# Patient Record
Sex: MALE | Race: WHITE | NOT HISPANIC OR LATINO | Employment: FULL TIME | URBAN - METROPOLITAN AREA
[De-identification: names, ages, dates, MRNs, and addresses within clinical notes are randomized per-mention and may not be internally consistent; named-entity substitution may affect disease eponyms.]

---

## 2017-02-23 DIAGNOSIS — N40.1 BENIGN NON-NODULAR PROSTATIC HYPERPLASIA WITH LOWER URINARY TRACT SYMPTOMS: ICD-10-CM

## 2017-02-23 RX ORDER — DOXAZOSIN 2 MG/1
2 TABLET ORAL AT BEDTIME
Qty: 90 TABLET | Refills: 0 | Status: SHIPPED | OUTPATIENT
Start: 2017-02-23 | End: 2017-11-24

## 2017-02-23 NOTE — TELEPHONE ENCOUNTER
Doxazosin Mesylate 2 mg tab      Last Written Prescription Date: 12-  Last Fill Quantity: 01-, #30 refills: 1    Last Office Visit with [facility], [facility] or [facility] prescribing provider:  11- Cassandra Villafana.   Future Office Visit:        BP Readings from Last 3 Encounters:   12/29/16 (!) 128/92   11/25/16 131/84   10/25/16 116/81

## 2017-02-23 NOTE — TELEPHONE ENCOUNTER
Routing refill request to provider for review/approval because:  Labs out of range:  BP  Joel Donnelly RN, BSN

## 2017-03-05 ENCOUNTER — OFFICE VISIT (OUTPATIENT)
Dept: URGENT CARE | Facility: URGENT CARE | Age: 52
End: 2017-03-05
Payer: COMMERCIAL

## 2017-03-05 VITALS
BODY MASS INDEX: 29.35 KG/M2 | DIASTOLIC BLOOD PRESSURE: 60 MMHG | TEMPERATURE: 97.8 F | HEART RATE: 71 BPM | SYSTOLIC BLOOD PRESSURE: 122 MMHG | WEIGHT: 205 LBS | OXYGEN SATURATION: 98 % | HEIGHT: 70 IN

## 2017-03-05 DIAGNOSIS — J01.90 ACUTE SINUSITIS WITH SYMPTOMS > 10 DAYS: Primary | ICD-10-CM

## 2017-03-05 PROCEDURE — 99213 OFFICE O/P EST LOW 20 MIN: CPT | Performed by: PHYSICIAN ASSISTANT

## 2017-03-05 RX ORDER — DOXYCYCLINE 100 MG/1
100 CAPSULE ORAL 2 TIMES DAILY
Qty: 28 CAPSULE | Refills: 0 | Status: SHIPPED | OUTPATIENT
Start: 2017-03-05 | End: 2017-03-19

## 2017-03-05 RX ORDER — FLUTICASONE PROPIONATE 50 MCG
2 SPRAY, SUSPENSION (ML) NASAL DAILY
Qty: 1 BOTTLE | Refills: 0 | Status: SHIPPED | OUTPATIENT
Start: 2017-03-05 | End: 2017-11-24

## 2017-03-05 NOTE — MR AVS SNAPSHOT
After Visit Summary   3/5/2017    Willard Manzano    MRN: [id number]           Patient Information     Date Of Birth          1965        Visit Information        Provider Department      3/5/2017 10:45 AM New Talbert PA-C [facility]        Today's Diagnoses     Acute sinusitis with symptoms > 10 days    -  1      Care Instructions      Sinusitis (Antibiotic Treatment)    The sinuses are air-filled spaces within the bones of the face. They connect to the inside of the nose. Sinusitis is an inflammation of the tissue lining the sinus cavity. Sinus inflammation can occur during a cold. It can also be due to allergies to pollens and other particles in the air. Sinusitis can cause symptoms of sinus congestion and fullness. A sinus infection causes fever, headache and facial pain. There is often green or yellow drainage from the nose or into the back of the throat (post-nasal drip). You have been given antibiotics to treat this condition.  Home care:    Take the full course of antibiotics as instructed. Do not stop taking them, even if you feel better.    Drink plenty of water, hot tea, and other liquids. This may help thin mucus. It also may promote sinus drainage.    Heat may help soothe painful areas of the face. Use a towel soaked in hot water. Or,  the shower and direct the hot spray onto your face. Using a vaporizer along with a menthol rub at night may also help.     An expectorant containing guaifenesin may help thin the mucus and promote drainage from the sinuses.    Over-the-counter decongestants may be used unless a similar medicine was prescribed. Nasal sprays work the fastest. Use one that contains phenylephrine or oxymetazoline. First blow the nose gently. Then use the spray. Do not use these medicines more often than directed on the label or symptoms may get worse. You may also use tablets containing pseudoephedrine. Avoid products that combine  ingredients, because side effects may be increased. Read labels. You can also ask the pharmacist for help. (NOTE: Persons with high blood pressure should not use decongestants. They can raise blood pressure.)    Over-the-counter antihistamines may help if allergies contributed to your sinusitis.      Do not use nasal rinses or irrigation during an acute sinus infection, unless told to by your health care provider. Rinsing may spread the infection to other sinuses.    Use acetaminophen or ibuprofen to control pain, unless another pain medicine was prescribed. (If you have chronic liver or kidney disease or ever had a stomach ulcer, talk with your doctor before using these medicines. Aspirin should never be used in anyone under 18 years of age who is ill with a fever. It may cause severe liver damage.)    Don't smoke. This can worsen symptoms.  Follow-up care  Follow up with your healthcare provider or our staff if you are not improving within the next week.  When to seek medical advice  Call your healthcare provider if any of these occur:    Facial pain or headache becoming more severe    Stiff neck    Unusual drowsiness or confusion    Swelling of the forehead or eyelids    Vision problems, including blurred or double vision    Fever of 100.4 F (38 C) or higher, or as directed by your healthcare provider    Seizure    Breathing problems  Symptoms not resolving within 10 days      9923-4091 [employer]. [address] [place] [address], [place] [address]. All rights reserved. This information is not intended as a substitute for professional medical care. Always follow your healthcare professional's instructions.              Follow-ups after your visit        Who to contact     If you have questions or need follow up information about today's clinic visit or your schedule please contact [facility] directly at [phone].  Normal or non-critical lab and imaging results will be communicated to you  "by [employer], letter or phone within 4 business days after the clinic has received the results. If you do not hear from us within 7 days, please contact the clinic through [employer] or phone. If you have a critical or abnormal lab result, we will notify you by phone as soon as possible.  Submit refill requests through [employer] or call your pharmacy and they will forward the refill request to us. Please allow 3 business days for your refill to be completed.          Additional Information About Your Visit        Fontself Information     [employer] gives you secure access to your electronic health record. If you see a primary care provider, you can also send messages to your care team and make appointments. If you have questions, please call your primary care clinic.  If you do not have a primary care provider, please call [phone] and they will assist you.        Care EveryWhere ID     This is your Care EveryWhere ID. This could be used by other organizations to access your [facility] medical records  [id number]        Your Vitals Were     Pulse Temperature Height Pulse Oximetry BMI (Body Mass Index)       71 97.8  F (36.6  C) (Oral) 5' 10\" (1.778 m) 98% 29.41 kg/m2        Blood Pressure from Last 3 Encounters:   03/05/17 122/60   12/29/16 (!) 128/92   11/25/16 131/84    Weight from Last 3 Encounters:   03/05/17 205 lb (93 kg)   11/25/16 203 lb 3.2 oz (92.2 kg)   10/25/16 205 lb 12.8 oz (93.4 kg)              Today, you had the following     No orders found for display         Today's Medication Changes          These changes are accurate as of: 3/5/17 12:39 PM.  If you have any questions, ask your nurse or doctor.               Start taking these medicines.        Dose/Directions    doxycycline Monohydrate 100 MG Caps   Used for:  Acute sinusitis with symptoms > 10 days   Started by:  New Talbert PA-C        Dose:  100 mg   Take 1 capsule (100 mg) by mouth 2 times daily for 14 days   Quantity:  28 " capsule   Refills:  0       fluticasone 50 MCG/ACT spray   Commonly known as:  FLONASE   Used for:  Acute sinusitis with symptoms > 10 days   Started by:  New Talbert PA-C        Dose:  2 spray   Spray 2 sprays into both nostrils daily   Quantity:  1 Bottle   Refills:  0         Stop taking these medicines if you haven't already. Please contact your care team if you have questions.     amoxicillin 875 MG tablet   Commonly known as:  AMOXIL   Stopped by:  New Talbert PA-C           dexamethasone 4 MG/ML injection   Commonly known as:  DECADRON   Stopped by:  New Talbert PA-C                Where to get your medicines      These medications were sent to [facility]/pharmacy #[id number] - [place], [place] - [address] [address].  [address] [address] [address]     Phone:  [phone]     doxycycline Monohydrate 100 MG Caps    fluticasone 50 MCG/ACT spray                Primary Care Provider Office Phone # Fax #    Cassandra Hiwot Villafana PA-C [phone]       [address] [address]        Thank you!     Thank you for choosing [facility]  for your care. Our goal is always to provide you with excellent care. Hearing back from our patients is one way we can continue to improve our services. Please take a few minutes to complete the written survey that you may receive in the mail after your visit with us. Thank you!             Your Updated Medication List - Protect others around you: Learn how to safely use, store and throw away your medicines at www.disposemymeds.org.          This list is accurate as of: 3/5/17 12:39 PM.  Always use your most recent med list.                   Brand Name Dispense Instructions for use    ALLEGRA 180 MG tablet   Generic drug:  fexofenadine     30 tablet    Take 1 tablet by mouth daily.       aspirin 81 MG chewable tablet      Take 81 mg by mouth daily       doxazosin 2  MG tablet    CARDURA    90 tablet    Take 1 tablet (2 mg) by mouth At Bedtime       doxycycline Monohydrate 100 MG Caps     28 capsule    Take 1 capsule (100 mg) by mouth 2 times daily for 14 days       fenofibrate 145 MG tablet    TRICOR    90 tablet    Take 1 tablet (145 mg) by mouth daily       FISH OIL PO          fluticasone 50 MCG/ACT spray    FLONASE    1 Bottle    Spray 2 sprays into both nostrils daily       MELATONIN PO          metFORMIN 500 MG 24 hr tablet    GLUCOPHAGE-XR    90 tablet    Take 1 tablet (500 mg) by mouth daily (with dinner)       omeprazole 40 MG capsule    priLOSEC    90 capsule    Take 1 capsule (40 mg) by mouth daily Take 30-60 minutes before a meal.

## 2017-03-05 NOTE — PROGRESS NOTES
"SUBJECTIVE:    Willard Manzano presents to  today for evaluation of nasal congestion, purulent rhinorrhea and facial/sinus pain x ~ 2 weeks duration.  Prone to sinusitis and has background hx of allergic rhinitis.  Plans to see ENT soon.     Patient denies any significant cough, F/C/N/V/D, rash, abdominal pain or any other acute illness sxs.        Past Medical History   Diagnosis Date     Chiari malformation type I (H)      Elevated fasting glucose 1/19/2012     Headaches, migraine      Headaches, migraine      Other and unspecified hyperlipidemia      Rotator cuff tear 1/22/2012     Rotator cuff tear 1/22/2012     Rotator cuff tendinitis 1/2011     Inj. Dr. Cheney.      Spinal headache 2007     spontaneous spinal fluid leak - 2 blood patches     Urethral polyp 6-24-10     Dr. Card     Urethral polyp 6/24/2010     Dr. Card        Current Outpatient Prescriptions:      doxazosin (CARDURA) 2 MG tablet, Take 1 tablet (2 mg) by mouth At Bedtime, Disp: 90 tablet, Rfl: 0     MELATONIN PO, , Disp: , Rfl:      fenofibrate (TRICOR) 145 MG tablet, Take 1 tablet (145 mg) by mouth daily, Disp: 90 tablet, Rfl: 3     omeprazole (PRILOSEC) 40 MG capsule, Take 1 capsule (40 mg) by mouth daily Take 30-60 minutes before a meal., Disp: 90 capsule, Rfl: 3     metFORMIN (GLUCOPHAGE-XR) 500 MG 24 hr tablet, Take 1 tablet (500 mg) by mouth daily (with dinner), Disp: 90 tablet, Rfl: 1     aspirin 81 MG chewable tablet, Take 81 mg by mouth daily, Disp: , Rfl:      Omega-3 Fatty Acids (FISH OIL PO), , Disp: , Rfl:      fexofenadine (ALLEGRA) 180 MG tablet, Take 1 tablet by mouth daily., Disp: 30 tablet, Rfl: 1    Allergies   Allergen Reactions     Cats      Congestion and drainage     No Known Allergies            OBJECTIVE:  /60 (BP Location: Right arm, Patient Position: Chair, Cuff Size: Adult Regular)  Pulse 71  Temp 97.8  F (36.6  C) (Oral)  Ht 5' 10\" (1.778 m)  Wt 205 lb (93 kg)  SpO2 98%  BMI 29.41 kg/m2    General " "appearance: alert and no apparent distress  Skin color is pink and without rash.  HEENT:   Conjunctiva not injected.  Sclera clear.  Left TM is normal: no effusions, no erythema, and normal landmarks.  Right TM is normal: no effusions, no erythema, and normal landmarks.  Nasal mucosa is congested and erythematous. Positive maxillary sinus tenderness.   Oropharyngeal exam is normal other than post-nasal drip : no lesions, erythema, adenopathy or exudate.  Neck is supple with no adenopathy  CARDIAC:NORMAL - regular rate and rhythm without murmur.  RESP: Normal - CTA without rales, rhonchi, or wheezing.      ASSESSMENT/PLAN:    (J01.90) Acute sinusitis with symptoms > 10 days  (primary encounter diagnosis)  Plan: doxycycline Monohydrate 100 MG CAPS,         fluticasone (FLONASE) 50 MCG/ACT spray  abx and Flonase as per above. Follow-up with PCP if sxs change, worsen or fail to fully resolve with above tx.  In addition to the above, \"red flag\" signs and sxs are reviewed with pt both verbally and by way of printed educational material for home review.  Pt verbalizes understanding of and agrees to the above plan.         "

## 2017-03-25 DIAGNOSIS — R73.01 ELEVATED FASTING GLUCOSE: ICD-10-CM

## 2017-03-27 NOTE — TELEPHONE ENCOUNTER
Pending Prescriptions:                       Disp   Refills    metFORMIN (GLUCOPHAGE-XR) 500 MG 24 hr ta*90 tab*1            Sig: TAKE 1 TABLET (500 MG) BY MOUTH DAILY (WITH           DINNER)               Last Written Prescription Date: 9/29/2016  Last Fill Quantity: 90, # refills: 1  Last Office Visit with [facility], [facility] or [facility] prescribing provider:  11/25/2016Broderick        BP Readings from Last 3 Encounters:   03/05/17 122/60   12/29/16 (!) 128/92   11/25/16 131/84     No results found for: MICROL  No results found for: MICROALBUMIN  Creatinine   Date Value Ref Range Status   08/20/2014 1.13 0.66 - 1.25 mg/dL Final   ]  GFR Estimate   Date Value Ref Range Status   08/20/2014 68 >60 mL/min/1.7m2 Final     Comment:     Non  GFR Calc   08/09/2013 65 >60 mL/min/1.7m2 Final   01/10/2013 62 >60 mL/min/1.7m2 Final     GFR Estimate If Black   Date Value Ref Range Status   08/20/2014 83 >60 mL/min/1.7m2 Final     Comment:      GFR Calc   08/09/2013 79 >60 mL/min/1.7m2 Final   01/10/2013 75 >60 mL/min/1.7m2 Final     Lab Results   Component Value Date    CHOL 191 03/25/2016     Lab Results   Component Value Date    HDL 41 03/25/2016     Lab Results   Component Value Date     03/25/2016     Lab Results   Component Value Date    TRIG 100 03/25/2016     Lab Results   Component Value Date    CHOLHDLRATIO 4.7 08/20/2014     Lab Results   Component Value Date    AST 27 08/20/2014     Lab Results   Component Value Date    ALT 42 10/25/2016     Lab Results   Component Value Date    A1C 6.0 10/25/2016    A1C 6.0 03/25/2016    A1C 6.4 12/30/2015    A1C 6.1 08/20/2014    A1C 5.9 08/01/2013     Potassium   Date Value Ref Range Status   08/20/2014 4.1 3.4 - 5.3 mmol/L Final

## 2017-03-28 RX ORDER — METFORMIN HCL 500 MG
TABLET, EXTENDED RELEASE 24 HR ORAL
Qty: 90 TABLET | Refills: 1 | Status: SHIPPED | OUTPATIENT
Start: 2017-03-28 | End: 2017-09-29

## 2017-04-04 ENCOUNTER — OFFICE VISIT (OUTPATIENT)
Dept: FAMILY MEDICINE | Facility: CLINIC | Age: 52
End: 2017-04-04
Payer: COMMERCIAL

## 2017-04-04 VITALS
DIASTOLIC BLOOD PRESSURE: 88 MMHG | TEMPERATURE: 98 F | WEIGHT: 206 LBS | BODY MASS INDEX: 29.49 KG/M2 | HEART RATE: 85 BPM | RESPIRATION RATE: 18 BRPM | HEIGHT: 70 IN | OXYGEN SATURATION: 95 % | SYSTOLIC BLOOD PRESSURE: 128 MMHG

## 2017-04-04 DIAGNOSIS — J01.01 ACUTE RECURRENT MAXILLARY SINUSITIS: Primary | ICD-10-CM

## 2017-04-04 DIAGNOSIS — J30.2 SEASONAL ALLERGIC RHINITIS, UNSPECIFIED ALLERGIC RHINITIS TRIGGER: ICD-10-CM

## 2017-04-04 PROCEDURE — 99213 OFFICE O/P EST LOW 20 MIN: CPT | Performed by: PHYSICIAN ASSISTANT

## 2017-04-04 RX ORDER — MONTELUKAST SODIUM 10 MG/1
10 TABLET ORAL AT BEDTIME
Qty: 90 TABLET | Refills: 1 | Status: SHIPPED | OUTPATIENT
Start: 2017-04-04 | End: 2017-11-24

## 2017-04-04 NOTE — PROGRESS NOTES
SUBJECTIVE:                                                    Willard Manzano is a 52 year old male who presents to clinic today for the following health issues:      Acute Illness   Acute illness concern: sinus   Onset: 6 days     Fever: no     Chills/Sweats: YES    Headache (location?): YES    Sinus Pressure:YES    Conjunctivitis:  YES: bilateral    Ear Pain: no    Rhinorrhea: YES    Congestion: YES    Sore Throat: no      Cough: YES-non-productive    Wheeze: YES    Decreased Appetite: YES    Nausea: no     Vomiting: no     Diarrhea:  no     Dysuria/Freq.: no     Fatigue/Achiness: YES    Sick/Strep Exposure: no      Therapies Tried and outcome: Nyquil     Was seen earlier this month for similar symptoms, which responded with doxy. Also was seen in December for similar symptoms yet again and responded to amoxicillin.    Of note, patient does have history of allergies. Takes allegra daily. Was given flonase earlier this month. Not currently taking this.       Problem list and histories reviewed & adjusted, as indicated.  Additional history: as documented    Patient Active Problem List   Diagnosis     Chiari malformation type I (H)     Hyperlipidemia LDL goal <160     Family history of colon cancer     Rotator cuff tendinitis     Elevated fasting glucose     Rotator cuff tear     Family history of coronary artery disease     Esophageal reflux     Benign non-nodular prostatic hyperplasia with lower urinary tract symptoms     Bone spur on posterior portion of calcaneus     Bilateral ankle pain     Past Surgical History:   Procedure Laterality Date     COLONOSCOPY  12/29/10    non-adenomatous polyp. Advised 5 yr f/u.      HC COLONOSCOPY THRU STOMA, DIAGNOSTIC  6/26/08    normal. 3 yr f/u due to brother CA age 43.     HC REMOVAL OF TONSILS,<13 Y/O       HC VASECTOMY UNILAT/BILAT W POSTOP SEMEN       SURGICAL HISTORY OF -   2/2006    umbilical hernia repair with mesh       Social History   Substance Use Topics     Smoking  status: Never Smoker     Smokeless tobacco: Never Used     Alcohol use 0.0 oz/week     0 Standard drinks or equivalent per week      Comment:  once a week     Family History   Problem Relation Age of Onset     DIABETES Mother      dx in her 40's. overwt. Now IDDM.     Hypertension Mother      Lipids Mother      CANCER Father       lung CA with brain mets age 65     Cancer - colorectal Brother      Gideon.  Dx age 45      Cardiovascular Brother      Loi. Brother MI at age 49. Had another stent at age 52     Breast Cancer Sister      Latricia. Dx bilateral breast CA at age 56.      Family History Negative Sister      Stacey.     Family History Negative Sister      Tom.      Family History Negative Sister      Mayra.      Allergies Son      Family History Negative Son      Family History Negative Daughter          Current Outpatient Prescriptions   Medication Sig Dispense Refill     amoxicillin-clavulanate (AUGMENTIN) 875-125 MG per tablet Take 1 tablet by mouth 2 times daily 20 tablet 0     montelukast (SINGULAIR) 10 MG tablet Take 1 tablet (10 mg) by mouth At Bedtime 90 tablet 1     metFORMIN (GLUCOPHAGE-XR) 500 MG 24 hr tablet TAKE 1 TABLET (500 MG) BY MOUTH DAILY (WITH DINNER) 90 tablet 1     fluticasone (FLONASE) 50 MCG/ACT spray Spray 2 sprays into both nostrils daily 1 Bottle 0     MELATONIN PO        fenofibrate (TRICOR) 145 MG tablet Take 1 tablet (145 mg) by mouth daily 90 tablet 3     omeprazole (PRILOSEC) 40 MG capsule Take 1 capsule (40 mg) by mouth daily Take 30-60 minutes before a meal. 90 capsule 3     aspirin 81 MG chewable tablet Take 81 mg by mouth daily       Omega-3 Fatty Acids (FISH OIL PO)        doxazosin (CARDURA) 2 MG tablet Take 1 tablet (2 mg) by mouth At Bedtime (Patient not taking: Reported on 2017) 90 tablet 0     fexofenadine (ALLEGRA) 180 MG tablet Take 180 mg by mouth daily Reported on 2017 30 tablet 1     Allergies   Allergen Reactions     Cats      Congestion and drainage      "No Known Allergies      BP Readings from Last 3 Encounters:   04/04/17 128/88   03/05/17 122/60   12/29/16 (!) 128/92    Wt Readings from Last 3 Encounters:   04/04/17 206 lb (93.4 kg)   03/05/17 205 lb (93 kg)   11/25/16 203 lb 3.2 oz (92.2 kg)                    Reviewed and updated as needed this visit by clinical staff  Tobacco  Allergies  Meds  Problems  Med Hx  Surg Hx  Fam Hx  Soc Hx        Reviewed and updated as needed this visit by Provider  Allergies  Meds  Problems         ROS:  Constitutional, HEENT, cardiovascular, pulmonary, gi and gu systems are negative, except as otherwise noted.    OBJECTIVE:                                                    /88 (BP Location: Right arm, Patient Position: Chair, Cuff Size: Adult Large)  Pulse 85  Temp 98  F (36.7  C) (Oral)  Resp 18  Ht 5' 9.5\" (1.765 m)  Wt 206 lb (93.4 kg)  SpO2 95%  BMI 29.98 kg/m2  Body mass index is 29.98 kg/(m^2).  GENERAL: healthy, alert and no distress  EYES: allergic shiner's bilaterally. Otherwise, Eyes grossly normal to inspection, PERRL and conjunctivae and sclerae normal  HENT: normal cephalic/atraumatic, both ears: clear effusion, nasal mucosa edematous , oropharynx clear, oral mucous membranes moist and sinuses: maxillary, frontal tenderness on bilaterally  NECK: no adenopathy, no asymmetry, masses, or scars and thyroid normal to palpation  RESP: lungs clear to auscultation - no rales, rhonchi or wheezes  CV: regular rates and rhythm, normal S1 S2, no S3 or S4 and no murmur, click or rub  PSYCH: mentation appears normal, affect normal/bright    Diagnostic Test Results:  none      ASSESSMENT/PLAN:                                                      (J01.01) Acute recurrent maxillary sinusitis  (primary encounter diagnosis)  Comment: evident on history and exam. Given severity, abx use is felt needed. However, I feel this is secondary to allergies. Recommending continued allegra and flonase and recommending " follow up with allergist. Given severity of symptoms and recurrence, will also attempt addition of singulair. If symptoms fail to improve in 5 days, patient should RTC. Sooner if worsening.   Plan: amoxicillin-clavulanate (AUGMENTIN) 875-125 MG         per tablet        -Medication use and side effects discussed with the patient. Patient is in complete understanding and agreement with plan.       (J30.2) Seasonal allergic rhinitis, unspecified allergic rhinitis trigger  Comment: as above   Plan: montelukast (SINGULAIR) 10 MG tablet,         ALLERGY/ASTHMA ADULT REFERRAL        -Medication use and side effects discussed with the patient. Patient is in complete understanding and agreement with plan.         Follow up: as above. Per allergist.     Robin Maria PA-C  [facility]

## 2017-04-04 NOTE — PATIENT INSTRUCTIONS
Nasal Allergies: Related Problems  Allergies can cause nasal passages to swell. This narrows the air passags. Allergies also cause increased mucus production in the nose. These changes result in nasal allergy symptoms. Common symptoms include itching, sneezing, stuffy nose, and runny nose. Nasal allergies can also cause problems in other parts of the respiratory system. Some of the more common problems are discussed below. If you think you have any of these problems, talk to your health care provider about treatment options.    Sinus infections  Fluid may be trapped in the sinuses. Bacteria may grow in trapped fluid. This causes sinus infection (sinusitis).  Conjunctivitis  Allergens irritate your eyes, including the lining of the conjunctiva. This causes eyes to become red, itchy, puffy, and watery.  Ear problems  The eustachian tube connects the middle ear to nasal passages.  Allergies can block this tube, and make the ears feel plugged. Fluid may also build up, leading to an ear infection (otitis media).  Nasal polyps  Allergies cause nasal passages to swell. Constant swelling can lead to formation of a sac called a polyp. Polyps can grow large enough to block nasal passages.  Asthma  Asthma is inflammation and swelling of the air passages in the lungs. The symptoms are wheezing, shortness of breath, coughing, and chest tightness. Allergies, including nasal allergies, are common in people with asthma.    [id number] [employer]. [address] [place] [address], [place] [address]. All rights reserved. This information is not intended as a substitute for professional medical care. Always follow your healthcare professional's instructions.

## 2017-04-04 NOTE — MR AVS SNAPSHOT
After Visit Summary   4/4/2017    Willard Manzano    MRN: [id number]           Patient Information     Date Of Birth          1965        Visit Information        Provider Department      4/4/2017 3:00 PM Robin Maria PA-C [facility]        Today's Diagnoses     Acute recurrent maxillary sinusitis    -  1    Seasonal allergic rhinitis, unspecified allergic rhinitis trigger          Care Instructions      Nasal Allergies: Related Problems  Allergies can cause nasal passages to swell. This narrows the air passags. Allergies also cause increased mucus production in the nose. These changes result in nasal allergy symptoms. Common symptoms include itching, sneezing, stuffy nose, and runny nose. Nasal allergies can also cause problems in other parts of the respiratory system. Some of the more common problems are discussed below. If you think you have any of these problems, talk to your health care provider about treatment options.    Sinus infections  Fluid may be trapped in the sinuses. Bacteria may grow in trapped fluid. This causes sinus infection (sinusitis).  Conjunctivitis  Allergens irritate your eyes, including the lining of the conjunctiva. This causes eyes to become red, itchy, puffy, and watery.  Ear problems  The eustachian tube connects the middle ear to nasal passages.  Allergies can block this tube, and make the ears feel plugged. Fluid may also build up, leading to an ear infection (otitis media).  Nasal polyps  Allergies cause nasal passages to swell. Constant swelling can lead to formation of a sac called a polyp. Polyps can grow large enough to block nasal passages.  Asthma  Asthma is inflammation and swelling of the air passages in the lungs. The symptoms are wheezing, shortness of breath, coughing, and chest tightness. Allergies, including nasal allergies, are common in people with asthma.    7733-2196 [employer]. [address] [place] [address],  [place] [address]. All rights reserved. This information is not intended as a substitute for professional medical care. Always follow your healthcare professional's instructions.              Follow-ups after your visit        Additional Services     ALLERGY/ASTHMA ADULT REFERRAL       Your provider has referred you to: [facility]: [facility] [address] [phone]   [email or web address]    Please be aware that coverage of these services is subject to the terms and limitations of your health insurance plan.  Call member services at your health plan with any benefit or coverage questions.      Please bring the following with you to your appointment:    (1) Any X-Rays, CTs or MRIs which have been performed.  Contact the facility where they were done to arrange for  prior to your scheduled appointment.    (2) List of current medications  (3) This referral request   (4) Any documents/labs given to you for this referral                  Who to contact     If you have questions or need follow up information about today's clinic visit or your schedule please contact [facility] directly at [phone].  Normal or non-critical lab and imaging results will be communicated to you by HomeStayhart, letter or phone within 4 business days after the clinic has received the results. If you do not hear from us within 7 days, please contact the clinic through HomeStayhart or phone. If you have a critical or abnormal lab result, we will notify you by phone as soon as possible.  Submit refill requests through BO.LT or call your pharmacy and they will forward the refill request to us. Please allow 3 business days for your refill to be completed.          Additional Information About Your Visit        HomeStayharN-Sided Information     BO.LT gives you secure access to your electronic health record. If you see a primary care provider, you can also send messages to your care team and make appointments. If you have  "questions, please call your primary care clinic.  If you do not have a primary care provider, please call [phone] and they will assist you.        Care EveryWhere ID     This is your Care EveryWhere ID. This could be used by other organizations to access your [facility] medical records  [id number]        Your Vitals Were     Pulse Temperature Respirations Height Pulse Oximetry BMI (Body Mass Index)    85 98  F (36.7  C) (Oral) 18 5' 9.5\" (1.765 m) 95% 29.98 kg/m2       Blood Pressure from Last 3 Encounters:   04/04/17 128/88   03/05/17 122/60   12/29/16 (!) 128/92    Weight from Last 3 Encounters:   04/04/17 206 lb (93.4 kg)   03/05/17 205 lb (93 kg)   11/25/16 203 lb 3.2 oz (92.2 kg)              We Performed the Following     ALLERGY/ASTHMA ADULT REFERRAL          Today's Medication Changes          These changes are accurate as of: 4/4/17  3:39 PM.  If you have any questions, ask your nurse or doctor.               Start taking these medicines.        Dose/Directions    amoxicillin-clavulanate 875-125 MG per tablet   Commonly known as:  AUGMENTIN   Used for:  Acute recurrent maxillary sinusitis   Started by:  Robin Maria PA-C        Dose:  1 tablet   Take 1 tablet by mouth 2 times daily   Quantity:  20 tablet   Refills:  0       montelukast 10 MG tablet   Commonly known as:  SINGULAIR   Used for:  Seasonal allergic rhinitis, unspecified allergic rhinitis trigger   Started by:  Robin Maria PA-C        Dose:  10 mg   Take 1 tablet (10 mg) by mouth At Bedtime   Quantity:  90 tablet   Refills:  1            Where to get your medicines      These medications were sent to [facility]/pharmacy #[id number] - [place], [place] - [address] [address]  [address] [place] [address]., [facility] [address]     Phone:  [phone]     amoxicillin-clavulanate 875-125 MG per tablet    montelukast 10 MG tablet                Primary Care Provider Office Phone # Fax #    Cassandra Hiwot Villafana PA-C [phone]       [address] " [facility] [address]  [facility] [address]        Thank you!     Thank you for choosing [facility]  for your care. Our goal is always to provide you with excellent care. Hearing back from our patients is one way we can continue to improve our services. Please take a few minutes to complete the written survey that you may receive in the mail after your visit with us. Thank you!             Your Updated Medication List - Protect others around you: Learn how to safely use, store and throw away your medicines at www.disposemymeds.org.          This list is accurate as of: 4/4/17  3:39 PM.  Always use your most recent med list.                   Brand Name Dispense Instructions for use    ALLEGRA 180 MG tablet   Generic drug:  fexofenadine     30 tablet    Take 180 mg by mouth daily Reported on 4/4/2017       amoxicillin-clavulanate 875-125 MG per tablet    AUGMENTIN    20 tablet    Take 1 tablet by mouth 2 times daily       aspirin 81 MG chewable tablet      Take 81 mg by mouth daily       doxazosin 2 MG tablet    CARDURA    90 tablet    Take 1 tablet (2 mg) by mouth At Bedtime       fenofibrate 145 MG tablet    TRICOR    90 tablet    Take 1 tablet (145 mg) by mouth daily       FISH OIL PO          fluticasone 50 MCG/ACT spray    FLONASE    1 Bottle    Spray 2 sprays into both nostrils daily       MELATONIN PO          metFORMIN 500 MG 24 hr tablet    GLUCOPHAGE-XR    90 tablet    TAKE 1 TABLET (500 MG) BY MOUTH DAILY (WITH DINNER)       montelukast 10 MG tablet    SINGULAIR    90 tablet    Take 1 tablet (10 mg) by mouth At Bedtime       omeprazole 40 MG capsule    priLOSEC    90 capsule    Take 1 capsule (40 mg) by mouth daily Take 30-60 minutes before a meal.

## 2017-04-04 NOTE — NURSING NOTE
"Chief Complaint   Patient presents with     Sinus Problem       Initial /88 (BP Location: Right arm, Patient Position: Chair, Cuff Size: Adult Large)  Pulse 85  Temp 98  F (36.7  C) (Oral)  Resp 18  Ht 5' 9.5\" (1.765 m)  Wt 206 lb (93.4 kg)  SpO2 95%  BMI 29.98 kg/m2 Estimated body mass index is 29.98 kg/(m^2) as calculated from the following:    Height as of this encounter: 5' 9.5\" (1.765 m).    Weight as of this encounter: 206 lb (93.4 kg).  Medication Reconciliation: complete   "

## 2017-04-20 ENCOUNTER — TRANSFERRED RECORDS (OUTPATIENT)
Dept: HEALTH INFORMATION MANAGEMENT | Facility: CLINIC | Age: 52
End: 2017-04-20

## 2017-04-26 ENCOUNTER — HOSPITAL ENCOUNTER (OUTPATIENT)
Dept: LAB | Facility: CLINIC | Age: 52
Discharge: HOME | End: 2017-04-26
Attending: ALLERGY & IMMUNOLOGY | Admitting: ALLERGY & IMMUNOLOGY
Payer: COMMERCIAL

## 2017-04-26 DIAGNOSIS — R09.81 NASAL CONGESTION: ICD-10-CM

## 2017-04-26 DIAGNOSIS — J31.0 RHINITIS: Primary | ICD-10-CM

## 2017-04-26 PROCEDURE — 86003 ALLG SPEC IGE CRUDE XTRC EA: CPT | Performed by: ALLERGY & IMMUNOLOGY

## 2017-04-26 PROCEDURE — 36415 COLL VENOUS BLD VENIPUNCTURE: CPT | Performed by: ALLERGY & IMMUNOLOGY

## 2017-04-30 LAB
A ALTERNATA IGE QN: NORMAL KU(A)/L
A FUMIGATUS IGE QN: NORMAL KU(A)/L
AMER ROACH IGE QN: NORMAL KU(A)/L
CAT DANDER IGG QN: NORMAL KU(A)/L
COMMON RAGWEED IGE QN: NORMAL KU(A)/L
D FARINAE IGE QN: NORMAL KU(A)/L
D PTERONYSS IGE QN: NORMAL KU(A)/L
DOG DANDER+EPITH IGE QN: NORMAL KU(A)/L
ENGL PLANTAIN IGE QN: NORMAL KU(A)/L
MAPLE IGE QN: NORMAL KU(A)/L
ROACH IGE QN: NORMAL KU(A)/L
SALTWORT IGE QN: NORMAL KU(A)/L
SILVER BIRCH IGE QN: NORMAL KU(A)/L
TIMOTHY IGE QN: NORMAL KU(A)/L
WHITE OAK IGE QN: NORMAL KU(A)/L

## 2017-09-29 DIAGNOSIS — R73.01 ELEVATED FASTING GLUCOSE: ICD-10-CM

## 2017-09-29 NOTE — TELEPHONE ENCOUNTER
METFORMIN  MG TABLET        Last Written Prescription Date: 03-  Last Fill Quantity: 06-, #90 refills: 1  Last Office Visit with G, P or [facility] prescribing provider:  04- Robin Maria        BP Readings from Last 3 Encounters:   04/04/17 128/88   03/05/17 122/60   12/29/16 (!) 128/92     No results found for: MICROL  No results found for: UMALCR  Creatinine   Date Value Ref Range Status   08/20/2014 1.13 0.66 - 1.25 mg/dL Final   ]  GFR Estimate   Date Value Ref Range Status   08/20/2014 68 >60 mL/min/1.7m2 Final     Comment:     Non  GFR Calc   08/09/2013 65 >60 mL/min/1.7m2 Final   01/10/2013 62 >60 mL/min/1.7m2 Final     GFR Estimate If Black   Date Value Ref Range Status   08/20/2014 83 >60 mL/min/1.7m2 Final     Comment:      GFR Calc   08/09/2013 79 >60 mL/min/1.7m2 Final   01/10/2013 75 >60 mL/min/1.7m2 Final     Lab Results   Component Value Date    CHOL 191 03/25/2016     Lab Results   Component Value Date    HDL 41 03/25/2016     Lab Results   Component Value Date     03/25/2016     Lab Results   Component Value Date    TRIG 100 03/25/2016     Lab Results   Component Value Date    CHOLHDLRATIO 4.7 08/20/2014     Lab Results   Component Value Date    AST 27 08/20/2014     Lab Results   Component Value Date    ALT 42 10/25/2016     Lab Results   Component Value Date    A1C 6.0 10/25/2016    A1C 6.0 03/25/2016    A1C 6.4 12/30/2015    A1C 6.1 08/20/2014    A1C 5.9 08/01/2013     Potassium   Date Value Ref Range Status   08/20/2014 4.1 3.4 - 5.3 mmol/L Final

## 2017-09-29 NOTE — TELEPHONE ENCOUNTER
Routing refill request to provider for review/approval because:  Labs not current:    Diagnosis:  Elevated fasting glucose [R73.01]     Glucose   Date Value Ref Range Status   08/20/2014 99 70 - 99 mg/dL Final     Comment:     Effective 7/30/2014, the reference range for this assay has changed to reflect   new instrumentation/methodology.     ]

## 2017-10-03 RX ORDER — METFORMIN HCL 500 MG
TABLET, EXTENDED RELEASE 24 HR ORAL
Qty: 90 TABLET | Refills: 1 | Status: SHIPPED | OUTPATIENT
Start: 2017-10-03 | End: 2018-03-29

## 2017-11-14 DIAGNOSIS — R73.01 ELEVATED FASTING GLUCOSE: Primary | ICD-10-CM

## 2017-11-14 DIAGNOSIS — E78.5 HYPERLIPIDEMIA LDL GOAL <160: ICD-10-CM

## 2017-11-24 ENCOUNTER — OFFICE VISIT (OUTPATIENT)
Dept: FAMILY MEDICINE | Facility: CLINIC | Age: 52
End: 2017-11-24
Payer: COMMERCIAL

## 2017-11-24 DIAGNOSIS — R73.01 ELEVATED FASTING GLUCOSE: ICD-10-CM

## 2017-11-24 DIAGNOSIS — Z23 NEED FOR PROPHYLACTIC VACCINATION AND INOCULATION AGAINST INFLUENZA: ICD-10-CM

## 2017-11-24 DIAGNOSIS — E78.5 HYPERLIPIDEMIA LDL GOAL <130: Primary | ICD-10-CM

## 2017-11-24 DIAGNOSIS — M25.511 ACUTE PAIN OF RIGHT SHOULDER: ICD-10-CM

## 2017-11-24 DIAGNOSIS — E78.5 HYPERLIPIDEMIA LDL GOAL <160: ICD-10-CM

## 2017-11-24 LAB
ALBUMIN SERPL-MCNC: 3.9 G/DL (ref 3.4–5)
ALP SERPL-CCNC: 32 U/L (ref 40–150)
ALT SERPL W P-5'-P-CCNC: 48 U/L (ref 0–70)
ANION GAP SERPL CALCULATED.3IONS-SCNC: 8 MMOL/L (ref 3–14)
AST SERPL W P-5'-P-CCNC: 27 U/L (ref 0–45)
BILIRUB SERPL-MCNC: 0.5 MG/DL (ref 0.2–1.3)
BUN SERPL-MCNC: 18 MG/DL (ref 7–30)
CALCIUM SERPL-MCNC: 9.1 MG/DL (ref 8.5–10.1)
CHLORIDE SERPL-SCNC: 106 MMOL/L (ref 94–109)
CHOLEST SERPL-MCNC: 213 MG/DL
CO2 SERPL-SCNC: 26 MMOL/L (ref 20–32)
CREAT SERPL-MCNC: 1.12 MG/DL (ref 0.66–1.25)
GFR SERPL CREATININE-BSD FRML MDRD: 68 ML/MIN/1.7M2
GLUCOSE SERPL-MCNC: 111 MG/DL (ref 70–99)
HBA1C MFR BLD: 6 % (ref 4.3–6)
HDLC SERPL-MCNC: 52 MG/DL
LDLC SERPL CALC-MCNC: 133 MG/DL
NONHDLC SERPL-MCNC: 161 MG/DL
POTASSIUM SERPL-SCNC: 4.3 MMOL/L (ref 3.4–5.3)
PROT SERPL-MCNC: 7.2 G/DL (ref 6.8–8.8)
SODIUM SERPL-SCNC: 140 MMOL/L (ref 133–144)
TRIGL SERPL-MCNC: 138 MG/DL

## 2017-11-24 PROCEDURE — 90686 IIV4 VACC NO PRSV 0.5 ML IM: CPT | Performed by: PHYSICIAN ASSISTANT

## 2017-11-24 PROCEDURE — 80053 COMPREHEN METABOLIC PANEL: CPT | Performed by: PHYSICIAN ASSISTANT

## 2017-11-24 PROCEDURE — 83036 HEMOGLOBIN GLYCOSYLATED A1C: CPT | Performed by: PHYSICIAN ASSISTANT

## 2017-11-24 PROCEDURE — 99214 OFFICE O/P EST MOD 30 MIN: CPT | Mod: 25 | Performed by: PHYSICIAN ASSISTANT

## 2017-11-24 PROCEDURE — 90471 IMMUNIZATION ADMIN: CPT | Performed by: PHYSICIAN ASSISTANT

## 2017-11-24 PROCEDURE — 36415 COLL VENOUS BLD VENIPUNCTURE: CPT | Performed by: PHYSICIAN ASSISTANT

## 2017-11-24 PROCEDURE — 80061 LIPID PANEL: CPT | Performed by: PHYSICIAN ASSISTANT

## 2017-11-24 RX ORDER — FENOFIBRATE 145 MG/1
TABLET, COATED ORAL
Qty: 90 TABLET | Refills: 1 | Status: SHIPPED | OUTPATIENT
Start: 2017-11-24 | End: 2018-08-26

## 2017-11-24 NOTE — PROGRESS NOTES
SUBJECTIVE:   Willard Manzano is a 52 year old male who presents to clinic today for the following health issues:      Elevated fasting glucose/metformin Follow-up      Patient is checking blood sugars: not at all    Diabetic concerns: None     Symptoms of hypoglycemia (low blood sugar): none     Paresthesias (numbness or burning in feet) or sores: Yes heels-but has plantar fasciitis     Date of last diabetic eye exam: this year    Hyperlipidemia Follow-Up      Rate your low fat/cholesterol diet?: not monitoring fat    Taking statin?  No    Other lipid medications/supplements?:  Fenofibrate, without side effects      Joint Pain    Onset: several months    Description:   Location: right shoulder  Character: Sharp    Intensity: moderate    Progression of Symptoms: intermittent    Accompanying Signs & Symptoms:  Other symptoms: none    History:   Previous similar pain: no       Precipitating factors:   Trauma or overuse: no     Alleviating factors:  Improved by: nothing    Therapies Tried and outcome: none           Amount of exercise or physical activity: 2-3 days/week for an average of greater than 60 minutes    Problems taking medications regularly: No    Medication side effects: none  Diet: regular (no restrictions)          Problem list and histories reviewed & adjusted, as indicated.  Additional history: as documented    Patient Active Problem List   Diagnosis     Chiari malformation type I (H)     Hyperlipidemia LDL goal <160     Family history of colon cancer     Rotator cuff tendinitis     Elevated fasting glucose     Rotator cuff tear     Family history of coronary artery disease     Esophageal reflux     Benign non-nodular prostatic hyperplasia with lower urinary tract symptoms     Bone spur on posterior portion of calcaneus     Bilateral ankle pain     Past Surgical History:   Procedure Laterality Date     COLONOSCOPY  12/29/10    non-adenomatous polyp. Advised 5 yr f/u.      HC COLONOSCOPY THRU STOMA,  DIAGNOSTIC  08    normal. 3 yr f/u due to brother CA age 43.     HC REMOVAL OF TONSILS,<11 Y/O       HC VASECTOMY UNILAT/BILAT W POSTOP SEMEN       SURGICAL HISTORY OF -   2006    umbilical hernia repair with mesh       Social History   Substance Use Topics     Smoking status: Never Smoker     Smokeless tobacco: Never Used     Alcohol use 0.0 oz/week     0 Standard drinks or equivalent per week      Comment:  once a week     Family History   Problem Relation Age of Onset     DIABETES Mother      dx in her 40's. overwt. Now IDDM.     Hypertension Mother      Lipids Mother      CANCER Father       lung CA with brain mets age 65     Cancer - colorectal Brother      Gideon.  Dx age 45      Cardiovascular Brother      Loi. Brother MI at age 49. Had another stent at age 52     Breast Cancer Sister      Latricia. Dx bilateral breast CA at age 56.      Family History Negative Sister      Stacey.     Family History Negative Sister      Tom.      Family History Negative Sister      Mayra.      Allergies Son      Family History Negative Son      Family History Negative Daughter              [facility] and [facility]  [facility]       Reviewed and updated as needed this visit by Provider         ROS:  Constitutional, HEENT, cardiovascular, pulmonary, gi and gu systems are negative, except as otherwise noted.      OBJECTIVE:   There were no vitals taken for this visit.  GENERAL APPEARANCE: healthy, alert and no distress  RESP: lungs clear to auscultation - no rales, rhonchi or wheezes  CV: regular rates and rhythm, normal S1 S2, no S3 or S4 and no murmur, click or rub  ORTHO:   SHOULDER Exam-Right   Inspection: no swelling, no bruising, no discoloration, no obvious deformity, no asymmetry, no glenohumeral joint anterior bulge, no distal clavicle elevation, no muscle atrophy, no scapular winging   Tenderness of: SC joint- no, clavicle(prox-mid)- no, clavicle-(mid-distal)- no, AC joint-  no, acromion- no, anterior capsule- no, prox bicep tendon- YES, greater tuberosity- no, prox humerus- no, supraspinatous- no, infraspinatous- no, superior trapezious- no, rhomboids- no   Range of Motion: Active- forward flexion- normal, abduction- normal, external rotation- normal, internal rotation- painful  Range of Motion: Passive- forward flexion- normal, abduction- normal, external rotation- normal, internal rotation- normal   Strength: forward flexion- 5/5, abduction- 5/5, internal rotation- 5/5, external rotation- 5/5 and bicep- full   Special tests: cross arm adduction- POSITIVE            SKIN: no suspicious lesions or rashes        ASSESSMENT/PLAN:             1. Hyperlipidemia LDL goal <130  Stable. Await labs.   - fenofibrate 145 MG tablet; TAKE 1 TABLET (145 MG) BY MOUTH DAILY  Dispense: 90 tablet; Refill: 1    2. Acute pain of right shoulder  Trial of physical therapy.  Call if no improvement and can refer.   - [facility] REFERR[facility]    3. Elevated fasting glucose  Stable A1c at 6.0, continue metformin    4. Need for prophylactic vaccination and inoculation against influenza    - Vaccine Administration, Initial [09311]  - FLU VAC, SPLIT VIRUS IM > 3 YO (QUADRIVALENT) [84040]        Cassandra Villafana PA-C  [facility]    Injectable Influenza Immunization Documentation    1.  Is the person to be vaccinated sick today?   No    2. Does the person to be vaccinated have an allergy to a component   of the vaccine?   No  Egg Allergy Algorithm Link    3. Has the person to be vaccinated ever had a serious reaction   to influenza vaccine in the past?   No    4. Has the person to be vaccinated ever had Guillain-Barré syndrome?   No    Form completed by Shell Lyn MA      Per orders of Dr. Villafana, injection of flu given by Shell Lyn MA. Patient instructed to remain in clinic for 15 minutes afterwards, and to report any adverse reaction to me  immediately.

## 2017-11-24 NOTE — MR AVS SNAPSHOT
After Visit Summary   11/24/2017    Willard Manzano    MRN: [id number]           Patient Information     Date Of Birth          1965        Visit Information        Provider Department      11/24/2017 10:15 AM Cassandra Villafana PA-C [facility]        Today's Diagnoses     Need for prophylactic vaccination and inoculation against influenza           Follow-ups after your visit        Your next 10 appointments already scheduled     Nov 24, 2017 10:15 AM CST   SHORT with Cassandra Villafana PA-C   [facility] ([facility]    [address] [place] [id number] [phone]              Who to contact     If you have questions or need follow up information about today's clinic visit or your schedule please contact [facility] directly at [phone].  Normal or non-critical lab and imaging results will be communicated to you by MyChart, letter or phone within 4 business days after the clinic has received the results. If you do not hear from us within 7 days, please contact the clinic through MyChart or phone. If you have a critical or abnormal lab result, we will notify you by phone as soon as possible.  Submit refill requests through Q.ME or call your pharmacy and they will forward the refill request to us. Please allow 3 business days for your refill to be completed.          Additional Information About Your Visit        MyChart Information     Q.ME gives you secure access to your electronic health record. If you see a primary care provider, you can also send messages to your care team and make appointments. If you have questions, please call your primary care clinic.  If you do not have a primary care provider, please call [phone] and they will assist you.        Care EveryWhere ID     This is your Care EveryWhere ID. This could be used by other organizations to access your [facility]  records  [id number]         Blood Pressure from Last 3 Encounters:   04/04/17 128/88   03/05/17 122/60   12/29/16 (!) 128/92    Weight from Last 3 Encounters:   04/04/17 206 lb (93.4 kg)   03/05/17 205 lb (93 kg)   11/25/16 203 lb 3.2 oz (92.2 kg)              Today, you had the following     No orders found for display         Today's Medication Changes          These changes are accurate as of: 11/24/17 10:12 AM.  If you have any questions, ask your nurse or doctor.               Stop taking these medicines if you haven't already. Please contact your care team if you have questions.     ALLEGRA 180 MG tablet   Generic drug:  fexofenadine   Stopped by:  Cassandra Villafana PA-C           amoxicillin-clavulanate 875-125 MG per tablet   Commonly known as:  AUGMENTIN   Stopped by:  Cassandra Villafana PA-C           doxazosin 2 MG tablet   Commonly known as:  CARDURA   Stopped by:  Cassandra Villafana PA-C           fluticasone 50 MCG/ACT spray   Commonly known as:  FLONASE   Stopped by:  Cassandra Villafana PA-C           MELATONIN PO   Stopped by:  Cassandra Villafana PA-C           montelukast 10 MG tablet   Commonly known as:  SINGULAIR   Stopped by:  Cassandra Villafana PA-C                    Primary Care Provider Office Phone # Fax #    Cassandra Villafana PA-C [phone] [address] [facility] [address]        Equal Access to Services     [facility] AH: Hadii aad ku hadasho Soomaali, waaxda luqadaha, qaybta kaalmada adeegyada, waxay idiin haydee sanchez . So [facility] [phone].    ATENCIÓN: Si habla luis, tiene a gao disposición servicios gratuitos de asistencia lingüística. Llame al [phone].    We comply with applicable federal civil rights laws and [place] laws. We do not discriminate on the basis of race, color, national origin, age, disability, sex, sexual orientation, or gender identity.            Thank you!     Thank you for choosing [facility]  for your care. Our goal is always to provide you with excellent care. Hearing back from our patients is one way we can continue to improve our services. Please take a few minutes to complete the written survey that you may receive in the mail after your visit with us. Thank you!             Your Updated Medication List - Protect others around you: Learn how to safely use, store and throw away your medicines at www.disposemymeds.org.          This list is accurate as of: 11/24/17 10:12 AM.  Always use your most recent med list.                   Brand Name Dispense Instructions for use Diagnosis    aspirin 81 MG chewable tablet      Take 81 mg by mouth daily    Chest pain, SOB (shortness of breath), Decreased exercise tolerance, Hyperlipidemia LDL goal <130, Family history of coronary artery disease, Elevated fasting glucose       fenofibrate 145 MG tablet     30 tablet    TAKE 1 TABLET (145 MG) BY MOUTH DAILY    Hyperlipidemia LDL goal <130       FISH OIL PO       Chest pain, SOB (shortness of breath), Decreased exercise tolerance, Hyperlipidemia LDL goal <130, Family history of coronary artery disease, Elevated fasting glucose       metFORMIN 500 MG 24 hr tablet    GLUCOPHAGE-XR    90 tablet    TAKE 1 TABLET (500 MG) BY MOUTH DAILY (WITH DINNER)    Elevated fasting glucose       omeprazole 40 MG capsule    priLOSEC    90 capsule    TAKE 1 CAPSULE (40 MG) BY MOUTH DAILY TAKE 30-60 MINUTES BEFORE A MEAL.    Gastroesophageal reflux disease, esophagitis presence not specified

## 2017-12-07 ENCOUNTER — THERAPY VISIT (OUTPATIENT)
Dept: PHYSICAL THERAPY | Facility: CLINIC | Age: 52
End: 2017-12-07
Payer: COMMERCIAL

## 2017-12-07 DIAGNOSIS — M25.511 CHRONIC RIGHT SHOULDER PAIN: Primary | ICD-10-CM

## 2017-12-07 DIAGNOSIS — G89.29 CHRONIC RIGHT SHOULDER PAIN: Primary | ICD-10-CM

## 2017-12-07 PROCEDURE — 97161 PT EVAL LOW COMPLEX 20 MIN: CPT | Mod: GP | Performed by: PHYSICAL THERAPIST

## 2017-12-07 PROCEDURE — 97110 THERAPEUTIC EXERCISES: CPT | Mod: GP | Performed by: PHYSICAL THERAPIST

## 2017-12-07 NOTE — MR AVS SNAPSHOT
After Visit Summary   12/7/2017    Willard Manzano    MRN: [id number]           Patient Information     Date Of Birth          1965        Visit Information        Provider Department      12/7/2017 4:30 PM Kwadwo Gomes PT [facility] Physical Therapy        Today's Diagnoses     Chronic right shoulder pain    -  1       Follow-ups after your visit        Your next 10 appointments already scheduled     Jan 11, 2018  4:30 PM CST   YOKO Extremity with Kwadwo Gomes PT   [facility] Physical Therapy ([facility])    [address]  [facility] [id number] [phone]              Who to contact     If you have questions or need follow up information about today's clinic visit or your schedule please contact [facility] PH[facility] THERAP[facility] directly at [phone].  Normal or non-critical lab and imaging results will be communicated to you by Magiqhart, letter or phone within 4 business days after the clinic has received the results. If you do not hear from us within 7 days, please contact the clinic through Magiqhart or phone. If you have a critical or abnormal lab result, we will notify you by phone as soon as possible.  Submit refill requests through About[employer] or call your pharmacy and they will forward the refill request to us. Please allow 3 business days for your refill to be completed.          Additional Information About Your Visit        MyChart Information     About[employer] gives you secure access to your electronic health record. If you see a primary care provider, you can also send messages to your care team and make appointments. If you have questions, please call your primary care clinic.  If you do not have a primary care provider, please call [phone] and they will assist you.        Care EveryWhere ID     This is your Care EveryWhere ID. This could be used by other  organizations to access your [place] medical records  [id number]         Blood Pressure from Last 3 Encounters:   04/04/17 128/88   03/05/17 122/60   12/29/16 (!) 128/92    Weight from Last 3 Encounters:   04/04/17 93.4 kg (206 lb)   03/05/17 93 kg (205 lb)   11/25/16 92.2 kg (203 lb 3.2 oz)              We Performed the Following     HC PT EVAL, LOW COMPLEXITY     YOKO INITIAL EVAL REPORT     THERAPEUTIC EXERCISES        Primary Care Provider Office Phone # Fax #    Cassandra Villafana PA-C [phone]       [address] [facility] [address]        Equal Access to Services     VICTOR HUGO DIAZ : Hadii aad ku hadasho S[employer], waaxda luqadaha, qaybta kaalmada adeegyada, larissa sanchez . So [facility] [phone].    ATENCIÓN: Si habla español, tiene a gao disposición servicios gratuitos de asistencia lingüística. Llame al [phone].    We comply with applicable federal civil rights laws and [place] laws. We do not discriminate on the basis of race, color, national origin, age, disability, sex, sexual orientation, or gender identity.            Thank you!     Thank you for choosing [facility] PHYSICAL THERAP[facility]  for your care. Our goal is always to provide you with excellent care. Hearing back from our patients is one way we can continue to improve our services. Please take a few minutes to complete the written survey that you may receive in the mail after your visit with us. Thank you!             Your Updated Medication List - Protect others around you: Learn how to safely use, store and throw away your medicines at www.disposemymeds.org.          This list is accurate as of: 12/7/17  4:50 PM.  Always use your most recent med list.                   Brand Name Dispense Instructions for use Diagnosis    aspirin 81 MG chewable tablet      Take 81 mg by mouth daily    Chest pain, SOB (shortness of breath), Decreased exercise tolerance,  Hyperlipidemia LDL goal <130, Family history of coronary artery disease, Elevated fasting glucose       fenofibrate 145 MG tablet     90 tablet    TAKE 1 TABLET (145 MG) BY MOUTH DAILY    Hyperlipidemia LDL goal <130       FISH OIL PO       Chest pain, SOB (shortness of breath), Decreased exercise tolerance, Hyperlipidemia LDL goal <130, Family history of coronary artery disease, Elevated fasting glucose       metFORMIN 500 MG 24 hr tablet    GLUCOPHAGE-XR    90 tablet    TAKE 1 TABLET (500 MG) BY MOUTH DAILY (WITH DINNER)    Elevated fasting glucose       omeprazole 40 MG capsule    priLOSEC    90 capsule    TAKE 1 CAPSULE (40 MG) BY MOUTH DAILY TAKE 30-60 MINUTES BEFORE A MEAL.    Gastroesophageal reflux disease, esophagitis presence not specified

## 2017-12-07 NOTE — PROGRESS NOTES
Subjective:    Patient is a 52 year old male presenting with rehab left shoulder hpi. The history is provided by the patient. No  was used.   Willard Manzano is a 52 year old male with a right shoulder condition.  Occurance: attempting to start small airplane.  Condition occurred: during recreation/sport.  This is a chronic condition  PT reports having right shoulder pain that has been present for 2 month since attempting to start model plane.  MD appt on 11/29/17..    Patient reports pain:  Upper arm.  Radiates to:  Upper arm and shoulder.  Pain is described as aching and is intermittent and reported as 6/10.  Associated symptoms:  Loss of strength. Pain is worse during the day.  Symptoms are exacerbated by using arm overhead, using arm behind back and using arm at shoulder level and relieved by rest.  Since onset symptoms are unchanged.        General health as reported by patient is good.  Pertinent medical history includes:  None.  Medical allergies: no.  Other surgeries include:  None reported.  Current medications:  None as reported by the patient.  Current occupation is [employer].   .  Patient is working in normal job without restrictions.  Primary job tasks include:  Prolonged sitting and prolonged standing.    Barriers include:  None as reported by the patient.    Red flags:  None as reported by the patient.                        Objective:    Standing Alignment:      Shoulder/UE:  Rounded shoulders                  Flexibility/Screens:   Positive screens:  Shoulder                             Shoulder Evaluation:  ROM:  AROM:  normal                            PROM:  normal                                Strength:    Flexion: Left:4/5    Pain: -    Right: 4/5      Pain:  -    Abduction:  Left: 4/5   Pain:-    Right:/5     Pain:-/+    Internal Rotation:  Left:4/5      Pain:-    Right: 4/5      Pain:-  External Rotation:   Left:4/5      Pain:-   Right:3+/5      Pain:+             Stability Testing:  normal      Special Tests:      Left shoulder negative for the following special tests:  Labral; Impingement; Bursal and Acromioclavicular    Right shoulder negative for the following special tests:Labral; Impingement; Bursal and AcrimioclavicularRotator cuff tear right shoulder: inconclusive.  Palpation:  normal      Mobility Tests:  normal                                                 General     ROS    Assessment/Plan:      Patient is a 52 year old male with right side shoulder complaints.    Patient has the following significant findings with corresponding treatment plan.                Diagnosis 1:  R shoulder pain  Pain -  self management, education, directional preference exercise and home program  Decreased strength - therapeutic exercise and therapeutic activities  Impaired muscle performance - neuro re-education  Decreased function - therapeutic activities    Therapy Evaluation Codes:   1) History comprised of:   Personal factors that impact the plan of care:      None.    Comorbidity factors that impact the plan of care are:      Cancer.     Medications impacting care: None.  2) Examination of Body Systems comprised of:   Body structures and functions that impact the plan of care:      Shoulder.   Activity limitations that impact the plan of care are:      Bathing and Dressing.  3) Clinical presentation characteristics are:   Stable/Uncomplicated.  4) Decision-Making    Low complexity using standardized patient assessment instrument and/or measureable assessment of functional outcome.  Cumulative Therapy Evaluation is: Low complexity.    Previous and current functional limitations:  (See Goal Flow Sheet for this information)    Short term and Long term goals: (See Goal Flow Sheet for this information)     Communication ability:  Patient appears to be able to clearly communicate and understand verbal and written communication and follow directions correctly.  Treatment  Explanation - The following has been discussed with the patient:   RX ordered/plan of care  Anticipated outcomes  Possible risks and side effects  This patient would benefit from PT intervention to resume normal activities.   Rehab potential is good.    Frequency:  1 X week, once daily  Duration:  for 6 weeks  Discharge Plan:  Achieve all LTG.  Independent in home treatment program.  Reach maximal therapeutic benefit.    Please refer to the daily flowsheet for treatment today, total treatment time and time spent performing 1:1 timed codes.

## 2018-01-02 ENCOUNTER — OFFICE VISIT (OUTPATIENT)
Dept: FAMILY MEDICINE | Facility: CLINIC | Age: 53
End: 2018-01-02
Payer: COMMERCIAL

## 2018-01-02 VITALS
WEIGHT: 209.8 LBS | SYSTOLIC BLOOD PRESSURE: 129 MMHG | HEIGHT: 70 IN | TEMPERATURE: 98.2 F | BODY MASS INDEX: 30.03 KG/M2 | OXYGEN SATURATION: 98 % | DIASTOLIC BLOOD PRESSURE: 86 MMHG | HEART RATE: 71 BPM

## 2018-01-02 DIAGNOSIS — G89.29 CHRONIC RIGHT SHOULDER PAIN: Primary | ICD-10-CM

## 2018-01-02 DIAGNOSIS — M25.511 CHRONIC RIGHT SHOULDER PAIN: Primary | ICD-10-CM

## 2018-01-02 DIAGNOSIS — R10.9 FLANK PAIN: ICD-10-CM

## 2018-01-02 LAB
ALBUMIN UR-MCNC: NEGATIVE MG/DL
APPEARANCE UR: CLEAR
BILIRUB UR QL STRIP: NEGATIVE
COLOR UR AUTO: YELLOW
GLUCOSE UR STRIP-MCNC: NEGATIVE MG/DL
HGB UR QL STRIP: NEGATIVE
KETONES UR STRIP-MCNC: NEGATIVE MG/DL
LEUKOCYTE ESTERASE UR QL STRIP: NEGATIVE
NITRATE UR QL: NEGATIVE
PH UR STRIP: 7 PH (ref 5–7)
SOURCE: NORMAL
SP GR UR STRIP: 1.02 (ref 1–1.03)
UROBILINOGEN UR STRIP-ACNC: 0.2 EU/DL (ref 0.2–1)

## 2018-01-02 PROCEDURE — 81003 URINALYSIS AUTO W/O SCOPE: CPT | Performed by: PHYSICIAN ASSISTANT

## 2018-01-02 PROCEDURE — 99214 OFFICE O/P EST MOD 30 MIN: CPT | Performed by: PHYSICIAN ASSISTANT

## 2018-01-02 RX ORDER — TAMSULOSIN HYDROCHLORIDE 0.4 MG/1
0.4 CAPSULE ORAL DAILY
Qty: 14 CAPSULE | Refills: 0 | Status: SHIPPED | OUTPATIENT
Start: 2018-01-02 | End: 2018-06-14

## 2018-01-02 RX ORDER — CIPROFLOXACIN 500 MG/1
500 TABLET, FILM COATED ORAL 2 TIMES DAILY
Qty: 28 TABLET | Refills: 0 | Status: SHIPPED | OUTPATIENT
Start: 2018-01-02 | End: 2018-06-14

## 2018-01-02 NOTE — PROGRESS NOTES
SUBJECTIVE:   Willard Manzano is a 52 year old male who presents to clinic today for the following health issues:      Joint Pain    Onset: over 6 months ago    Description:   Location: right shoulder  Character: Sharp, Dull ache, Stabbing, Gnawing, Burning and Cramping    Intensity: moderate    Progression of Symptoms: worse    Accompanying Signs & Symptoms:  Other symptoms: weakness of right arm    History:   Previous similar pain: YES- years ago      Precipitating factors:   Trauma or overuse: YES    Alleviating factors:  Improved by: nothing    Therapies Tried and outcome: PT with no relief   Years ago had seen Dr. Erick Cheney for similar pain.            Genitourinary - Male  Onset: about a week ago    Description:   Dysuria (painful urination): YES  Hematuria (blood in urine): no   Frequency: no   Are you urinating at night : no   Hesitancy (delay in urine): YES  Retention (unable to empty): YES  Decrease in urinary flow: YES  Incontinence: no     Progression of Symptoms:  same    Accompanying Signs & Symptoms:  Fever: no   Back/Flank pain: YES- back  Urethral discharge: no   Testicle lumps/masses/pain: no   Nausea and/or vomiting: no   Abdominal pain: no     History:   History of frequent UTI's: YES  History of kidney stones: YES  History of hernias: YES  Personal or Family history of Prostate problems: no  Sexually active: YES    Precipitating factors:       Alleviating factors:  Nothing       Problem list and histories reviewed & adjusted, as indicated.  Additional history: as documented    Patient Active Problem List   Diagnosis     Chiari malformation type I (H)     Hyperlipidemia LDL goal <160     Family history of colon cancer     Rotator cuff tendinitis     Elevated fasting glucose     Rotator cuff tear     Family history of coronary artery disease     Esophageal reflux     Benign non-nodular prostatic hyperplasia with lower urinary tract symptoms     Past Surgical History:   Procedure Laterality Date      "COLONOSCOPY  12/29/10    non-adenomatous polyp. Advised 5 yr f/u.      HC COLONOSCOPY THRU STOMA, DIAGNOSTIC  08    normal. 3 yr f/u due to brother CA age 43.     HC REMOVAL OF TONSILS,<11 Y/O       HC VASECTOMY UNILAT/BILAT W POSTOP SEMEN       SURGICAL HISTORY OF -   2006    umbilical hernia repair with mesh       Social History   Substance Use Topics     Smoking status: Never Smoker     Smokeless tobacco: Never Used     Alcohol use 0.0 oz/week     0 Standard drinks or equivalent per week      Comment:  once a week     Family History   Problem Relation Age of Onset     DIABETES Mother      dx in her 40's. overwt. Now IDDM.     Hypertension Mother      Lipids Mother      CANCER Father       lung CA with brain mets age 65     Cancer - colorectal Brother      Gideon.  Dx age 45      Cardiovascular Brother      Loi. Brother MI at age 49. Had another stent at age 52     Breast Cancer Sister      Latricia. Dx bilateral breast CA at age 56.      Family History Negative Sister      Stacey.     Family History Negative Sister      Tom.      Family History Negative Sister      Mayra.      Allergies Son      Family History Negative Son      Family History Negative Daughter              Reviewed and updated as needed this visit by clinical staffTobacco  Allergies  Meds  Med Hx  Surg Hx  Fam Hx  Soc Hx      Reviewed and updated as needed this visit by Provider         ROS:  Constitutional, HEENT, cardiovascular, pulmonary, gi and gu systems are negative, except as otherwise noted.      OBJECTIVE:   /86 (BP Location: Right arm, Patient Position: Chair, Cuff Size: Adult Large)  Pulse 71  Temp 98.2  F (36.8  C) (Oral)  Ht 5' 9.5\" (1.765 m)  Wt 209 lb 12.8 oz (95.2 kg)  SpO2 98%  BMI 30.54 kg/m2  Body mass index is 30.54 kg/(m^2).  GENERAL APPEARANCE: healthy, alert and no distress  RESP: lungs clear to auscultation - no rales, rhonchi or wheezes  CV: regular rates and rhythm, normal S1 S2, no S3 or S4 and no " murmur, click or rub  ABDOMEN: soft, nontender, without hepatosplenomegaly or masses and bowel sounds normal  MS: LROM of RUE secondary to pain    Results for orders placed or performed in visit on 01/02/18   *UA reflex to Microscopic and Culture ([facility] (except [place] and [place])   Result Value Ref Range    Color Urine Yellow     Appearance Urine Clear     Glucose Urine Negative NEG^Negative mg/dL    Bilirubin Urine Negative NEG^Negative    Ketones Urine Negative NEG^Negative mg/dL    Specific Gravity Urine 1.020 1.003 - 1.035    Blood Urine Negative NEG^Negative    pH Urine 7.0 5.0 - 7.0 pH    Protein Albumin Urine Negative NEG^Negative mg/dL    Urobilinogen Urine 0.2 0.2 - 1.0 EU/dL    Nitrite Urine Negative NEG^Negative    Leukocyte Esterase Urine Negative NEG^Negative    Source Midstream Urine          ASSESSMENT/PLAN:             1. Chronic right shoulder pain  Referral placed, pt was previously established with Dr. Erick [facility] at [facility] REFERR[facility]    2. Flank pain  Offered imaging, pt preferred treatment given history of stones.  Cannot fully r/o prostatitis.  If any change in symptoms call or return to clinic The patient indicates understanding of these issues and agrees with the plan.    - *UA reflex to Microscopic and Culture ([facility] (except [place] and [place])  - tamsulosin (FLOMAX) 0.4 MG capsule; Take 1 capsule (0.4 mg) by mouth daily  Dispense: 14 capsule; Refill: 0  - ciprofloxacin (CIPRO) 500 MG tablet; Take 1 tablet (500 mg) by mouth 2 times daily  Dispense: 28 tablet; Refill: 0        Cassandra Villafana PA-C  [facility]

## 2018-01-02 NOTE — MR AVS SNAPSHOT
After Visit Summary   1/2/2018    Willard Manzano    MRN: [id number]           Patient Information     Date Of Birth          1965        Visit Information        Provider Department      1/2/2018 4:00 PM Cassandra Villafana PA-C [facility]         Follow-ups after your visit        Your next 10 appointments already scheduled     Jan 02, 2018  4:00 PM CST   Marilyhart Short with Cassandra Villafana PA-C   [facility] ([facility])    [address] [place] [address] [phone]            Jan 11, 2018  4:30 PM CST   YOK[facility] Extremity with Kwadwo Gomes, PT   [place] [facility] ([facility] [place])    [address] [place] [address] [phone]              Who to contact     If you have questions or need follow up information about today's clinic visit or your schedule please contact [facility] directly at [phone].  Normal or non-critical lab and imaging results will be communicated to you by MyChart, letter or phone within 4 business days after the clinic has received the results. If you do not hear from us within 7 days, please contact the clinic through [employer] Sales[employer] or phone. If you have a critical or abnormal lab result, we will notify you by phone as soon as possible.  Submit refill requests through [employer] or call your pharmacy and they will forward the refill request to us. Please allow 3 business days for your refill to be completed.          Additional Information About Your Visit        MyChart Information     [employer] gives you secure access to your electronic health record. If you see a primary care provider, you can also send messages to your care team and make appointments. If you have questions, please call your primary care clinic.  If you do not have a primary care provider, please call [phone] and they will assist you.       "  Care EveryWhere ID     This is your Care EveryWhere ID. This could be used by other organizations to access your [place] medical records  [id number]        Your Vitals Were     Pulse Temperature Height Pulse Oximetry BMI (Body Mass Index)       71 98.2  F (36.8  C) (Oral) 5' 9.5\" (1.765 m) 98% 30.54 kg/m2        Blood Pressure from Last 3 Encounters:   01/02/18 129/86   04/04/17 128/88   03/05/17 122/60    Weight from Last 3 Encounters:   01/02/18 209 lb 12.8 oz (95.2 kg)   04/04/17 206 lb (93.4 kg)   03/05/17 205 lb (93 kg)              Today, you had the following     No orders found for display       Primary Care Provider Office Phone # Fax #    Cassandra Villafana PA-C [phone] [address] [facility] [address]        Equal Access to Services     VICTOR HUGO DIAZ : Hadii aad ku hadasho Soomaali, waaxda luqadaha, qaybta kaalmada adeegyada, waxay torresin hayjean pierren rod sanchez . So [facility] [phone].    ATENCIÓN: Si ravinderla espzoey, tiene a gao disposición servicios gratuitos de asistencia lingüística. Llame al [phone].    We comply with applicable federal civil rights laws and [place] laws. We do not discriminate on the basis of race, color, national origin, age, disability, sex, sexual orientation, or gender identity.            Thank you!     Thank you for choosing [facility]  for your care. Our goal is always to provide you with excellent care. Hearing back from our patients is one way we can continue to improve our services. Please take a few minutes to complete the written survey that you may receive in the mail after your visit with us. Thank you!             Your Updated Medication List - Protect others around you: Learn how to safely use, store and throw away your medicines at www.disposemymeds.org.          This list is accurate as of: 1/2/18  3:55 PM.  Always use your most recent med list.                   Brand Name Dispense Instructions for use " Diagnosis    aspirin 81 MG chewable tablet      Take 81 mg by mouth daily    Chest pain, SOB (shortness of breath), Decreased exercise tolerance, Hyperlipidemia LDL goal <130, Family history of coronary artery disease, Elevated fasting glucose       fenofibrate 145 MG tablet     90 tablet    TAKE 1 TABLET (145 MG) BY MOUTH DAILY    Hyperlipidemia LDL goal <130       FISH OIL PO       Chest pain, SOB (shortness of breath), Decreased exercise tolerance, Hyperlipidemia LDL goal <130, Family history of coronary artery disease, Elevated fasting glucose       metFORMIN 500 MG 24 hr tablet    GLUCOPHAGE-XR    90 tablet    TAKE 1 TABLET (500 MG) BY MOUTH DAILY (WITH DINNER)    Elevated fasting glucose       omeprazole 40 MG capsule    priLOSEC    90 capsule    TAKE 1 CAPSULE (40 MG) BY MOUTH DAILY TAKE 30-60 MINUTES BEFORE A MEAL.    Gastroesophageal reflux disease, esophagitis presence not specified

## 2018-01-18 ENCOUNTER — THERAPY VISIT (OUTPATIENT)
Dept: PHYSICAL THERAPY | Facility: CLINIC | Age: 53
End: 2018-01-18
Payer: COMMERCIAL

## 2018-01-18 DIAGNOSIS — M25.511 CHRONIC RIGHT SHOULDER PAIN: Primary | ICD-10-CM

## 2018-01-18 DIAGNOSIS — G89.29 CHRONIC RIGHT SHOULDER PAIN: Primary | ICD-10-CM

## 2018-01-18 PROCEDURE — 97110 THERAPEUTIC EXERCISES: CPT | Mod: GP | Performed by: PHYSICAL THERAPIST

## 2018-03-29 DIAGNOSIS — R73.01 ELEVATED FASTING GLUCOSE: ICD-10-CM

## 2018-03-29 NOTE — TELEPHONE ENCOUNTER
"Requested Prescriptions   Pending Prescriptions Disp Refills     metFORMIN (GLUCOPHAGE-XR) 500 MG 24 hr tablet [Pharmacy Med Name: METFORMIN  MG TABLET] 90 tablet 1    Last Written Prescription Date:  10/3/17  Last Fill Quantity: 90,  # refills: 1   Last Office Visit: 1/2/2018   Future Office Visit:      Sig: TAKE 1 TABLET (500 MG) BY MOUTH DAILY (WITH DINNER)    Biguanide Agents Failed    3/29/2018  2:09 AM       Failed - Patient has had a Microalbumin in the past 12 mos.    No lab results found.         Passed - Blood pressure less than 140/90 in past 6 months    BP Readings from Last 3 Encounters:   01/02/18 129/86   04/04/17 128/88   03/05/17 122/60                Passed - Patient has documented LDL within the past 12 mos.    Recent Labs   Lab Test  11/24/17   0810   LDL  133*            Passed - Patient is age 10 or older       Passed - Patient has documented A1c within the specified period of time.    Recent Labs   Lab Test  11/24/17   0810   A1C  6.0            Passed - Patient's CR is NOT>1.4 OR Patient's EGFR is NOT<45 within past 12 mos.    Recent Labs   Lab Test  11/24/17   0810   GFRESTIMATED  68   GFRESTBLACK  82       Recent Labs   Lab Test  11/24/17   0810   CR  1.12            Passed - Patient does NOT have a diagnosis of CHF.       Passed - Recent (6 mo) or future (30 days) visit within the authorizing provider's specialty    Patient had office visit in the last 6 months or has a visit in the next 30 days with authorizing provider or within the authorizing provider's specialty.  See \"Patient Info\" tab in inbasket, or \"Choose Columns\" in Meds & Orders section of the refill encounter.              "

## 2018-03-30 RX ORDER — METFORMIN HCL 500 MG
TABLET, EXTENDED RELEASE 24 HR ORAL
Qty: 90 TABLET | Refills: 1 | Status: SHIPPED | OUTPATIENT
Start: 2018-03-30 | End: 2018-12-26

## 2018-03-30 NOTE — TELEPHONE ENCOUNTER
Chart reviewed. Dx elevated fasting glucose.   Lab Results   Component Value Date    A1C 6.0 11/24/2017   Your electrolytes,A1c , kidney function and liver enzymes looked good.   Prescription approved per [facility] Refill Protocol.  Mathieu Johnson RN

## 2018-04-03 ENCOUNTER — THERAPY VISIT (OUTPATIENT)
Dept: PHYSICAL THERAPY | Facility: CLINIC | Age: 53
End: 2018-04-03
Payer: COMMERCIAL

## 2018-04-03 DIAGNOSIS — G89.29 CHRONIC RIGHT SHOULDER PAIN: ICD-10-CM

## 2018-04-03 DIAGNOSIS — M25.511 CHRONIC RIGHT SHOULDER PAIN: ICD-10-CM

## 2018-04-03 PROCEDURE — 97110 THERAPEUTIC EXERCISES: CPT | Mod: GP | Performed by: PHYSICAL THERAPIST

## 2018-04-03 NOTE — PROGRESS NOTES
"Discharge Note    Progress reporting period is from initial eval to Apr 3, 2018.     Please see information below for last relevant information on current status.  Patient seen for Rxs Used: 3 visits.  SUBJECTIVE  Subjective changes noted by patient:  Subjective: Pt reports continued \"impingement\" in right shoulder.  Functioning at 65% of where he wants to be.  .  Current pain level is Current Pain level: 3/10.     Previous pain level was  Initial Pain level: 6/10.   Changes in function:  Yes (See Goal flowsheet attached for changes in current functional level)  Adverse reaction to treatment or activity: None    OBJECTIVE  Changes noted in objective findings: Objective: AROM WNL.  + impingement screening on right.  R strength 3+/5 generally     ASSESSMENT/PLAN  Diagnosis: R shoulder pain    DIAGP:  The encounter diagnosis was Chronic right shoulder pain.  Updated problem list and treatment plan:   Decreased function - HEP  Decreased strength - HEP  STG/LTGs have been met or progress has been made towards goals:  Yes, please see goal flowsheet for most current information  Assessment of Progress: current status is unknown.  Last current status:     Self Management Plans:  HEP  I have re-evaluated this patient and find that the nature, scope, duration and intensity of the therapy is appropriate for the medical condition of the patient.  Willard continues to require the following intervention to meet STG and LTG's:  HEP.    Recommendations:  Discharge with current home program.  Patient to follow up with MD as needed.    Please refer to the daily flowsheet for treatment today, total treatment time and time spent performing 1:1 timed codes.  "

## 2018-04-03 NOTE — MR AVS SNAPSHOT
After Visit Summary   4/3/2018    Willard Manzano    MRN: [id number]           Patient Information     Date Of Birth          1965        Visit Information        Provider Department      4/3/2018 3:00 PM Kwadwo Gomes PT [facility]        Today's Diagnoses     Chronic right shoulder pain           Follow-ups after your visit        Who to contact     If you have questions or need follow up information about today's clinic visit or your schedule please contact [facility] PH[facility]ICAL THERAPY directly at [phone].  Normal or non-critical lab and imaging results will be communicated to you by [employer], letter or phone within 4 business days after the clinic has received the results. If you do not hear from us within 7 days, please contact the clinic through HyperQuestt or phone. If you have a critical or abnormal lab result, we will notify you by phone as soon as possible.  Submit refill requests through Exergyn or call your pharmacy and they will forward the refill request to us. Please allow 3 business days for your refill to be completed.          Additional Information About Your Visit        MyChart Information     Exerg[employer] gives you secure access to your electronic health record. If you see a primary care provider, you can also send messages to your care team and make appointments. If you have questions, please call your primary care clinic.  If you do not have a primary care provider, please call [phone] and they will assist you.        Care EveryWhere ID     This is your Care EveryWhere ID. This could be used by other organizations to access your [place] medical records  [id number]         Blood Pressure from Last 3 Encounters:   01/02/18 129/86   04/04/17 128/88   03/05/17 122/60    Weight from Last 3 Encounters:   01/02/18 95.2 kg (209 lb 12.8 oz)   04/04/17 93.4 kg (206 lb)   03/05/17 93 kg (205 lb)               We Performed the Following     THERAPEUTIC EXERCISES        Primary Care Provider Office Phone # Fax #    Cassandra Villafana PA-C [phone] [address] [facility] [address]        Equal Access to Services     VICTOR HUGO DIAZ : Siri gold bal felao Sorex, waaxda luqadaha, qaybta kaalmada adeegyada, larissa ferguson laYusefdee burton. So [facility] [phone].    ATENCIÓN: Si habla español, tiene a gao disposición servicios gratuitos de asistencia lingüística. Llame al [phone].    We comply with applicable federal civil rights laws and [place] laws. We do not discriminate on the basis of race, color, national origin, age, disability, sex, sexual orientation, or gender identity.            Thank you!     Thank you for choosing [facility] PH[facility] THER[facility]  for your care. Our goal is always to provide you with excellent care. Hearing back from our patients is one way we can continue to improve our services. Please take a few minutes to complete the written survey that you may receive in the mail after your visit with us. Thank you!             Your Updated Medication List - Protect others around you: Learn how to safely use, store and throw away your medicines at www.disposemymeds.org.          This list is accurate as of 4/3/18  3:34 PM.  Always use your most recent med list.                   Brand Name Dispense Instructions for use Diagnosis    aspirin 81 MG chewable tablet      Take 81 mg by mouth daily    Chest pain, SOB (shortness of breath), Decreased exercise tolerance, Hyperlipidemia LDL goal <130, Family history of coronary artery disease, Elevated fasting glucose       ciprofloxacin 500 MG tablet    CIPRO    28 tablet    Take 1 tablet (500 mg) by mouth 2 times daily    Flank pain       fenofibrate 145 MG tablet     90 tablet    TAKE 1 TABLET (145 MG) BY MOUTH DAILY    Hyperlipidemia LDL goal <130       FISH OIL PO       Chest  pain, SOB (shortness of breath), Decreased exercise tolerance, Hyperlipidemia LDL goal <130, Family history of coronary artery disease, Elevated fasting glucose       metFORMIN 500 MG 24 hr tablet    GLUCOPHAGE-XR    90 tablet    TAKE 1 TABLET (500 MG) BY MOUTH DAILY (WITH DINNER)    Elevated fasting glucose       omeprazole 40 MG capsule    priLOSEC    90 capsule    TAKE 1 CAPSULE (40 MG) BY MOUTH DAILY TAKE 30-60 MINUTES BEFORE A MEAL.    Gastroesophageal reflux disease, esophagitis presence not specified       tamsulosin 0.4 MG capsule    FLOMAX    14 capsule    Take 1 capsule (0.4 mg) by mouth daily    Flank pain

## 2018-06-11 ENCOUNTER — TRANSFERRED RECORDS (OUTPATIENT)
Dept: HEALTH INFORMATION MANAGEMENT | Facility: CLINIC | Age: 53
End: 2018-06-11

## 2018-06-12 ENCOUNTER — THERAPY VISIT (OUTPATIENT)
Dept: PHYSICAL THERAPY | Facility: CLINIC | Age: 53
End: 2018-06-12
Payer: COMMERCIAL

## 2018-06-12 DIAGNOSIS — G89.29 CHRONIC RIGHT SHOULDER PAIN: Primary | ICD-10-CM

## 2018-06-12 DIAGNOSIS — M25.511 CHRONIC RIGHT SHOULDER PAIN: Primary | ICD-10-CM

## 2018-06-12 PROCEDURE — 97110 THERAPEUTIC EXERCISES: CPT | Mod: GP | Performed by: PHYSICAL THERAPIST

## 2018-06-12 NOTE — MR AVS SNAPSHOT
After Visit Summary   6/12/2018    Willard Manzano    MRN: [id number]           Patient Information     Date Of Birth          1965        Visit Information        Provider Department      6/12/2018 3:00 PM Kwadwo Gomes, PT [place] [facility]        Today's Diagnoses     Chronic right shoulder pain    -  1       Follow-ups after your visit        Your next 10 appointments already scheduled     Jun 14, 2018  3:30 PM CDT   Pre-Op physical with Shantel Oconnor MD   [facility]    [address] [address]   [phone]            Jun 26, 2018  3:00 PM CDT   (Arrive by 2:45 PM)   YOKO Extremity with Kwadwo Gomes PT   [place] [facility] ([facility])    [address]  [facility] [id number] [phone]            Jun 28, 2018  3:40 PM CDT   YOKO Extremity with Kwadwo Gomes PT   [place] [facility] ([facility])    [address]  [facility] [address] [phone]            Jul 02, 2018  3:00 PM CDT   OYKO Extremity with Kwadwo Gomes PT   [place] [facility] ([facility])    [address] [facility] [address]  [facility] [id number] [phone]            Jul 05, 2018  3:00 PM CDT   YOKO Extremity with Kwadwo Gomes PT   [place] [facility] ([facility])    [address]  [facility] [address] [phone]            Jul 09, 2018  3:00 PM CDT   YOKO Extremity with Kwadwo Gomes PT   [place] [facility] ([facility])    [address] [facility] [address]  [facility] [id number] [phone]              Who to contact     If you have questions or need follow up information  about today's clinic visit or your schedule please contact [facility] directly at [phone].  Normal or non-critical lab and imaging results will be communicated to you by ReTenanthart, letter or phone within 4 business days after the clinic has received the results. If you do not hear from us within 7 days, please contact the clinic through ReTenanthart or phone. If you have a critical or abnormal lab result, we will notify you by phone as soon as possible.  Submit refill requests through [employer]Muse or call your pharmacy and they will forward the refill request to us. Please allow 3 business days for your refill to be completed.          Additional Information About Your Visit        TravelMuse Information     TravelMuse gives you secure access to your electronic health record. If you see a primary care provider, you can also send messages to your care team and make appointments. If you have questions, please call your primary care clinic.  If you do not have a primary care provider, please call [phone] and they will assist you.        Care EveryWhere ID     This is your Care EveryWhere ID. This could be used by other organizations to access your [place] medical records  [id number]         Blood Pressure from Last 3 Encounters:   01/02/18 129/86   04/04/17 128/88   03/05/17 122/60    Weight from Last 3 Encounters:   01/02/18 95.2 kg (209 lb 12.8 oz)   04/04/17 93.4 kg (206 lb)   03/05/17 93 kg (205 lb)              We Performed the Following     THERAPEUTIC EXERCISES        Primary Care Provider Office Phone # Fax #    Cassandra Villafana PA-C [phone]       [address] [address]        Equal Access to Services     VICTOR HUGO DIAZ : handy Quesada, larissa nelson. So [facility] [phone].    ATENCIÓN: Si habla español, tiene a gao disposición servicios gratuitos  de asistencia lingüística. Mireya ellsworth [phone].    We comply with applicable federal civil rights laws and [place] laws. We do not discriminate on the basis of race, color, national origin, age, disability, sex, sexual orientation, or gender identity.            Thank you!     Thank you for choosing [facility]  for your care. Our goal is always to provide you with excellent care. Hearing back from our patients is one way we can continue to improve our services. Please take a few minutes to complete the written survey that you may receive in the mail after your visit with us. Thank you!             Your Updated Medication List - Protect others around you: Learn how to safely use, store and throw away your medicines at www.disposemymeds.org.          This list is accurate as of 6/12/18  3:25 PM.  Always use your most recent med list.                   Brand Name Dispense Instructions for use Diagnosis    aspirin 81 MG chewable tablet      Take 81 mg by mouth daily    Chest pain, SOB (shortness of breath), Decreased exercise tolerance, Hyperlipidemia LDL goal <130, Family history of coronary artery disease, Elevated fasting glucose       ciprofloxacin 500 MG tablet    CIPRO    28 tablet    Take 1 tablet (500 mg) by mouth 2 times daily    Flank pain       fenofibrate 145 MG tablet     90 tablet    TAKE 1 TABLET (145 MG) BY MOUTH DAILY    Hyperlipidemia LDL goal <130       FISH OIL PO       Chest pain, SOB (shortness of breath), Decreased exercise tolerance, Hyperlipidemia LDL goal <130, Family history of coronary artery disease, Elevated fasting glucose       metFORMIN 500 MG 24 hr tablet    GLUCOPHAGE-XR    90 tablet    TAKE 1 TABLET (500 MG) BY MOUTH DAILY (WITH DINNER)    Elevated fasting glucose       omeprazole 40 MG capsule    priLOSEC    90 capsule    TAKE 1 CAPSULE (40 MG) BY MOUTH DAILY TAKE 30-60 MINUTES BEFORE A MEAL.    Gastroesophageal reflux disease,  esophagitis presence not specified       tamsulosin 0.4 MG capsule    FLOMAX    14 capsule    Take 1 capsule (0.4 mg) by mouth daily    Flank pain

## 2018-06-12 NOTE — PROGRESS NOTES
"Subjective:  HPI                    Objective:  System    Physical Exam    General     ROS    Assessment/Plan:    DISCHARGE REPORT    Progress reporting period is from eval to 6/12/18.       SUBJECTIVE  Subjective changes noted by patient:  .  Subjective: PT returns to therapy after having appt with surgeon.  Planning on having surgery to assess RC and \"spur\" removal    Current pain level is  Current Pain level: 4/10.     Previous pain level was   Initial Pain level: 6/10.   Changes in function:  None  Adverse reaction to treatment or activity: None    OBJECTIVE  Changes noted in objective findings:  None  Objective: AROM WNL. R ER strength 4+/5     ASSESSMENT/PLAN  Updated problem list and treatment plan: Diagnosis 1:  R shoulder pain  Pain -  self management, education, directional preference exercise and home program  Decreased ROM/flexibility - manual therapy and therapeutic exercise  Decreased strength - therapeutic exercise and therapeutic activities  Impaired muscle performance - neuro re-education  Decreased function - therapeutic activities  STG/LTGs have been met or progress has been made towards goals:  Yes (See Goal flow sheet completed today.)  Assessment of Progress: The patient's condition has potential to improve.  The patient's progress has plateaued.  Self Management Plans:  Patient has been instructed in a home treatment program.  Patient is independent in a home treatment program.  I have re-evaluated this patient and find that the nature, scope, duration and intensity of the therapy is appropriate for the medical condition of the patient.       Recommendations:  This patient is ready to be discharged from therapy and continue their home treatment program.    Please refer to the daily flowsheet for treatment today, total treatment time and time spent performing 1:1 timed codes.            "

## 2018-06-14 ENCOUNTER — OFFICE VISIT (OUTPATIENT)
Dept: FAMILY MEDICINE | Facility: CLINIC | Age: 53
End: 2018-06-14
Payer: COMMERCIAL

## 2018-06-14 VITALS
WEIGHT: 207 LBS | HEART RATE: 62 BPM | BODY MASS INDEX: 30.13 KG/M2 | SYSTOLIC BLOOD PRESSURE: 110 MMHG | DIASTOLIC BLOOD PRESSURE: 80 MMHG | RESPIRATION RATE: 12 BRPM | TEMPERATURE: 97.9 F

## 2018-06-14 DIAGNOSIS — Z01.818 PREOP GENERAL PHYSICAL EXAM: Primary | ICD-10-CM

## 2018-06-14 LAB
BASOPHILS # BLD AUTO: 0 10E9/L (ref 0–0.2)
BASOPHILS NFR BLD AUTO: 0.4 %
DIFFERENTIAL METHOD BLD: NORMAL
EOSINOPHIL # BLD AUTO: 0.1 10E9/L (ref 0–0.7)
EOSINOPHIL NFR BLD AUTO: 1.7 %
ERYTHROCYTE [DISTWIDTH] IN BLOOD BY AUTOMATED COUNT: 13.4 % (ref 10–15)
HBA1C MFR BLD: 6.1 % (ref 0–5.6)
HCT VFR BLD AUTO: 46.7 % (ref 40–53)
HGB BLD-MCNC: 16 G/DL (ref 13.3–17.7)
LYMPHOCYTES # BLD AUTO: 1.7 10E9/L (ref 0.8–5.3)
LYMPHOCYTES NFR BLD AUTO: 31.1 %
MCH RBC QN AUTO: 30.1 PG (ref 26.5–33)
MCHC RBC AUTO-ENTMCNC: 34.3 G/DL (ref 31.5–36.5)
MCV RBC AUTO: 88 FL (ref 78–100)
MONOCYTES # BLD AUTO: 0.6 10E9/L (ref 0–1.3)
MONOCYTES NFR BLD AUTO: 11.4 %
NEUTROPHILS # BLD AUTO: 3 10E9/L (ref 1.6–8.3)
NEUTROPHILS NFR BLD AUTO: 55.4 %
PLATELET # BLD AUTO: 320 10E9/L (ref 150–450)
RBC # BLD AUTO: 5.32 10E12/L (ref 4.4–5.9)
WBC # BLD AUTO: 5.3 10E9/L (ref 4–11)

## 2018-06-14 PROCEDURE — 80053 COMPREHEN METABOLIC PANEL: CPT | Performed by: FAMILY MEDICINE

## 2018-06-14 PROCEDURE — 85610 PROTHROMBIN TIME: CPT | Performed by: FAMILY MEDICINE

## 2018-06-14 PROCEDURE — 36415 COLL VENOUS BLD VENIPUNCTURE: CPT | Performed by: FAMILY MEDICINE

## 2018-06-14 PROCEDURE — 83036 HEMOGLOBIN GLYCOSYLATED A1C: CPT | Performed by: FAMILY MEDICINE

## 2018-06-14 PROCEDURE — 99214 OFFICE O/P EST MOD 30 MIN: CPT | Performed by: FAMILY MEDICINE

## 2018-06-14 PROCEDURE — 85025 COMPLETE CBC W/AUTO DIFF WBC: CPT | Performed by: FAMILY MEDICINE

## 2018-06-14 NOTE — PROGRESS NOTES
[facility]  [address] [address]  [phone]  Dept: [phone]    PRE-OP EVALUATION:  Today's date: 2018    Willard Manzano (: 1965) presents for pre-operative evaluation assessment as requested by Dr. Cedeño.  He requires evaluation and anesthesia risk assessment prior to undergoing surgery/procedure for treatment of right shoulder .    Fax number for surgical facility: [facility]  # [phone]  Primary Physician: Cassandra Villafana  Type of Anesthesia Anticipated: to be determined    Patient has a Health Care Directive or Living Will:  NO    Preop Questions 2018   Who is doing your surgery? Dr Everett Cedeño   What are you having done? Right shoulder surgery    Date of Surgery/Procedure:    1.  Do you have a history of Heart attack, stroke, stent, coronary bypass surgery, or other heart surgery? No   2.  Do you ever have any pain or discomfort in your chest? No   3.  Do you have a history of  Heart Failure? No   4.   Are you troubled by shortness of breath when:  walking on a level surface, or up a slight hill, or at night? No   5.  Do you currently have a cold, bronchitis or other respiratory infection? No   6.  Do you have a cough, shortness of breath, or wheezing? No   7.  Do you sometimes get pains in the calves of your legs when you walk? No   8. Do you or anyone in your family have previous history of blood clots? No   9.  Do you or does anyone in your family have a serious bleeding problem such as prolonged bleeding following surgeries or cuts? No   10. Have you ever had problems with anemia or been told to take iron pills? No   11. Have you had any abnormal blood loss such as black, tarry or bloody stools? No   12. Have you ever had a blood transfusion? No   13. Have you or any of your relatives ever had problems with anesthesia? No   14. Do you have sleep apnea, excessive snoring or daytime drowsiness? No   15. Do  you have any prosthetic heart valves? No   16. Do you have prosthetic joints? No         HPI:     HPI related to upcoming procedure: Chronic right shoulder pain       See problem list for active medical problems.  Problems all longstanding and stable, except as noted/documented.  See ROS for pertinent symptoms related to these conditions.                                                                                                                                                          .    MEDICAL HISTORY:     Patient Active Problem List    Diagnosis Date Noted     Benign non-nodular prostatic hyperplasia with lower urinary tract symptoms 12/30/2015     Priority: Medium     Esophageal reflux 08/20/2014     Priority: Medium     Family history of coronary artery disease 08/15/2013     Priority: Medium     Rotator cuff tear 01/22/2012     Priority: Medium     Elevated fasting glucose 01/19/2012     Priority: Medium     Rotator cuff tendinitis 01/01/2011     Priority: Medium     Inj. Dr. Cheney.        Family history of colon cancer 11/23/2010     Priority: Medium     Hyperlipidemia LDL goal <160 10/31/2010     Priority: Medium     Chiari malformation type I (H)      Priority: Medium      Past Medical History:   Diagnosis Date     Chiari malformation type I (H)      Elevated fasting glucose 1/19/2012     Headaches, migraine      Headaches, migraine      Other and unspecified hyperlipidemia      Rotator cuff tear 1/22/2012     Rotator cuff tear 1/22/2012     Rotator cuff tendinitis 1/2011    Inj. Dr. Cheney.      Spinal headache 2007    spontaneous spinal fluid leak - 2 blood patches     Urethral polyp 6-24-10    Dr. Card     Urethral polyp 6/24/2010    Dr. Card      Past Surgical History:   Procedure Laterality Date     COLONOSCOPY  12/29/10    non-adenomatous polyp. Advised 5 yr f/u.      HC COLONOSCOPY THRU STOMA, DIAGNOSTIC  6/26/08    normal. 3 yr f/u due to brother CA age 43.     HC REMOVAL OF TONSILS,<12  Y/O       HC VASECTOMY UNILAT/BILAT W POSTOP SEMEN       SURGICAL HISTORY OF -   2/2006    umbilical hernia repair with mesh     Current Outpatient Prescriptions   Medication Sig Dispense Refill     aspirin 81 MG chewable tablet Take 81 mg by mouth daily       fenofibrate 145 MG tablet TAKE 1 TABLET (145 MG) BY MOUTH DAILY 90 tablet 1     metFORMIN (GLUCOPHAGE-XR) 500 MG 24 hr tablet TAKE 1 TABLET (500 MG) BY MOUTH DAILY (WITH DINNER) 90 tablet 1     Omega-3 Fatty Acids (FISH OIL PO)        omeprazole (PRILOSEC) 40 MG capsule TAKE 1 CAPSULE (40 MG) BY MOUTH DAILY TAKE 30-60 MINUTES BEFORE A MEAL. 90 capsule 1     OTC products: Aspirin    Allergies   Allergen Reactions     Cats      Congestion and drainage     No Known Allergies       Latex Allergy: NO    Social History   Substance Use Topics     Smoking status: Never Smoker     Smokeless tobacco: Never Used     Alcohol use 0.0 oz/week     0 Standard drinks or equivalent per week      Comment:  once a week     History   Drug Use No       REVIEW OF SYSTEMS:   Constitutional, neuro, ENT, endocrine, pulmonary, cardiac, gastrointestinal, genitourinary, musculoskeletal, integument and psychiatric systems are negative, except as otherwise noted.    EXAM:   /80 (BP Location: Right arm, Patient Position: Chair, Cuff Size: Adult Regular)  Pulse 62  Temp 97.9  F (36.6  C) (Oral)  Resp 12  Wt 207 lb (93.9 kg)  BMI 30.13 kg/m2    GENERAL APPEARANCE: healthy, alert and no distress     EYES: EOMI,  PERRL     HENT: ear canals and TM's normal and nose and mouth without ulcers or lesions     NECK: no adenopathy, no asymmetry, masses, or scars and thyroid normal to palpation     RESP: lungs clear to auscultation - no rales, rhonchi or wheezes     CV: regular rates and rhythm, normal S1 S2      ABDOMEN:  soft, nontender, no HSM or masses and bowel sounds normal     MS: no edema      SKIN: no suspicious lesions or rashes     NEURO: Normal strength and tone, sensory exam  grossly normal, mentation intact and speech normal     PSYCH: mentation appears normal. and affect normal/bright     LYMPHATICS: No cervical adenopathy    DIAGNOSTICS:   EKG: Not indicated due to non-vascular surgery and low risk of event (age <65 and without cardiac risk factors)    Recent Labs   Lab Test  11/24/17   0810  10/25/16   0751   12/30/15   0815  08/20/14   1333   HGB   --   16.0   --   16.3  16.0   PLT   --   280   --   317  362   NA  140   --    --    --   138   POTASSIUM  4.3   --    --    --   4.1   CR  1.12   --    --    --   1.13   A1C  6.0  6.0   < >  6.4*  6.1*    < > = values in this interval not displayed.        IMPRESSION:   Reason for surgery/procedure: right shoulder scope, subacromial decompression, distal clavicle resection, eval biceps and rotator cuff   Diagnosis/reason for consult: Pre-operative consult     The proposed surgical procedure is considered INTERMEDIATE risk.    REVISED CARDIAC RISK INDEX  The patient has the following serious cardiovascular risks for perioperative complications such as (MI, PE, VFib and 3  AV Block):  No serious cardiac risks  INTERPRETATION: 0 risks: Class I (very low risk - 0.4% complication rate)    The patient has the following additional risks for perioperative complications:  No identified additional risks      ICD-10-CM    1. Preop general physical exam Z01.818 EKG 12-lead complete w/read - Clinics     CBC with platelets differential     Comprehensive metabolic panel     INR     Hemoglobin A1c     CANCELED: INR       RECOMMENDATIONS:       APPROVAL GIVEN to proceed with proposed procedure, without further diagnostic evaluation       Signed Electronically by: Shantel Oconnor MD    Copy of this evaluation report is provided to requesting physician.    [facility] Preop Guidelines    Revised Cardiac Risk Index

## 2018-06-14 NOTE — MR AVS SNAPSHOT
After Visit Summary   6/14/2018    Willard Manzano    MRN: [id number]           Patient Information     Date Of Birth          1965        Visit Information        Provider Department      6/14/2018 3:30 PM Shantel Oconnor MD [facility]        Today's Diagnoses     Preop general physical exam    -  1      Care Instructions      Before Your Surgery      Call your surgeon if there is any change in your health. This includes signs of a cold or flu (such as a sore throat, runny nose, cough, rash or fever).    Do not smoke, drink alcohol or take over the counter medicine (unless your surgeon or primary care doctor tells you to) for the 24 hours before and after surgery.    If you take prescribed drugs: Follow your doctor s orders about which medicines to take and which to stop until after surgery.    Eating and drinking prior to surgery: follow the instructions from your surgeon    Take a shower or bath the night before surgery. Use the soap your surgeon gave you to gently clean your skin. If you do not have soap from your surgeon, use your regular soap. Do not shave or scrub the surgery site.  Wear clean pajamas and have clean sheets on your bed.           Follow-ups after your visit        Your next 10 appointments already scheduled     Jun 26, 2018  3:00 PM CDT   (Arrive by 2:45 PM)   YOKO Extremity with Kwadwo Gomes PT   [facility] Physical Therapy ([facility])    [address] [address]   [phone]            Jun 28, 2018  3:40 PM CDT   YOKO Extremity with Kwadwo Gomes PT   [facility] ([facility])    [address] [address]   [phone]            Jul 02, 2018  3:00 PM CDT   YOKO Extremity with Kwadwo Gomes PT   [facility] Physical Therapy ([facility])    [address]  [facility] [address]   [phone]            Jul 05, 2018  3:00 PM CDT   YOKO Extremity with Kwadwo Gomes PT   [facility] Physical Therapy ([facility])    [address]  [facility] [address]   [phone]            Jul 09, 2018  3:00 PM CDT   YOKO Extremity with Kwadwo Gomes PT   [facility] Physical Therapy ([facility])    [address] [facility] [address]  [facility] [address]   [phone]              Who to contact     If you have questions or need follow up information about today's clinic visit or your schedule please contact [facility] directly at [phone].  Normal or non-critical lab and imaging results will be communicated to you by MyChart, letter or phone within 4 business days after the clinic has received the results. If you do not hear from us within 7 days, please contact the clinic through [employer] or phone. If you have a critical or abnormal lab result, we will notify you by phone as soon as possible.  Submit refill requests through [facility] or call your pharmacy and they will forward the refill request to us. Please allow 3 business days for your refill to be completed.          Additional Information About Your Visit        [employer] Information     [facility] gives you secure access to your electronic health record. If you see a primary care provider, you can also send messages to your care team and make appointments. If you have questions, please call your primary care clinic.  If you do not have a primary care provider, please call [phone] and they will assist you.        Care EveryWhere ID     This is your Care EveryWhere ID. This could be used by other organizations to access your [facility] medical records  [id number]        Your Vitals Were     Pulse Temperature Respirations BMI (Body Mass Index)          62 97.9  F (36.6  C) (Oral) 12  30.13 kg/m2         Blood Pressure from Last 3 Encounters:   06/14/18 110/80   01/02/18 129/86   04/04/17 128/88    Weight from Last 3 Encounters:   06/14/18 207 lb (93.9 kg)   01/02/18 209 lb 12.8 oz (95.2 kg)   04/04/17 206 lb (93.4 kg)              We Performed the Following     CBC with platelets differential     Comprehensive metabolic panel     EKG 12-lead complete w/read - Clinics     Hemoglobin A1c     INR        Primary Care Provider Office Phone # Fax #    Cassandar Villafana PA-C [phone] [address] [facility] [address]        Equal Access to Services     VICTOR HUGO DIAZ : Hadii gold chahalo Sorex, waaxda luzoeyadaha, qaybta kaalmada adearvind, larissa burton. So [facility] [phone].    ATENCIÓN: Si habla español, tiene a gao disposición servicios gratuitos de asistencia lingüística. Llame al [phone].    We comply with applicable federal civil rights laws and [place] laws. We do not discriminate on the basis of race, color, national origin, age, disability, sex, sexual orientation, or gender identity.            Thank you!     Thank you for choosing [facility]  for your care. Our goal is always to provide you with excellent care. Hearing back from our patients is one way we can continue to improve our services. Please take a few minutes to complete the written survey that you may receive in the mail after your visit with us. Thank you!             Your Updated Medication List - Protect others around you: Learn how to safely use, store and throw away your medicines at www.disposemymeds.org.          This list is accurate as of 6/14/18  3:59 PM.  Always use your most recent med list.                   Brand Name Dispense Instructions for use Diagnosis    aspirin 81 MG chewable tablet      Take 81 mg by mouth daily    Chest pain, SOB (shortness of breath), Decreased exercise tolerance, Hyperlipidemia LDL goal <130, Family history of  coronary artery disease, Elevated fasting glucose       fenofibrate 145 MG tablet     90 tablet    TAKE 1 TABLET (145 MG) BY MOUTH DAILY    Hyperlipidemia LDL goal <130       FISH OIL PO       Chest pain, SOB (shortness of breath), Decreased exercise tolerance, Hyperlipidemia LDL goal <130, Family history of coronary artery disease, Elevated fasting glucose       metFORMIN 500 MG 24 hr tablet    GLUCOPHAGE-XR    90 tablet    TAKE 1 TABLET (500 MG) BY MOUTH DAILY (WITH DINNER)    Elevated fasting glucose       omeprazole 40 MG capsule    priLOSEC    90 capsule    TAKE 1 CAPSULE (40 MG) BY MOUTH DAILY TAKE 30-60 MINUTES BEFORE A MEAL.    Gastroesophageal reflux disease, esophagitis presence not specified

## 2018-06-15 LAB
ALBUMIN SERPL-MCNC: 4.3 G/DL (ref 3.4–5)
ALP SERPL-CCNC: 40 U/L (ref 40–150)
ALT SERPL W P-5'-P-CCNC: 48 U/L (ref 0–70)
ANION GAP SERPL CALCULATED.3IONS-SCNC: 8 MMOL/L (ref 3–14)
AST SERPL W P-5'-P-CCNC: 28 U/L (ref 0–45)
BILIRUB SERPL-MCNC: 0.4 MG/DL (ref 0.2–1.3)
BUN SERPL-MCNC: 21 MG/DL (ref 7–30)
CALCIUM SERPL-MCNC: 9.4 MG/DL (ref 8.5–10.1)
CHLORIDE SERPL-SCNC: 106 MMOL/L (ref 94–109)
CO2 SERPL-SCNC: 27 MMOL/L (ref 20–32)
CREAT SERPL-MCNC: 1.2 MG/DL (ref 0.66–1.25)
GFR SERPL CREATININE-BSD FRML MDRD: 63 ML/MIN/1.7M2
GLUCOSE SERPL-MCNC: 98 MG/DL (ref 70–99)
INR PPP: 1.05 (ref 0.86–1.14)
POTASSIUM SERPL-SCNC: 4.2 MMOL/L (ref 3.4–5.3)
PROT SERPL-MCNC: 7.6 G/DL (ref 6.8–8.8)
SODIUM SERPL-SCNC: 141 MMOL/L (ref 133–144)

## 2018-06-15 ASSESSMENT — PATIENT HEALTH QUESTIONNAIRE - PHQ9: 5. POOR APPETITE OR OVEREATING: NOT AT ALL

## 2018-06-15 ASSESSMENT — ANXIETY QUESTIONNAIRES

## 2018-06-16 ASSESSMENT — PATIENT HEALTH QUESTIONNAIRE - PHQ9: SUM OF ALL RESPONSES TO PHQ QUESTIONS 1-9: 0

## 2018-06-16 ASSESSMENT — ANXIETY QUESTIONNAIRES: GAD7 TOTAL SCORE: 0

## 2018-06-26 ENCOUNTER — THERAPY VISIT (OUTPATIENT)
Dept: PHYSICAL THERAPY | Facility: CLINIC | Age: 53
End: 2018-06-26
Payer: COMMERCIAL

## 2018-06-26 DIAGNOSIS — M25.511 ACUTE PAIN OF RIGHT SHOULDER: ICD-10-CM

## 2018-06-26 DIAGNOSIS — Z98.890 STATUS POST SURGERY: ICD-10-CM

## 2018-06-26 PROCEDURE — 97161 PT EVAL LOW COMPLEX 20 MIN: CPT | Mod: GP | Performed by: PHYSICAL THERAPIST

## 2018-06-26 PROCEDURE — 97110 THERAPEUTIC EXERCISES: CPT | Mod: GP | Performed by: PHYSICAL THERAPIST

## 2018-06-26 NOTE — MR AVS SNAPSHOT
After Visit Summary   6/26/2018    Willard Manzano    MRN: [id number]           Patient Information     Date Of Birth          1965        Visit Information        Provider Department      6/26/2018 3:00 PM Kwadwo Gomes, PT [facility] Physical Therapy        Today's Diagnoses     Status post surgery        Acute pain of right shoulder           Follow-ups after your visit        Your next 10 appointments already scheduled     Jun 28, 2018  3:40 PM CDT   YOKO Extremity with Kwadwo Gomes PT   [facility] ([facility]    [address]  [facility] [address]   [phone]            Jul 02, 2018  3:00 PM CDT   YOKO Extremity with Kwadwo Gomes PT   [facility]    [address]  [facility] [address] [phone]            Jul 05, 2018  3:00 PM CDT   YOKO Extremity with Kwadwo Gomes PT   [facility] ([facility]    [address]  [facility] [address] [phone]            Jul 09, 2018  3:00 PM CDT   YOKO Extremity with Kwadwo Gomes PT   [facility] Physical Therapy ([facility])    [address]  [facility] [address] [phone]              Who to contact     If you have questions or need follow up information about today's clinic visit or your schedule please contact [facility] PHYSICAL THERAPY directly at [phone].  Normal or non-critical lab and imaging results will be communicated to you by MyChart, letter or phone within 4 business days after the clinic has received the results. If you do not hear from us within 7 days, please contact the clinic through MyChart or phone. If you have a critical or abnormal lab result,  we will notify you by phone as soon as possible.  Submit refill requests through [employer] or call your pharmacy and they will forward the refill request to us. Please allow 3 business days for your refill to be completed.          Additional Information About Your Visit        [employer] Information     [employer] gives you secure access to your electronic health record. If you see a primary care provider, you can also send messages to your care team and make appointments. If you have questions, please call your primary care clinic.  If you do not have a primary care provider, please call [phone] and they will assist you.        Care EveryWhere ID     This is your Care EveryWhere ID. This could be used by other organizations to access your [facility] medical records  [id number]         Blood Pressure from Last 3 Encounters:   06/14/18 110/80   01/02/18 129/86   04/04/17 128/88    Weight from Last 3 Encounters:   06/14/18 93.9 kg (207 lb)   01/02/18 95.2 kg (209 lb 12.8 oz)   04/04/17 93.4 kg (206 lb)              We Performed the Following     HC PT EVAL, LOW COMPLEXITY     YOKO INITIAL EVAL REPORT     THERAPEUTIC EXERCISES        Primary Care Provider Office Phone # Fax #    Cassandra Villafana PA-C [phone] [address] [facility] [address]        Equal Access to Services     VICTOR HUGO DIAZ : Hadii aad ku hadasho Soomaali, waaxda luqadaha, qaybta kaalmada adeegyada, larissa parrish haydee sanchez . So [facility] [phone].    ATENCIÓN: Si habla español, tiene a goa disposición servicios gratuitos de asistencia lingüística. Llame al [phone].    We comply with applicable federal civil rights laws and [place] laws. We do not discriminate on the basis of race, color, national origin, age, disability, sex, sexual orientation, or gender identity.            Thank you!     Thank you for choosing [facility] PHYSICAL THERAPY  for your care. Our goal is  always to provide you with excellent care. Hearing back from our patients is one way we can continue to improve our services. Please take a few minutes to complete the written survey that you may receive in the mail after your visit with us. Thank you!             Your Updated Medication List - Protect others around you: Learn how to safely use, store and throw away your medicines at www.disposemymeds.org.          This list is accurate as of 6/26/18  5:03 PM.  Always use your most recent med list.                   Brand Name Dispense Instructions for use Diagnosis    aspirin 81 MG chewable tablet      Take 81 mg by mouth daily    Chest pain, SOB (shortness of breath), Decreased exercise tolerance, Hyperlipidemia LDL goal <130, Family history of coronary artery disease, Elevated fasting glucose       fenofibrate 145 MG tablet     90 tablet    TAKE 1 TABLET (145 MG) BY MOUTH DAILY    Hyperlipidemia LDL goal <130       FISH OIL PO       Chest pain, SOB (shortness of breath), Decreased exercise tolerance, Hyperlipidemia LDL goal <130, Family history of coronary artery disease, Elevated fasting glucose       metFORMIN 500 MG 24 hr tablet    GLUCOPHAGE-XR    90 tablet    TAKE 1 TABLET (500 MG) BY MOUTH DAILY (WITH DINNER)    Elevated fasting glucose       omeprazole 40 MG capsule    priLOSEC    90 capsule    TAKE 1 CAPSULE (40 MG) BY MOUTH DAILY TAKE 30-60 MINUTES BEFORE A MEAL.    Gastroesophageal reflux disease, esophagitis presence not specified

## 2018-06-26 NOTE — PROGRESS NOTES
[facility] Initial Evaluation  Subjective:  Patient is a 53 year old male presenting with rehab left shoulder hpi. The history is provided by the patient and the spouse. No  was used.   Willard Manzano is a 53 year old male with a right shoulder condition.      This is a new condition  Pt had R RCR with biceps tenodesis and SAQ on 6/21/18 without complication..    Patient reports pain:  In the joint.  Radiates to:  Shoulder and upper arm.  Pain is described as aching and is intermittent   Associated symptoms:  Loss of motion/stiffness and loss of strength. Pain is worse during the day.  Symptoms are exacerbated by lifting, using arm behind back, using arm overhead and using arm at shoulder level and relieved by analgesics and ice.  Since onset symptoms are unchanged.    Previous treatment includes physical therapy.  There was no improvement following previous treatment.  General health as reported by patient is good.  Pertinent medical history includes:  Diabetes.  Medical allergies: yes (see Epin).    Current medications:  None as reported by the patient.  Current occupation is   .  Patient is currently not working due to present treatment problem.  Primary job tasks include:  Lifting, operating a machine, prolonged sitting and repetitive tasks.    Barriers include:  None as reported by the patient.    Red flags:  None as reported by the patient.                        Objective:  System                   Shoulder Evaluation:  ROM:    PROM:  normal  Flexion:  Right: 45          Internal Rotation:  Right:  15  External Rotation:  Right:  15                          Palpation:      Right shoulder tenderness present at: Incisional                                     General     ROS    Assessment/Plan:    Patient is a 53 year old male with right side shoulder complaints.    Patient has the following significant findings with corresponding treatment plan.                 Diagnosis 1:  S/p R RCR with bicepstenodesis  Pain -  self management, education, directional preference exercise and home program  Decreased ROM/flexibility - manual therapy and therapeutic exercise  Decreased strength - therapeutic exercise and therapeutic activities  Impaired muscle performance - neuro re-education  Decreased function - therapeutic activities    Therapy Evaluation Codes:   1) History comprised of:   Personal factors that impact the plan of care:      None.    Comorbidity factors that impact the plan of care are:      Diabetes and Migraines/headaches.     Medications impacting care: None.  2) Examination of Body Systems comprised of:   Body structures and functions that impact the plan of care:      Shoulder.   Activity limitations that impact the plan of care are:      Lifting and Sleeping.  3) Clinical presentation characteristics are:   Stable/Uncomplicated.  4) Decision-Making    Low complexity using standardized patient assessment instrument and/or measureable assessment of functional outcome.  Cumulative Therapy Evaluation is: Low complexity.    Previous and current functional limitations:  (See Goal Flow Sheet for this information)    Short term and Long term goals: (See Goal Flow Sheet for this information)     Communication ability:  Patient appears to be able to clearly communicate and understand verbal and written communication and follow directions correctly.  Treatment Explanation - The following has been discussed with the patient:   RX ordered/plan of care  Anticipated outcomes  Possible risks and side effects  This patient would benefit from PT intervention to resume normal activities.   Rehab potential is good.    Frequency:  1 X week, once daily  Duration:  for 8 weeks  Discharge Plan:  Achieve all LTG.  Independent in home treatment program.  Reach maximal therapeutic benefit.    Please refer to the daily flowsheet for treatment today, total treatment time and time spent  performing 1:1 timed codes.

## 2018-06-26 NOTE — LETTER
[facility]  [address]  [facility] [address]  [phone]    2018    Re: Willard Manzano   :   1965  MRN:  [id number]   REFERRING PHYSICIAN:   Peng Cedeño    [facility]  Date of Initial Evaluation:  18  Visits:  Rxs Used: 1  Reason for Referral:   Status post surgery; Acute pain of right shoulder    EVALUATION SUMMARY    [facility] Initial Evaluation  Subjective:  Patient is a 53 year old male presenting with rehab left shoulder hpi. The history is provided by the patient and the spouse. No  was used.   Willard Manzano is a 53 year old male with a right shoulder condition.  This is a new condition  Pt had R RCR with biceps tenodesis and SAQ on 18 without complication.  Patient reports pain:  In the joint.  Radiates to:  Shoulder and upper arm.  Pain is described as aching and is intermittent   Associated symptoms:  Loss of motion/stiffness and loss of strength. Pain is worse during the day.  Symptoms are exacerbated by lifting, using arm behind back, using arm overhead and using arm at shoulder level and relieved by analgesics and ice.  Since onset symptoms are unchanged.  Previous treatment includes physical therapy.  There was no improvement following previous treatment.  General health as reported by patient is good.  Pertinent medical history includes:  Diabetes.  Medical allergies: yes (see Epin).    Current medications:  None as reported by the patient.  Current occupation is .  Patient is currently not working due to present treatment problem.  Primary job tasks include:  Lifting, operating a machine, prolonged sitting and repetitive tasks.  Barriers include:  None as reported by the patient.  Red flags:  None as reported by the patient.                Objective:      Shoulder Evaluation:  ROM:  PROM:   normal  Flexion:  Right: 45    Internal Rotation:  Right:  15  External Rotation:  Right:  15  Palpation:  Right shoulder tenderness present at: Incisional    Assessment/Plan:    Patient is a 53 year old male with right side shoulder complaints.    Patient has the following significant findings with corresponding treatment plan.                Diagnosis 1:  S/p R RCR with bicepstenodesis  Pain -  self management, education, directional preference exercise and home program  Decreased ROM/flexibility - manual therapy and therapeutic exercise  Decreased strength - therapeutic exercise and therapeutic activities  Impaired muscle performance - neuro re-education  Decreased function - therapeutic activities    Re: Willard Manzano   :   1965        Therapy Evaluation Codes:   1) History comprised of:   Personal factors that impact the plan of care:      None.    Comorbidity factors that impact the plan of care are:      Diabetes and Migraines/headaches.     Medications impacting care: None.  2) Examination of Body Systems comprised of:   Body structures and functions that impact the plan of care:      Shoulder.   Activity limitations that impact the plan of care are:      Lifting and Sleeping.  3) Clinical presentation characteristics are:   Stable/Uncomplicated.  4) Decision-Making    Low complexity using standardized patient assessment instrument and/or measureable assessment of functional outcome.  Cumulative Therapy Evaluation is: Low complexity.    Previous and current functional limitations:  (See Goal Flow Sheet for this information)    Short term and Long term goals: (See Goal Flow Sheet for this information)     Communication ability:  Patient appears to be able to clearly communicate and understand verbal and written communication and follow directions correctly.  Treatment Explanation - The following has been discussed with the patient:   RX ordered/plan of care  Anticipated outcomes  Possible risks and  side effects  This patient would benefit from PT intervention to resume normal activities.   Rehab potential is good.    Frequency:  1 X week, once daily  Duration:  for 8 weeks  Discharge Plan:  Achieve all LTG.  Independent in home treatment program.  Reach maximal therapeutic benefit.    Thank you for your referral.    INQUIRIES  Therapist:  Kwadwo Gomes, PT  [facility] [place] [facility]  [address] [place] [address]  Phone: [phone]  Fax: [phone]

## 2018-06-28 ENCOUNTER — THERAPY VISIT (OUTPATIENT)
Dept: PHYSICAL THERAPY | Facility: CLINIC | Age: 53
End: 2018-06-28
Payer: COMMERCIAL

## 2018-06-28 DIAGNOSIS — M25.511 ACUTE PAIN OF RIGHT SHOULDER: ICD-10-CM

## 2018-06-28 DIAGNOSIS — Z98.890 STATUS POST SURGERY: ICD-10-CM

## 2018-06-28 PROCEDURE — 97110 THERAPEUTIC EXERCISES: CPT | Mod: GP | Performed by: PHYSICAL THERAPIST

## 2018-06-29 ENCOUNTER — TRANSFERRED RECORDS (OUTPATIENT)
Dept: HEALTH INFORMATION MANAGEMENT | Facility: CLINIC | Age: 53
End: 2018-06-29

## 2018-07-02 ENCOUNTER — THERAPY VISIT (OUTPATIENT)
Dept: PHYSICAL THERAPY | Facility: CLINIC | Age: 53
End: 2018-07-02
Payer: COMMERCIAL

## 2018-07-02 DIAGNOSIS — Z98.890 STATUS POST SURGERY: ICD-10-CM

## 2018-07-02 DIAGNOSIS — M25.511 ACUTE PAIN OF RIGHT SHOULDER: ICD-10-CM

## 2018-07-02 PROCEDURE — 97110 THERAPEUTIC EXERCISES: CPT | Mod: GP | Performed by: PHYSICAL THERAPIST

## 2018-07-09 ENCOUNTER — THERAPY VISIT (OUTPATIENT)
Dept: PHYSICAL THERAPY | Facility: CLINIC | Age: 53
End: 2018-07-09
Payer: COMMERCIAL

## 2018-07-09 ENCOUNTER — TRANSFERRED RECORDS (OUTPATIENT)
Dept: HEALTH INFORMATION MANAGEMENT | Facility: CLINIC | Age: 53
End: 2018-07-09

## 2018-07-09 DIAGNOSIS — M25.511 ACUTE PAIN OF RIGHT SHOULDER: ICD-10-CM

## 2018-07-09 DIAGNOSIS — Z98.890 STATUS POST SURGERY: ICD-10-CM

## 2018-07-09 PROCEDURE — 97110 THERAPEUTIC EXERCISES: CPT | Mod: GP | Performed by: PHYSICAL THERAPIST

## 2018-07-12 ENCOUNTER — OFFICE VISIT (OUTPATIENT)
Dept: FAMILY MEDICINE | Facility: CLINIC | Age: 53
End: 2018-07-12
Payer: COMMERCIAL

## 2018-07-12 VITALS
BODY MASS INDEX: 29.84 KG/M2 | DIASTOLIC BLOOD PRESSURE: 88 MMHG | WEIGHT: 205 LBS | SYSTOLIC BLOOD PRESSURE: 136 MMHG | RESPIRATION RATE: 18 BRPM | HEART RATE: 85 BPM | TEMPERATURE: 98.1 F

## 2018-07-12 DIAGNOSIS — J01.00 ACUTE NON-RECURRENT MAXILLARY SINUSITIS: Primary | ICD-10-CM

## 2018-07-12 DIAGNOSIS — G43.009 MIGRAINE WITHOUT AURA AND WITHOUT STATUS MIGRAINOSUS, NOT INTRACTABLE: ICD-10-CM

## 2018-07-12 PROCEDURE — 99214 OFFICE O/P EST MOD 30 MIN: CPT | Performed by: PHYSICIAN ASSISTANT

## 2018-07-12 RX ORDER — RIVAROXABAN 15 MG/1
1 TABLET, FILM COATED ORAL 2 TIMES DAILY
Refills: 0 | COMMUNITY
Start: 2018-07-09 | End: 2019-11-13

## 2018-07-12 RX ORDER — SUMATRIPTAN 50 MG/1
50 TABLET, FILM COATED ORAL
Qty: 9 TABLET | Refills: 1 | Status: SHIPPED | OUTPATIENT
Start: 2018-07-12 | End: 2019-11-13

## 2018-07-12 NOTE — MR AVS SNAPSHOT
After Visit Summary   7/12/2018    Willard Manzano    MRN: [id number]           Patient Information     Date Of Birth          1965        Visit Information        Provider Department      7/12/2018 2:00 PM Cassandra Villafana PA-C [facility]         Follow-ups after your visit        Your next 10 appointments already scheduled     Jul 23, 2018  8:50 AM CDT   YOKO Extremity with Karey Srivastava, BRIAN   [facility]    [address]  [facility] [address] [phone]              Who to contact     If you have questions or need follow up information about today's clinic visit or your schedule please contact [facility] directly at [phone].  Normal or non-critical lab and imaging results will be communicated to you by MyChart, letter or phone within 4 business days after the clinic has received the results. If you do not hear from us within 7 days, please contact the clinic through MyChart or phone. If you have a critical or abnormal lab result, we will notify you by phone as soon as possible.  Submit refill requests through [employer] or call your pharmacy and they will forward the refill request to us. Please allow 3 business days for your refill to be completed.          Additional Information About Your Visit        MyChart Information     [employer] gives you secure access to your electronic health record. If you see a primary care provider, you can also send messages to your care team and make appointments. If you have questions, please call your primary care clinic.  If you do not have a primary care provider, please call [phone] and they will assist you.        Care EveryWhere ID     This is your Care EveryWhere ID. This could be used by other organizations to access your [facility] medical records  [id number]        Your Vitals Were     Pulse Temperature Respirations BMI  (Body Mass Index)          85 98.1  F (36.7  C) (Oral) 18 29.84 kg/m2         Blood Pressure from Last 3 Encounters:   07/12/18 136/88   06/14/18 110/80   01/02/18 129/86    Weight from Last 3 Encounters:   07/12/18 205 lb (93 kg)   06/14/18 207 lb (93.9 kg)   01/02/18 209 lb 12.8 oz (95.2 kg)              Today, you had the following     No orders found for display       Primary Care Provider Office Phone # Fax #    Cassandra Villafana PA-C [phone] [address] [facility] [address]        Equal Access to Services     VICTOR HUGO DIAZ : Siri Ramirez, waashok govea, qaybta kaalmada ana, larissa sanchez . So [facility] [phone].    ATENCIÓN: Si habla español, tiene a gao disposición servicios gratuitos de asistencia lingüística. Llame al [phone].    We comply with applicable federal civil rights laws and [place] laws. We do not discriminate on the basis of race, color, national origin, age, disability, sex, sexual orientation, or gender identity.            Thank you!     Thank you for choosing [facility]  for your care. Our goal is always to provide you with excellent care. Hearing back from our patients is one way we can continue to improve our services. Please take a few minutes to complete the written survey that you may receive in the mail after your visit with us. Thank you!             Your Updated Medication List - Protect others around you: Learn how to safely use, store and throw away your medicines at www.disposemymeds.org.          This list is accurate as of 7/12/18  2:00 PM.  Always use your most recent med list.                   Brand Name Dispense Instructions for use Diagnosis    fenofibrate 145 MG tablet     90 tablet    TAKE 1 TABLET (145 MG) BY MOUTH DAILY    Hyperlipidemia LDL goal <130       FISH OIL PO       Chest pain, SOB (shortness of breath), Decreased exercise tolerance, Hyperlipidemia LDL goal  <130, Family history of coronary artery disease, Elevated fasting glucose       metFORMIN 500 MG 24 hr tablet    GLUCOPHAGE-XR    90 tablet    TAKE 1 TABLET (500 MG) BY MOUTH DAILY (WITH DINNER)    Elevated fasting glucose       omeprazole 40 MG capsule    priLOSEC    90 capsule    TAKE 1 CAPSULE (40 MG) BY MOUTH DAILY TAKE 30-60 MINUTES BEFORE A MEAL.    Gastroesophageal reflux disease, esophagitis presence not specified       XARELTO 15 MG Tabs tablet   Generic drug:  rivaroxaban ANTICOAGULANT      Take 1 tablet by mouth 2 times daily

## 2018-07-12 NOTE — PROGRESS NOTES
SUBJECTIVE:   Willard Manzano is a 53 year old male who presents to clinic today for the following health issues:      Headache  Onset: X 4 days    Description:   Location: bilateral in the frontal area, left sided   Character: sharp, similar to migraine  Frequency:  Daily X 4 days  Duration:  Non resolving X 4 days    Intensity: moderate, severe    Progression of Symptoms:  intermittent    Accompanying Signs & Symptoms:  Stiff neck: YES- but wearing a sling for recent surgery on shoulder  Neck or upper back pain: YES- as noted above  Fever: no  Sinus pressure: YES  Nausea or vomiting: YES  Dizziness: no   Numbness: YES- hands, L > R  Weakness: no  Visual changes: no    History:   Head trauma: YES- previous concussions, years ago.  2007 had spontaneous spinal leak  Family history of migraines: no  Previous tests for headaches: no  Neurologist evaluations: no  Able to do daily activities: no  Wake with a headaches: YES  Do headaches wake you up: no   Daily pain medication use: no  Work/school stressors/changes: no    Precipitating factors:   Does light make it worse: YES- occasionally  Does sound make it worse: YES    Alleviating factors:  Does sleep help: YES    Therapies Tried and outcome: none, as he can not take Aspirin due to Xeralto rx, has tried Tylenol, but this didn't help much        Problem list and histories reviewed & adjusted, as indicated.  Additional history: as documented    Patient Active Problem List   Diagnosis     Chiari malformation type I (H)     Hyperlipidemia LDL goal <160     Family history of colon cancer     Rotator cuff tendinitis     Elevated fasting glucose     Rotator cuff tear     Family history of coronary artery disease     Esophageal reflux     Benign non-nodular prostatic hyperplasia with lower urinary tract symptoms     Status post surgery     Acute pain of right shoulder     Past Surgical History:   Procedure Laterality Date     COLONOSCOPY  12/29/10    non-adenomatous polyp.  Advised 5 yr f/u.      HC COLONOSCOPY THRU STOMA, DIAGNOSTIC  08    normal. 3 yr f/u due to brother CA age 43.     HC REMOVAL OF TONSILS,<11 Y/O       HC VASECTOMY UNILAT/BILAT W POSTOP SEMEN       SURGICAL HISTORY OF -   2006    umbilical hernia repair with mesh       Social History   Substance Use Topics     Smoking status: Never Smoker     Smokeless tobacco: Never Used     Alcohol use 0.0 oz/week     0 Standard drinks or equivalent per week      Comment:  once a week     Family History   Problem Relation Age of Onset     Diabetes Mother      dx in her 40's. overwt. Now IDDM.     Hypertension Mother      Lipids Mother      Cancer Father       lung CA with brain mets age 65     Cancer - colorectal Brother      Gideon.  Dx age 45      Cardiovascular Brother      Loi. Brother MI at age 49. Had another stent at age 52     Breast Cancer Sister      Latricia. Dx bilateral breast CA at age 56.      Family History Negative Sister      Stacey.     Family History Negative Sister      Tom.      Family History Negative Sister      Mayra.      Allergies Son      Family History Negative Son      Family History Negative Daughter            Reviewed and updated as needed this visit by clinical staff  Tobacco  Allergies  Meds  Med Hx  Surg Hx  Fam Hx  Soc Hx      Reviewed and updated as needed this visit by Provider         ROS:  Constitutional, HEENT, cardiovascular, pulmonary, gi and gu systems are negative, except as otherwise noted.    OBJECTIVE:     /88 (BP Location: Left arm, Patient Position: Chair, Cuff Size: Adult Large)  Pulse 85  Temp 98.1  F (36.7  C) (Oral)  Resp 18  Wt 205 lb (93 kg)  BMI 29.84 kg/m2  Body mass index is 29.84 kg/(m^2).  GENERAL APPEARANCE: healthy, alert and no distress  HENT: ear canals and TM's normal, nasal mucosa edematous without rhinorrhea and maxillary sinus tenderness left  RESP: lungs clear to auscultation - no rales, rhonchi or wheezes  CV: regular rates and rhythm,  normal S1 S2, no S3 or S4 and no murmur, click or rub  NEURO: Normal strength and tone, mentation intact, speech normal and cranial nerves 2-12 intact  PSYCH: mentation appears normal and affect normal/bright        ASSESSMENT/PLAN:             1. Acute non-recurrent maxillary sinusitis  Nasal saline spray  - amoxicillin-clavulanate (AUGMENTIN) 875-125 MG per tablet; Take 1 tablet by mouth 2 times daily  Dispense: 20 tablet; Refill: 0    2. Migraine without aura and without status migrainosus, not intractable  Risks/benefits of medication use discussed with patient. Patient reports understanding and accepts trial of medication.    - SUMAtriptan (IMITREX) 50 MG tablet; Take 1 tablet (50 mg) by mouth at onset of headache for migraine May repeat in 2 hours. Max 4 tablets/24 hours.  Dispense: 9 tablet; Refill: 1        Cassandra Villafana PA-C  [facility]

## 2018-07-27 DIAGNOSIS — K21.9 GASTROESOPHAGEAL REFLUX DISEASE, ESOPHAGITIS PRESENCE NOT SPECIFIED: ICD-10-CM

## 2018-07-27 RX ORDER — OMEPRAZOLE 40 MG/1
CAPSULE, DELAYED RELEASE ORAL
Qty: 90 CAPSULE | Refills: 1 | Status: SHIPPED | OUTPATIENT
Start: 2018-07-27 | End: 2019-02-07

## 2018-07-27 NOTE — TELEPHONE ENCOUNTER
Prescription approved per [facility]ill Protocol.    Lizbeth COBOS RN, BSN, PHN  [place] [facility]

## 2018-07-27 NOTE — TELEPHONE ENCOUNTER
"Requested Prescriptions   Pending Prescriptions Disp Refills     omeprazole (PRILOSEC) 40 MG capsule [Pharmacy Med Name: OMEPRAZOLE DR 40 MG CAPSULE]    Last Written Prescription Date:  11/3/17  Last Fill Quantity: 90 capsules,  # refills: 1   Last office visit: 7/12/2018 with prescribing provider:  Broderick   Future Office Visit:     90 capsule 1     Sig: TAKE 1 CAPSULE (40 MG) BY MOUTH DAILY TAKE 30-60 MINUTES BEFORE A MEAL.    PPI Protocol Passed    7/27/2018  1:27 AM       Passed - Not on Clopidogrel (unless Pantoprazole ordered)       Passed - No diagnosis of osteoporosis on record       Passed - Recent (12 mo) or future (30 days) visit within the authorizing provider's specialty    Patient had office visit in the last 12 months or has a visit in the next 30 days with authorizing provider or within the authorizing provider's specialty.  See \"Patient Info\" tab in inbasket, or \"Choose Columns\" in Meds & Orders section of the refill encounter.           Passed - Patient is age 18 or older          "

## 2018-07-30 ENCOUNTER — THERAPY VISIT (OUTPATIENT)
Dept: PHYSICAL THERAPY | Facility: CLINIC | Age: 53
End: 2018-07-30
Payer: COMMERCIAL

## 2018-07-30 DIAGNOSIS — M25.511 ACUTE PAIN OF RIGHT SHOULDER: ICD-10-CM

## 2018-07-30 DIAGNOSIS — Z98.890 STATUS POST SURGERY: ICD-10-CM

## 2018-07-30 PROCEDURE — 97110 THERAPEUTIC EXERCISES: CPT | Mod: GP | Performed by: PHYSICAL THERAPIST

## 2018-07-30 PROCEDURE — 97112 NEUROMUSCULAR REEDUCATION: CPT | Mod: GP | Performed by: PHYSICAL THERAPIST

## 2018-07-30 NOTE — LETTER
[facility] PHYSICAL THERAPY  [address]  [facility] [address]  [phone]    2018    Re: Willard Manzano   :   1965  MRN:  [id number]   REFERRING PHYSICIAN:   Peng Cedeño    [facility] PHYSICAL [facility]  Date of Initial Evaluation:  18  Visits:  Rxs Used: 5  Reason for Referral:     Acute pain of right shoulder  Status post surgery              PROGRESS  REPORT  Progress reporting period is from 18 to 18.       SUBJECTIVE  Subjective changes noted by patient:  Patient reports he has been out of sling for about 2 weeks. Protocol states sling for 6 weeks, but patient has not been in to PT since 18 and he thought he was to remove it after 4 weeks. Patient is currently 5 1/2 weeks post op. He is no longer using ice, despite pain which has increased since discontinuing the sling.  Minimal pain in forearm where blood clot was, but there is still some swelling. Patient is currently off work.     Current pain level is 5-6/10    Previous pain level was  6/10.   Changes in function:  Yes (See Goal flowsheet attached for changes in current functional level)  Adverse reaction to treatment or activity: None    OBJECTIVE  Changes noted in objective findings:  Yes,  R shoulder PROM flexion 165, extension 70, abduction 155, ER 82, IR 90. AROM f;exopm 145. extension 65, abduction 85, ER 75, IR 85. Fair humeral depression with active flexion, poor with active abduction.      ASSESSMENT/PLAN  Updated problem list and treatment plan: Diagnosis 1:  S/p arthroscopic small R rotator cuff repair with DCR and biceps tenodesis 18  Pain -  hot/cold therapy and manual therapy  Decreased ROM/flexibility - manual therapy and therapeutic exercise  Decreased strength - therapeutic exercise and therapeutic activities  Impaired muscle performance - neuro re-education  Decreased function - therapeutic  activities  STG/LTGs have been met or progress has been made towards goals:  Yes (See Goal flow sheet completed today.)  Assessment of Progress: The patient's condition is improving.  Self Management Plans:  Patient has been instructed in a home treatment program.        Willard Manzano           I have re-evaluated this patient and find that the nature, scope, duration and intensity of the therapy is appropriate for the medical condition of the patient.  Willard continues to require the following intervention to meet STG and LTG's:  PT    Recommendations:  This patient would benefit from continued therapy.     Frequency:  1 X week, once daily  Duration:  for 8 weeks    Please refer to the daily flowsheet for treatment today, total treatment time and time spent performing 1:1 timed codes.    Thank you for your referral.    INQUIRIES  Therapist: Karey Srivastava, PT  [facility]  [address] [place] [address]  Phone: [phone]  Fax: [phone]

## 2018-07-30 NOTE — PROGRESS NOTES
Subjective:  HPI                    Objective:  System    Physical Exam    General     ROS    Assessment/Plan:    PROGRESS  REPORT    Progress reporting period is from 6/26/18 to 7/30/18.       SUBJECTIVE  Subjective changes noted by patient:  Patient reports he has been out of sling for about 2 weeks. Protocol states sling for 6 weeks, but patient has not been in to PT since 7/9/18 and he thought he was to remove it after 4 weeks. Patient is currently 5 1/2 weeks post op. He is no longer using ice, despite pain which has increased since discontinuing the sling.  Minimal pain in forearm where blood clot was, but there is still some swelling. Patient is currently off work.     Current pain level is 5-6/10    Previous pain level was  6/10.   Changes in function:  Yes (See Goal flowsheet attached for changes in current functional level)  Adverse reaction to treatment or activity: None    OBJECTIVE  Changes noted in objective findings:  Yes,  R shoulder PROM flexion 165, extension 70, abduction 155, ER 82, IR 90. AROM f;exopm 145. extension 65, abduction 85, ER 75, IR 85. Fair humeral depression with active flexion, poor with active abduction.      ASSESSMENT/PLAN  Updated problem list and treatment plan: Diagnosis 1:  S/p arthroscopic small R rotator cuff repair with DCR and biceps tenodesis 6/21/18  Pain -  hot/cold therapy and manual therapy  Decreased ROM/flexibility - manual therapy and therapeutic exercise  Decreased strength - therapeutic exercise and therapeutic activities  Impaired muscle performance - neuro re-education  Decreased function - therapeutic activities  STG/LTGs have been met or progress has been made towards goals:  Yes (See Goal flow sheet completed today.)  Assessment of Progress: The patient's condition is improving.  Self Management Plans:  Patient has been instructed in a home treatment program.  I have re-evaluated this patient and find that the nature, scope, duration and intensity of the  therapy is appropriate for the medical condition of the patient.  Willard continues to require the following intervention to meet STG and LTG's:  PT    Recommendations:  This patient would benefit from continued therapy.     Frequency:  1 X week, once daily  Duration:  for 8 weeks        Please refer to the daily flowsheet for treatment today, total treatment time and time spent performing 1:1 timed codes.

## 2018-07-30 NOTE — MR AVS SNAPSHOT
After Visit Summary   7/30/2018    Willard Manzano    MRN: [id number]           Patient Information     Date Of Birth          1965        Visit Information        Provider Department      7/30/2018 8:50 AM Karey Srivastava, PT [facility] Physical Therapy        Today's Diagnoses     Acute pain of right shoulder        Status post surgery           Follow-ups after your visit        Your next 10 appointments already scheduled     Aug 13, 2018  9:10 AM CDT   YOKO Extremity with Kwadwo Gomes PT   [facility] ([facility])    [address]  [facility] [address] [phone]              Who to contact     If you have questions or need follow up information about today's clinic visit or your schedule please contact [facility] PHYSICAL THERAPY directly at [phone].  Normal or non-critical lab and imaging results will be communicated to you by MyChart, letter or phone within 4 business days after the clinic has received the results. If you do not hear from us within 7 days, please contact the clinic through [employer] or phone. If you have a critical or abnormal lab result, we will notify you by phone as soon as possible.  Submit refill requests through [employer] or call your pharmacy and they will forward the refill request to us. Please allow 3 business days for your refill to be completed.          Additional Information About Your Visit        MyChart Information     [employer] gives you secure access to your electronic health record. If you see a primary care provider, you can also send messages to your care team and make appointments. If you have questions, please call your primary care clinic.  If you do not have a primary care provider, please call [phone] and they will assist you.        Care EveryWhere ID     This is your Care EveryWhere ID. This could  be used by other organizations to access your [place] medical records  [id number]         Blood Pressure from Last 3 Encounters:   07/12/18 136/88   06/14/18 110/80   01/02/18 129/86    Weight from Last 3 Encounters:   07/12/18 93 kg (205 lb)   06/14/18 93.9 kg (207 lb)   01/02/18 95.2 kg (209 lb 12.8 oz)              We Performed the Following     YOKO PROGRESS NOTES REPORT     NEUROMUSCULAR RE-EDUCATION     THERAPEUTIC EXERCISES        Primary Care Provider Office Phone # Fax #    Cassandra Villafana PA-C [phone]       [address] [facility] [address]        Equal Access to Services     VICTOR HUGO DIAZ : Hadii gold chahalo Sorex, waaxda luqadaha, qaybta kaalmada adelatoniayada, larissa sanchez . So [facility] [phone].    ATENCIÓN: Si habla español, tiene a gao disposición servicios gratuitos de asistencia lingüística. Llame al [phone].    We comply with applicable federal civil rights laws and [place] laws. We do not discriminate on the basis of race, color, national origin, age, disability, sex, sexual orientation, or gender identity.            Thank you!     Thank you for choosing [place] [facility] PHYSICAL THERAP[facility]  for your care. Our goal is always to provide you with excellent care. Hearing back from our patients is one way we can continue to improve our services. Please take a few minutes to complete the written survey that you may receive in the mail after your visit with us. Thank you!             Your Updated Medication List - Protect others around you: Learn how to safely use, store and throw away your medicines at www.disposemymeds.org.          This list is accurate as of 7/30/18 11:27 AM.  Always use your most recent med list.                   Brand Name Dispense Instructions for use Diagnosis    amoxicillin-clavulanate 875-125 MG per tablet    AUGMENTIN    20 tablet    Take 1 tablet by mouth 2 times daily    Acute  non-recurrent maxillary sinusitis       fenofibrate 145 MG tablet     90 tablet    TAKE 1 TABLET (145 MG) BY MOUTH DAILY    Hyperlipidemia LDL goal <130       FISH OIL PO       Chest pain, SOB (shortness of breath), Decreased exercise tolerance, Hyperlipidemia LDL goal <130, Family history of coronary artery disease, Elevated fasting glucose       metFORMIN 500 MG 24 hr tablet    GLUCOPHAGE-XR    90 tablet    TAKE 1 TABLET (500 MG) BY MOUTH DAILY (WITH DINNER)    Elevated fasting glucose       omeprazole 40 MG capsule    priLOSEC    90 capsule    TAKE 1 CAPSULE (40 MG) BY MOUTH DAILY TAKE 30-60 MINUTES BEFORE A MEAL.    Gastroesophageal reflux disease, esophagitis presence not specified       SUMAtriptan 50 MG tablet    IMITREX    9 tablet    Take 1 tablet (50 mg) by mouth at onset of headache for migraine May repeat in 2 hours. Max 4 tablets/24 hours.    Migraine without aura and without status migrainosus, not intractable       XARELTO 15 MG Tabs tablet   Generic drug:  rivaroxaban ANTICOAGULANT      Take 1 tablet by mouth 2 times daily

## 2018-08-13 ENCOUNTER — THERAPY VISIT (OUTPATIENT)
Dept: PHYSICAL THERAPY | Facility: CLINIC | Age: 53
End: 2018-08-13
Payer: COMMERCIAL

## 2018-08-13 DIAGNOSIS — Z98.890 STATUS POST SURGERY: ICD-10-CM

## 2018-08-13 DIAGNOSIS — M25.511 ACUTE PAIN OF RIGHT SHOULDER: ICD-10-CM

## 2018-08-13 PROCEDURE — 97110 THERAPEUTIC EXERCISES: CPT | Mod: GP | Performed by: PHYSICAL THERAPIST

## 2018-08-14 ENCOUNTER — HOSPITAL ENCOUNTER (OUTPATIENT)
Dept: LAB | Facility: CLINIC | Age: 53
Discharge: HOME | End: 2018-08-14
Attending: INTERNAL MEDICINE | Admitting: INTERNAL MEDICINE
Payer: COMMERCIAL

## 2018-08-14 ENCOUNTER — OFFICE VISIT (OUTPATIENT)
Dept: SURGERY | Facility: CLINIC | Age: 53
End: 2018-08-14
Payer: COMMERCIAL

## 2018-08-14 VITALS
DIASTOLIC BLOOD PRESSURE: 80 MMHG | HEIGHT: 70 IN | RESPIRATION RATE: 16 BRPM | OXYGEN SATURATION: 95 % | BODY MASS INDEX: 29.35 KG/M2 | HEART RATE: 88 BPM | WEIGHT: 205 LBS | SYSTOLIC BLOOD PRESSURE: 120 MMHG

## 2018-08-14 DIAGNOSIS — I80.8 SUPERFICIAL THROMBOPHLEBITIS OF RIGHT UPPER EXTREMITY: ICD-10-CM

## 2018-08-14 DIAGNOSIS — M75.81 TENDINITIS OF RIGHT ROTATOR CUFF: ICD-10-CM

## 2018-08-14 DIAGNOSIS — M75.81 TENDINITIS OF RIGHT ROTATOR CUFF: Primary | ICD-10-CM

## 2018-08-14 LAB — D DIMER PPP FEU-MCNC: <0.3 UG/ML FEU (ref 0–0.5)

## 2018-08-14 PROCEDURE — 85379 FIBRIN DEGRADATION QUANT: CPT | Performed by: INTERNAL MEDICINE

## 2018-08-14 PROCEDURE — 36415 COLL VENOUS BLD VENIPUNCTURE: CPT | Performed by: INTERNAL MEDICINE

## 2018-08-14 PROCEDURE — 99204 OFFICE O/P NEW MOD 45 MIN: CPT | Performed by: INTERNAL MEDICINE

## 2018-08-14 NOTE — PROGRESS NOTES
Vascular Medicine Consultation     Chief Complaint   Superficial thrombophlebitis    Date of Admission:  (Not on file)    Willard Manzano is a 53 year old male who was admitted on (Not on file). I was asked to see the patient for superficial thrombophlebitis.    Code Status    Full code    Reason for Consult   Reason for consult: I was asked by PCP to evaluate this patient for superficial thrombophlebitis.    Primary Care Physician   Cassandra Villafana      History is obtained from the patient    History of Present Illness   Willard Manzano is a 53 year old male who presents with superficial thrombophlebitis right upper extremity, patient recently had rotator cuff surgery laparoscopic, and post surgery by 2 days he had superficial thrombophlebitis diagnosed with ultrasound with no arm swelling just localized inflammation patient has no prior history of blood clots, no family history of blood clots, patient is on no hormonal treatment, the only trauma he had was surgery and no history of IV line insertion at that site  Patient was started on Xarelto 15 mg p.o. twice daily for 3 weeks then currently he is on 20 mg p.o. daily with 6 pills remaining  Patient is not complaining of any symptoms other than a slight cordlike structure in the medial aspect of the arm right side and as a said no pain no swelling and no visible veins in the anterior chest wall or the neck    Past Medical History   I have reviewed this patient's medical history and updated it with pertinent information if needed.   Past Medical History:   Diagnosis Date     Blood clotting disorder (H)      Cancer (H)     Skin     Chiari malformation type I (H)      Elevated fasting glucose 1/19/2012     Headaches, migraine      Headaches, migraine      Other and unspecified hyperlipidemia      Rotator cuff tear 1/22/2012     Rotator cuff tear 1/22/2012     Rotator cuff tendinitis 1/2011    Inj. Dr. Cheney.      Spinal headache 2007    spontaneous spinal fluid leak  - 2 blood patches     Urethral polyp 6-24-10    Dr. Card     Urethral polyp 2010    Dr. Card        Past Surgical History   I have reviewed this patient's surgical history and updated it with pertinent information if needed.  Past Surgical History:   Procedure Laterality Date     COLONOSCOPY  12/29/10    non-adenomatous polyp. Advised 5 yr f/u.      HC COLONOSCOPY THRU STOMA, DIAGNOSTIC  08    normal. 3 yr f/u due to brother CA age 43.     HC REMOVAL OF TONSILS,<13 Y/O       HC VASECTOMY UNILAT/BILAT W POSTOP SEMEN       HERNIA REPAIR       SURGICAL HISTORY OF -   2006    umbilical hernia repair with mesh       Prior to Admission Medications   Cannot display prior to admission medications because the patient has not been admitted in this contact.     Allergies   Allergies   Allergen Reactions     Cats      Congestion and drainage     No Known Allergies        Social History   I have reviewed this patient's social history and updated it with pertinent information if needed. Willard Manzano  reports that he has never smoked. He has never used smokeless tobacco. He reports that he drinks alcohol. He reports that he does not use illicit drugs.    Family History   I have reviewed this patient's family history and updated it with pertinent information if needed.   Family History   Problem Relation Age of Onset     Diabetes Mother      dx in her 40's. overwt. Now IDDM.     Hypertension Mother      Lipids Mother      Gallbladder Disease Mother      Cancer Father       lung CA with brain mets age 65     Other Cancer Father      Substance Abuse Father      Cancer - colorectal Brother      Gideon.  Dx age 45      Cardiovascular Brother      Loi. Brother MI at age 49. Had another stent at age 52     Colon Cancer Brother      Other Cancer Brother      Breast Cancer Sister      Latricia. Dx bilateral breast CA at age 56.      Other Cancer Sister      Family History Negative Sister      Stacey.     Family History  Negative Sister      Tom.      Family History Negative Sister      Mayra.      Allergies Son      Substance Abuse Son      Family History Negative Son      Family History Negative Daughter        Review of Systems   The 10 point Review of Systems is negative other than noted in the HPI or here.     Physical Exam       BP: 120/80 Pulse: 88   Resp: 16 SpO2: 95 %      Vital Signs with Ranges  Pulse:  [88] 88  Resp:  [16] 16  BP: (120)/(80) 120/80  SpO2:  [95 %] 95 %  205 lbs 0 oz    Constitutional: awake, alert, cooperative, no apparent distress, and appears stated age  Eyes: Lids and lashes normal, pupils equal, round and reactive to light, extra ocular muscles intact, sclera clear, conjunctiva normal  ENT: normocepalic, without obvious abnormality, oropharynx pink and moist  Hematologic / Lymphatic: no lymphadenopathy  Respiratory: No increased work of breathing, good air exchange, clear to auscultation bilaterally, no crackles or wheezing  Cardiovascular: regular rate and rhythm, normal S1 and S2 and no murmur noted  GI: Normal bowel sounds, soft, non-distended, non-tender  Skin: no redness, warmth, or swelling, no rashes and no lesions  Musculoskeletal: There is no redness, warmth, or swelling of the joints.  Full range of motion noted.  Motor strength is 5 out of 5 all extremities bilaterally.  Tone is normal.  Neurologic: Awake, alert, oriented to name, place and time.  Cranial nerves II-XII are grossly intact.  Motor is 5 out of 5 bilaterally.    Neuropsychiatric:  Normal affect, memory, insight.  Pulses: Positive pulses bilateral and equal, no arm swelling no visible prominent veins in the upper arm anterior chest or lower neck  . No carotid bruits appreciated.     Data   Most Recent 3 CBC's:  Recent Labs   Lab Test  06/14/18   1600  10/25/16   0751  12/30/15   0815   WBC  5.3  4.6  4.8   HGB  16.0  16.0  16.3   MCV  88  88  87   PLT  320  280  317     Most Recent 3 BMP's:  Recent Labs   Lab Test  06/14/18    1600  11/24/17   0810  08/20/14   1333   NA  141  140  138   POTASSIUM  4.2  4.3  4.1   CHLORIDE  106  106  103   CO2  27  26  27   BUN  21 18 17   CR  1.20  1.12  1.13   ANIONGAP  8  8  8   MAR  9.4  9.1  9.3   GLC  98  111*  99     Most Recent 2 LFT's:  Recent Labs   Lab Test  06/14/18   1600  11/24/17   0810   AST  28  27   ALT  48  48   ALKPHOS  40  32*   BILITOTAL  0.4  0.5     Most Recent 3 Creatinines:  Recent Labs   Lab Test  06/14/18   1600  11/24/17   0810  08/20/14   1333   CR  1.20  1.12  1.13     Most Recent TSH and T4:  Recent Labs   Lab Test  08/09/13   1627   TSH  1.51     Most Recent Hemoglobin A1c:  Recent Labs   Lab Test  06/14/18   1600   A1C  6.1*     Most Recent 6 glucoses:  Recent Labs   Lab Test  06/14/18   1600  11/24/17   0810  08/20/14   1333  08/09/13   1627  08/01/13   0824  01/10/13   1230   GLC  98  111*  99  100*  105*  96     Most Recent Urinalysis:  Recent Labs   Lab Test  01/02/18   1627   COLOR  Yellow   APPEARANCE  Clear   URINEGLC  Negative   URINEBILI  Negative   URINEKETONE  Negative   SG  1.020   UBLD  Negative   URINEPH  7.0   PROTEIN  Negative   UROBILINOGEN  0.2   NITRITE  Negative   LEUKEST  Negative         Assessment & Plan   (M75.81) Tendinitis of right rotator cuff  (primary encounter diagnosis)  Comment: Patient is status post surgery, stable  Plan: D dimer quantitative, US Extremity Upper Venous        rt            (I80.8) Superficial thrombophlebitis of right upper extremity  Comment: Patient still has 6 more pills of Xarelto he can continue taking those, no evidence for clinical evidence of extension of the thrombus and no signs or symptoms of DVT  Plan: D dimer quantitative, US Extremity Upper Venous        rt              Summary: Right upper extremity superficial thrombophlebitis secondary to rotator cuff surgery no clinical evidence of DVT will obtain Doppler venous ultrasound right upper extremity and d-dimer  Continue with the remaining pills of Xarelto  20 mg p.o. Daily    Follow-up on ultrasound and d-dimer results if negative no further studies are recommended    Nicholas Levin MD

## 2018-08-14 NOTE — MR AVS SNAPSHOT
After Visit Summary   8/14/2018    Willard Manzano    MRN: [id number]           Patient Information     Date Of Birth          1965        Visit Information        Provider Department      8/14/2018 9:30 AM Nicholas Levin MD [facility]      Today's Diagnoses     Tendinitis of right rotator cuff    -  1    Superficial thrombophlebitis of right upper extremity           Follow-ups after your visit        Your next 10 appointments already scheduled     Aug 14, 2018 10:30 AM CDT   LAB with RH LAB ONLY   [facility] ([facility])    [address] [facility] [address]   [phone]           Please do not eat 10-12 hours before your appointment if you are coming in fasting for labs on lipids, cholesterol, or glucose (sugar). This does not apply to pregnant women. Water, hot tea and black coffee (with nothing added) are okay. Do not drink other fluids, diet soda or chew gum.            Aug 27, 2018  3:00 PM CDT   YOKO Extremity with Kwadwo Gomes PT   [facility] ([facility])    [address]  [facility] [id number] [phone]            Sep 13, 2018  3:10 PM CDT   YOKO Extremity with Kwadwo Gomes PT   [facility] ([facility])    [address]  [facility] [id number] [phone]              Future tests that were ordered for you today     Open Future Orders        Priority Expected Expires Ordered    D dimer quantitative Routine 8/14/2018 12/14/2018 8/14/2018    US Extremity Upper Venous rt Routine 8/14/2018 12/14/2018 8/14/2018            Who to contact     If you have questions or need follow up information about today's clinic visit or your schedule please contact [facility] directly at [phone].  Normal or non-critical lab and  "imaging results will be communicated to you by MyChart, letter or phone within 4 business days after the clinic has received the results. If you do not hear from us within 7 days, please contact the clinic through [employer] or phone. If you have a critical or abnormal lab result, we will notify you by phone as soon as possible.  Submit refill requests through [employer] or call your pharmacy and they will forward the refill request to us. Please allow 3 business days for your refill to be completed.          Additional Information About Your Visit        Dinner [employer] Information     [employer] gives you secure access to your electronic health record. If you see a primary care provider, you can also send messages to your care team and make appointments. If you have questions, please call your primary care clinic.  If you do not have a primary care provider, please call [phone] and they will assist you.        Care EveryWhere ID     This is your Care EveryWhere ID. This could be used by other organizations to access your [facility] medical records  [id number]        Your Vitals Were     Pulse Respirations Height Pulse Oximetry BMI (Body Mass Index)       88 16 5' 10\" (1.778 m) 95% 29.41 kg/m2        Blood Pressure from Last 3 Encounters:   08/14/18 120/80   07/12/18 136/88   06/14/18 110/80    Weight from Last 3 Encounters:   08/14/18 205 lb (93 kg)   07/12/18 205 lb (93 kg)   06/14/18 207 lb (93.9 kg)               Primary Care Provider Office Phone # Fax #    Cassandra Villafana PA-C [phone] [address] [facility] [address]        Equal Access to Services     [facility]: Hadii aad ku hadasho Soomaali, waaxda luqadaha, qaybta kaalmada larissa perez. So [facility] [phone].    ATENCIÓN: Si habla español, tiene a gao disposición servicios gratuitos de asistencia lingüística. Mireya ellsworth [phone].    We comply with applicable federal civil rights laws and " [place] laws. We do not discriminate on the basis of race, color, national origin, age, disability, sex, sexual orientation, or gender identity.            Thank you!     Thank you for choosing [facility]  for your care. Our goal is always to provide you with excellent care. Hearing back from our patients is one way we can continue to improve our services. Please take a few minutes to complete the written survey that you may receive in the mail after your visit with us. Thank you!             Your Updated Medication List - Protect others around you: Learn how to safely use, store and throw away your medicines at www.disposemymeds.org.          This list is accurate as of 8/14/18 10:27 AM.  Always use your most recent med list.                   Brand Name Dispense Instructions for use Diagnosis    amoxicillin-clavulanate 875-125 MG per tablet    AUGMENTIN    20 tablet    Take 1 tablet by mouth 2 times daily    Acute non-recurrent maxillary sinusitis       fenofibrate 145 MG tablet     90 tablet    TAKE 1 TABLET (145 MG) BY MOUTH DAILY    Hyperlipidemia LDL goal <130       FISH OIL PO       Chest pain, SOB (shortness of breath), Decreased exercise tolerance, Hyperlipidemia LDL goal <130, Family history of coronary artery disease, Elevated fasting glucose       metFORMIN 500 MG 24 hr tablet    GLUCOPHAGE-XR    90 tablet    TAKE 1 TABLET (500 MG) BY MOUTH DAILY (WITH DINNER)    Elevated fasting glucose       omeprazole 40 MG capsule    priLOSEC    90 capsule    TAKE 1 CAPSULE (40 MG) BY MOUTH DAILY TAKE 30-60 MINUTES BEFORE A MEAL.    Gastroesophageal reflux disease, esophagitis presence not specified       SUMAtriptan 50 MG tablet    IMITREX    9 tablet    Take 1 tablet (50 mg) by mouth at onset of headache for migraine May repeat in 2 hours. Max 4 tablets/24 hours.    Migraine without aura and without status migrainosus, not intractable       XARELTO 15 MG Tabs tablet   Generic drug:   rivaroxaban ANTICOAGULANT      Take 1 tablet by mouth 2 times daily

## 2018-08-16 ENCOUNTER — HOSPITAL ENCOUNTER (OUTPATIENT)
Dept: ULTRASOUND IMAGING | Facility: CLINIC | Age: 53
Discharge: HOME | End: 2018-08-16
Attending: INTERNAL MEDICINE | Admitting: INTERNAL MEDICINE
Payer: COMMERCIAL

## 2018-08-16 DIAGNOSIS — M75.81 TENDINITIS OF RIGHT ROTATOR CUFF: ICD-10-CM

## 2018-08-16 DIAGNOSIS — I80.8 SUPERFICIAL THROMBOPHLEBITIS OF RIGHT UPPER EXTREMITY: ICD-10-CM

## 2018-08-16 PROCEDURE — 93971 EXTREMITY STUDY: CPT | Mod: RT

## 2018-08-26 DIAGNOSIS — E78.5 HYPERLIPIDEMIA LDL GOAL <130: ICD-10-CM

## 2018-08-27 ENCOUNTER — THERAPY VISIT (OUTPATIENT)
Dept: PHYSICAL THERAPY | Facility: CLINIC | Age: 53
End: 2018-08-27
Payer: COMMERCIAL

## 2018-08-27 DIAGNOSIS — Z98.890 STATUS POST SURGERY: ICD-10-CM

## 2018-08-27 DIAGNOSIS — M25.511 ACUTE PAIN OF RIGHT SHOULDER: ICD-10-CM

## 2018-08-27 PROCEDURE — 97110 THERAPEUTIC EXERCISES: CPT | Mod: GP | Performed by: PHYSICAL THERAPIST

## 2018-08-27 NOTE — TELEPHONE ENCOUNTER
Routing refill request to provider for review/approval because:  A break in medication  Joel Donnelly RN, BSN

## 2018-08-27 NOTE — TELEPHONE ENCOUNTER
"Requested Prescriptions   Pending Prescriptions Disp Refills     fenofibrate 145 MG tablet [Pharmacy Med Name: FENOFIBRATE 145 MG TABLET] 90 tablet 1    Last Written Prescription Date:  11/24/17  Last Fill Quantity: 90,  # refills: 1   Last Office Visit: 7/12/2018   Future Office Visit:      Sig: TAKE 1 TABLET (145 MG) BY MOUTH DAILY    Fibrates Passed    8/26/2018  4:05 AM       Passed - Lipid panel on file in past 12 months    Recent Labs   Lab Test  11/24/17   0810   08/20/14   1333   CHOL  213*   < >  203*   TRIG  138   < >  127   HDL  52   < >  43   LDL  133*   < >  135*   NHDL  161*   < >   --    VLDL   --    --   25   CHOLHDLRATIO   --    --   4.7    < > = values in this interval not displayed.              Passed - No abnormal creatine kinase in past 12 months    No lab results found.            Passed - Recent (12 mo) or future (30 days) visit within the authorizing provider's specialty    Patient had office visit in the last 12 months or has a visit in the next 30 days with authorizing provider or within the authorizing provider's specialty.  See \"Patient Info\" tab in inbasket, or \"Choose Columns\" in Meds & Orders section of the refill encounter.           Passed - Patient is age 18 or older          "

## 2018-08-28 RX ORDER — FENOFIBRATE 145 MG/1
TABLET, COATED ORAL
Qty: 90 TABLET | Refills: 1 | Status: SHIPPED | OUTPATIENT
Start: 2018-08-28 | End: 2018-12-26

## 2018-09-11 ENCOUNTER — TRANSFERRED RECORDS (OUTPATIENT)
Dept: HEALTH INFORMATION MANAGEMENT | Facility: CLINIC | Age: 53
End: 2018-09-11

## 2018-09-18 ENCOUNTER — THERAPY VISIT (OUTPATIENT)
Dept: PHYSICAL THERAPY | Facility: CLINIC | Age: 53
End: 2018-09-18
Payer: COMMERCIAL

## 2018-09-18 DIAGNOSIS — Z98.890 STATUS POST SURGERY: ICD-10-CM

## 2018-09-18 DIAGNOSIS — M25.511 ACUTE PAIN OF RIGHT SHOULDER: ICD-10-CM

## 2018-09-18 PROCEDURE — 97110 THERAPEUTIC EXERCISES: CPT | Mod: GP | Performed by: PHYSICAL THERAPIST

## 2018-09-18 PROCEDURE — 97112 NEUROMUSCULAR REEDUCATION: CPT | Mod: GP | Performed by: PHYSICAL THERAPIST

## 2018-10-02 ENCOUNTER — THERAPY VISIT (OUTPATIENT)
Dept: PHYSICAL THERAPY | Facility: CLINIC | Age: 53
End: 2018-10-02
Payer: COMMERCIAL

## 2018-10-02 DIAGNOSIS — M25.511 ACUTE PAIN OF RIGHT SHOULDER: ICD-10-CM

## 2018-10-02 DIAGNOSIS — Z98.890 STATUS POST SURGERY: ICD-10-CM

## 2018-10-02 PROCEDURE — 97110 THERAPEUTIC EXERCISES: CPT | Mod: GP | Performed by: PHYSICAL THERAPIST

## 2018-10-22 ENCOUNTER — TELEPHONE (OUTPATIENT)
Dept: OTHER | Facility: CLINIC | Age: 53
End: 2018-10-22

## 2018-10-22 DIAGNOSIS — I80.9 SUPERFICIAL THROMBOPHLEBITIS: Primary | ICD-10-CM

## 2018-10-22 NOTE — TELEPHONE ENCOUNTER
Patient needs to be scheduled for follow up OV to 8/14/18 appointment with Dr. Levin.   Patient had US completed on 8/16/18 and d-dimer completed on 8/15/18 to be discussed at f/u OV.    Routing to scheduling to contact patient to get scheduled for OV in [place] with Dr. Levin.    Brittni Montes BSN, RN

## 2018-10-22 NOTE — TELEPHONE ENCOUNTER
Left message on home number for patient to call back to schedule office visit appointment with Dr. Levin.    Maye Handley MA

## 2018-10-23 ENCOUNTER — THERAPY VISIT (OUTPATIENT)
Dept: PHYSICAL THERAPY | Facility: CLINIC | Age: 53
End: 2018-10-23
Payer: COMMERCIAL

## 2018-10-23 DIAGNOSIS — Z98.890 STATUS POST SURGERY: ICD-10-CM

## 2018-10-23 DIAGNOSIS — M25.511 ACUTE PAIN OF RIGHT SHOULDER: ICD-10-CM

## 2018-10-23 PROCEDURE — 97110 THERAPEUTIC EXERCISES: CPT | Mod: GP | Performed by: PHYSICAL THERAPIST

## 2018-10-23 NOTE — LETTER
[facility]  [address]  [facility] [address]  [phone]    2018    Re: Willard Manzano   :   1965  MRN:  [id number]   REFERRING PHYSICIAN:   Peng Cedeño    [facility]  Date of Initial Evaluation:  18  Visits:  Rxs Used: 10  Reason for Referral:  Acute pain of right shoulder; Status post surgery      DISCHARGE REPORT  Progress reporting period is from eval to 10/23/18.       SUBJECTIVE  Subjective changes noted by patient: Subjective: Pt reports continued progress in strength and function    Current pain level is  Current Pain level: 0/10.     Previous pain level was   Initial Pain level: 0/10.   Changes in function:  Yes (See Goal flowsheet attached for changes in current functional level)  Adverse reaction to treatment or activity: None    OBJECTIVE  Changes noted in objective findings:  Yes,   Objective: AROM WNL with ERT with combined ext/IR     ASSESSMENT/PLAN  Updated problem list and treatment plan: Diagnosis 1:  S/p R RCR  Decreased strength - therapeutic exercise and therapeutic activities  Impaired muscle performance - neuro re-education  STG/LTGs have been met or progress has been made towards goals:  Yes (See Goal flow sheet completed today.)  Assessment of Progress: The patient's condition is improving.  The patient's condition has potential to improve.  Self Management Plans:  Patient has been instructed in a home treatment program.  Patient is independent in a home treatment program.  Patient  has been instructed in self management of symptoms.  I have re-evaluated this patient and find that the nature, scope, duration and intensity of the therapy is appropriate for the medical condition of the patient.    Recommendations:  This patient is ready to be discharged from therapy and continue their home treatment program.    Thank you for your  referral.    INQUIRIES  Therapist: Kwadwo Gomes, PT  [facility]  [address] [address] [address]  Phone: [phone]/Fax: [phone]

## 2018-10-23 NOTE — MR AVS SNAPSHOT
After Visit Summary   10/23/2018    Willard Manzano    MRN: [id number]           Patient Information     Date Of Birth          1965        Visit Information        Provider Department      10/23/2018 3:00 PM Kwadwo Gomes, BRIAN [facility] Physical Therapy        Today's Diagnoses     Acute pain of right shoulder        Status post surgery           Follow-ups after your visit        Future tests that were ordered for you today     Open Future Orders        Priority Expected Expires Ordered    Follow-Up with Vascular Medicine Routine 10/22/2018 10/22/2019 10/22/2018            Who to contact     If you have questions or need follow up information about today's clinic visit or your schedule please contact [facility] directly at [phone].  Normal or non-critical lab and imaging results will be communicated to you by [employer], letter or phone within 4 business days after the clinic has received the results. If you do not hear from us within 7 days, please contact the clinic through [employer] or phone. If you have a critical or abnormal lab result, we will notify you by phone as soon as possible.  Submit refill requests through [employer] or call your pharmacy and they will forward the refill request to us. Please allow 3 business days for your refill to be completed.          Additional Information About Your Visit        MyChart Information     [employer] gives you secure access to your electronic health record. If you see a primary care provider, you can also send messages to your care team and make appointments. If you have questions, please call your primary care clinic.  If you do not have a primary care provider, please call [phone] and they will assist you.        Care EveryWhere ID     This is your Care EveryWhere ID. This could be used by other organizations to access your [facility]  records  [id number]         Blood Pressure from Last 3 Encounters:   08/14/18 120/80   07/12/18 136/88   06/14/18 110/80    Weight from Last 3 Encounters:   08/14/18 93 kg (205 lb)   07/12/18 93 kg (205 lb)   06/14/18 93.9 kg (207 lb)              We Performed the Following     YOKO PROGRESS NOTES REPORT     THERAPEUTIC EXERCISES        Primary Care Provider Office Phone # Fax #    Cassandra Villafana PA-C [phone] [address] [facility] [address]        Equal Access to Services     [facility]: Hadii aad ku hadasho Soomaali, waaxda luqadaha, qaybta kaalmada adeegyada, larissa parrish hayaan adelatonia sanchez . So [facility] [phone].    ATENCIÓN: Si habla español, tiene a gao disposición servicios gratuitos de asistencia lingüística. [facility] [phone].    We comply with applicable federal civil rights laws and [place] laws. We do not discriminate on the basis of race, color, national origin, age, disability, sex, sexual orientation, or gender identity.            Thank you!     Thank you for choosing [facility]  for your care. Our goal is always to provide you with excellent care. Hearing back from our patients is one way we can continue to improve our services. Please take a few minutes to complete the written survey that you may receive in the mail after your visit with us. Thank you!             Your Updated Medication List - Protect others around you: Learn how to safely use, store and throw away your medicines at www.disposemymeds.org.          This list is accurate as of 10/23/18  3:21 PM.  Always use your most recent med list.                   Brand Name Dispense Instructions for use Diagnosis    amoxicillin-clavulanate 875-125 MG per tablet    AUGMENTIN    20 tablet    Take 1 tablet by mouth 2 times daily    Acute non-recurrent maxillary sinusitis       fenofibrate 145 MG tablet     90 tablet    TAKE 1 TABLET (145 MG) BY  MOUTH DAILY    Hyperlipidemia LDL goal <130       FISH OIL PO       Chest pain, SOB (shortness of breath), Decreased exercise tolerance, Hyperlipidemia LDL goal <130, Family history of coronary artery disease, Elevated fasting glucose       metFORMIN 500 MG 24 hr tablet    GLUCOPHAGE-XR    90 tablet    TAKE 1 TABLET (500 MG) BY MOUTH DAILY (WITH DINNER)    Elevated fasting glucose       omeprazole 40 MG capsule    priLOSEC    90 capsule    TAKE 1 CAPSULE (40 MG) BY MOUTH DAILY TAKE 30-60 MINUTES BEFORE A MEAL.    Gastroesophageal reflux disease, esophagitis presence not specified       SUMAtriptan 50 MG tablet    IMITREX    9 tablet    Take 1 tablet (50 mg) by mouth at onset of headache for migraine May repeat in 2 hours. Max 4 tablets/24 hours.    Migraine without aura and without status migrainosus, not intractable       XARELTO 15 MG Tabs tablet   Generic drug:  rivaroxaban ANTICOAGULANT      Take 1 tablet by mouth 2 times daily

## 2018-10-23 NOTE — PROGRESS NOTES
Subjective:  HPI                    Objective:  System    Physical Exam    General     ROS    Assessment/Plan:    DISCHARGE REPORT    Progress reporting period is from eval to 10/23/18.       SUBJECTIVE  Subjective changes noted by patient:  .  Subjective: Pt reports continued progress in strength and function    Current pain level is  Current Pain level: 0/10.     Previous pain level was   Initial Pain level: 0/10.   Changes in function:  Yes (See Goal flowsheet attached for changes in current functional level)  Adverse reaction to treatment or activity: None    OBJECTIVE  Changes noted in objective findings:  Yes,   Objective: AROM WNL with ERT with combined ext/IR     ASSESSMENT/PLAN  Updated problem list and treatment plan: Diagnosis 1:  S/p R RCR  Decreased strength - therapeutic exercise and therapeutic activities  Impaired muscle performance - neuro re-education  STG/LTGs have been met or progress has been made towards goals:  Yes (See Goal flow sheet completed today.)  Assessment of Progress: The patient's condition is improving.  The patient's condition has potential to improve.  Self Management Plans:  Patient has been instructed in a home treatment program.  Patient is independent in a home treatment program.  Patient  has been instructed in self management of symptoms.  I have re-evaluated this patient and find that the nature, scope, duration and intensity of the therapy is appropriate for the medical condition of the patient.    Recommendations:  This patient is ready to be discharged from therapy and continue their home treatment program.    Please refer to the daily flowsheet for treatment today, total treatment time and time spent performing 1:1 timed codes.

## 2018-10-25 NOTE — TELEPHONE ENCOUNTER
Called and left a message for the patient to call us back to to schedule an appointment with Dr. Levin.

## 2018-11-06 ENCOUNTER — OFFICE VISIT (OUTPATIENT)
Dept: SURGERY | Facility: CLINIC | Age: 53
End: 2018-11-06
Payer: COMMERCIAL

## 2018-11-06 VITALS
WEIGHT: 200 LBS | HEIGHT: 70 IN | SYSTOLIC BLOOD PRESSURE: 120 MMHG | DIASTOLIC BLOOD PRESSURE: 78 MMHG | OXYGEN SATURATION: 98 % | HEART RATE: 72 BPM | BODY MASS INDEX: 28.63 KG/M2 | RESPIRATION RATE: 16 BRPM

## 2018-11-06 DIAGNOSIS — Z86.718 PERSONAL HISTORY OF DVT (DEEP VEIN THROMBOSIS): Primary | ICD-10-CM

## 2018-11-06 DIAGNOSIS — I82.A21 CHRONIC DEEP VEIN THROMBOSIS (DVT) OF AXILLARY VEIN OF RIGHT UPPER EXTREMITY (H): Chronic | ICD-10-CM

## 2018-11-06 DIAGNOSIS — I80.8 SUPERFICIAL THROMBOPHLEBITIS OF RIGHT UPPER EXTREMITY: ICD-10-CM

## 2018-11-06 DIAGNOSIS — I80.8 SUPERFICIAL THROMBOPHLEBITIS OF UPPER EXTREMITY, UNSPECIFIED LATERALITY: ICD-10-CM

## 2018-11-06 PROCEDURE — 99213 OFFICE O/P EST LOW 20 MIN: CPT | Performed by: INTERNAL MEDICINE

## 2018-11-06 NOTE — MR AVS SNAPSHOT
After Visit Summary   11/6/2018    Willard Manzano    MRN: [id number]           Patient Information     Date Of Birth          1965        Visit Information        Provider Department      11/6/2018 3:30 PM Nicholas Levin MD [facility]      Today's Diagnoses     Personal history of DVT (deep vein thrombosis)    -  1    Superficial thrombophlebitis of upper extremity, unspecified laterality        Chronic deep vein thrombosis (DVT) of axillary vein of right upper extremity (H)        Superficial thrombophlebitis of right upper extremity           Follow-ups after your visit        Future tests that were ordered for you today     Open Future Orders        Priority Expected Expires Ordered    US Upper Extremity Venous Duplex Right Routine 11/6/2018 11/6/2019 11/6/2018            Who to contact     If you have questions or need follow up information about today's clinic visit or your schedule please contact [facility] directly at [phone].  Normal or non-critical lab and imaging results will be communicated to you by [employer], letter or phone within 4 business days after the clinic has received the results. If you do not hear from us within 7 days, please contact the clinic through [employer] or phone. If you have a critical or abnormal lab result, we will notify you by phone as soon as possible.  Submit refill requests through [employer] or call your pharmacy and they will forward the refill request to us. Please allow 3 business days for your refill to be completed.          Additional Information About Your Visit        [employer] Information     [employer] gives you secure access to your electronic health record. If you see a primary care provider, you can also send messages to your care team and make appointments. If you have questions, please call your primary care clinic.  If you do not have a primary care provider,  "please call [phone] and they will assist you.        Care EveryWhere ID     This is your Care EveryWhere ID. This could be used by other organizations to access your [facility] medical records  [id number]        Your Vitals Were     Pulse Respirations Height Pulse Oximetry BMI (Body Mass Index)       72 16 5' 10\" (1.778 m) 98% 28.7 kg/m2        Blood Pressure from Last 3 Encounters:   11/06/18 120/78   08/14/18 120/80   07/12/18 136/88    Weight from Last 3 Encounters:   11/06/18 200 lb (90.7 kg)   08/14/18 205 lb (93 kg)   07/12/18 205 lb (93 kg)              We Performed the Following     Follow-Up with Vascular Medicine      Extremity Upper Venous rt        Primary Care Provider Office Phone # Fax #    Cassandra Villafana PA-C [phone] [address] [facility] [address]        Equal Access to Services     CHUCKIE DIAZ : Hadii gold ku hadasho Soomaali, waaxda luqadaha, qaybta kaalmada adeegyada, waxay shivani sanchez . So [facility] [phone].    ATENCIÓN: Si ravinderla español, tiene a gao disposición servicios gratuitos de asistencia lingüística. Llame al [phone].    We comply with applicable federal civil rights laws and [place] laws. We do not discriminate on the basis of race, color, national origin, age, disability, sex, sexual orientation, or gender identity.            Thank you!     Thank you for choosing [facility]  for your care. Our goal is always to provide you with excellent care. Hearing back from our patients is one way we can continue to improve our services. Please take a few minutes to complete the written survey that you may receive in the mail after your visit with us. Thank you!             Your Updated Medication List - Protect others around you: Learn how to safely use, store and throw away your medicines at www.disposemymeds.org.          This list is accurate as of 11/6/18  4:03 PM.  Always use your most recent med list. "                   Brand Name Dispense Instructions for use Diagnosis    amoxicillin-clavulanate 875-125 MG per tablet    AUGMENTIN    20 tablet    Take 1 tablet by mouth 2 times daily    Acute non-recurrent maxillary sinusitis       fenofibrate 145 MG tablet     90 tablet    TAKE 1 TABLET (145 MG) BY MOUTH DAILY    Hyperlipidemia LDL goal <130       FISH OIL PO       Chest pain, SOB (shortness of breath), Decreased exercise tolerance, Hyperlipidemia LDL goal <130, Family history of coronary artery disease, Elevated fasting glucose       metFORMIN 500 MG 24 hr tablet    GLUCOPHAGE-XR    90 tablet    TAKE 1 TABLET (500 MG) BY MOUTH DAILY (WITH DINNER)    Elevated fasting glucose       omeprazole 40 MG capsule    priLOSEC    90 capsule    TAKE 1 CAPSULE (40 MG) BY MOUTH DAILY TAKE 30-60 MINUTES BEFORE A MEAL.    Gastroesophageal reflux disease, esophagitis presence not specified       SUMAtriptan 50 MG tablet    IMITREX    9 tablet    Take 1 tablet (50 mg) by mouth at onset of headache for migraine May repeat in 2 hours. Max 4 tablets/24 hours.    Migraine without aura and without status migrainosus, not intractable       XARELTO 15 MG Tabs tablet   Generic drug:  rivaroxaban ANTICOAGULANT      Take 1 tablet by mouth 2 times daily

## 2018-11-06 NOTE — PROGRESS NOTES
Vascular Medicine Progress Note     Willard Manzano is a 53 year old male who was is here for follow-up on superficial thrombophlebitis with no extension into the deep system status post right shoulder surgery    Interval History   Patient tenderness and swelling is much better patient d-dimer was 0.3 ultrasound will be scheduled to see if there is any evidence of extension into the deep system    Physical Exam       BP: 120/78 Pulse: 72   Resp: 16 SpO2: 98 %      Vitals:    11/06/18 1519   Weight: 200 lb (90.7 kg)     Vital Signs with Ranges  Pulse:  [72] 72  Resp:  [16] 16  BP: (120)/(78) 120/78  SpO2:  [98 %] 98 %  [unfilled]    Constitutional: awake, alert, cooperative, no apparent distress, and appears stated age  Eyes: Lids and lashes normal, pupils equal, round and reactive to light, extra ocular muscles intact, sclera clear, conjunctiva normal  ENT: normocepalic, without obvious abnormality, oropharynx pink and moist  Hematologic / Lymphatic: no lymphadenopathy  Respiratory: No increased work of breathing, good air exchange, clear to auscultation bilaterally, no crackles or wheezing  Cardiovascular: regular rate and rhythm, normal S1 and S2 and no murmur noted  GI: Normal bowel sounds, soft, non-distended, non-tender  Skin: no redness, warmth, or swelling, no rashes and no lesions  Musculoskeletal: There is no redness, warmth, or swelling of the joints.  Full range of motion noted.  Motor strength is 5 out of 5 all extremities bilaterally.  Tone is normal.  Neurologic: Awake, alert, oriented to name, place and time.  Cranial nerves II-XII are grossly intact.  Motor is 5 out of 5 bilaterally.    Neuropsychiatric:  Normal affect, memory, insight.  Pulses: Intact  . No carotid bruits appreciated.     Medications         Data   No results found for this or any previous visit (from the past 24 hour(s)).    Assessment & Plan   (I82.A21) Chronic deep vein thrombosis (DVT) of axillary vein of right upper  extremity (H)  (primary encounter diagnosis)    Plan: US Upper Extremity Venous Duplex Right            (I80.9) Superficial thrombophlebitis  Resolved          Summary: Awaiting Doppler ultrasound results will report the results by phone    Nicholas Levin MD

## 2018-11-12 ENCOUNTER — HOSPITAL ENCOUNTER (OUTPATIENT)
Dept: ULTRASOUND IMAGING | Facility: CLINIC | Age: 53
Discharge: HOME | End: 2018-11-12
Attending: INTERNAL MEDICINE | Admitting: INTERNAL MEDICINE
Payer: COMMERCIAL

## 2018-11-12 DIAGNOSIS — I82.A21 CHRONIC DEEP VEIN THROMBOSIS (DVT) OF AXILLARY VEIN OF RIGHT UPPER EXTREMITY (H): Chronic | ICD-10-CM

## 2018-11-12 PROCEDURE — 93971 EXTREMITY STUDY: CPT | Mod: RT

## 2018-11-13 ENCOUNTER — TRANSFERRED RECORDS (OUTPATIENT)
Dept: HEALTH INFORMATION MANAGEMENT | Facility: CLINIC | Age: 53
End: 2018-11-13

## 2018-12-26 ENCOUNTER — OFFICE VISIT (OUTPATIENT)
Dept: FAMILY MEDICINE | Facility: CLINIC | Age: 53
End: 2018-12-26
Payer: COMMERCIAL

## 2018-12-26 VITALS
HEART RATE: 87 BPM | SYSTOLIC BLOOD PRESSURE: 126 MMHG | WEIGHT: 209 LBS | RESPIRATION RATE: 16 BRPM | DIASTOLIC BLOOD PRESSURE: 87 MMHG | BODY MASS INDEX: 29.99 KG/M2 | TEMPERATURE: 97.7 F

## 2018-12-26 DIAGNOSIS — R73.01 ELEVATED FASTING GLUCOSE: ICD-10-CM

## 2018-12-26 DIAGNOSIS — Z23 NEED FOR PROPHYLACTIC VACCINATION AND INOCULATION AGAINST INFLUENZA: ICD-10-CM

## 2018-12-26 DIAGNOSIS — J01.00 ACUTE NON-RECURRENT MAXILLARY SINUSITIS: ICD-10-CM

## 2018-12-26 DIAGNOSIS — E78.5 HYPERLIPIDEMIA LDL GOAL <160: Primary | ICD-10-CM

## 2018-12-26 DIAGNOSIS — E78.5 HYPERLIPIDEMIA LDL GOAL <130: ICD-10-CM

## 2018-12-26 DIAGNOSIS — Z86.718 PERSONAL HISTORY OF DVT (DEEP VEIN THROMBOSIS): ICD-10-CM

## 2018-12-26 LAB
CHOLEST SERPL-MCNC: 212 MG/DL
HBA1C MFR BLD: 6.3 % (ref 0–5.6)
HDLC SERPL-MCNC: 38 MG/DL
LDLC SERPL CALC-MCNC: 141 MG/DL
NONHDLC SERPL-MCNC: 174 MG/DL
TRIGL SERPL-MCNC: 164 MG/DL

## 2018-12-26 PROCEDURE — 99214 OFFICE O/P EST MOD 30 MIN: CPT | Mod: 25 | Performed by: PHYSICIAN ASSISTANT

## 2018-12-26 PROCEDURE — 90471 IMMUNIZATION ADMIN: CPT | Performed by: PHYSICIAN ASSISTANT

## 2018-12-26 PROCEDURE — 83036 HEMOGLOBIN GLYCOSYLATED A1C: CPT | Performed by: PHYSICIAN ASSISTANT

## 2018-12-26 PROCEDURE — 36415 COLL VENOUS BLD VENIPUNCTURE: CPT | Performed by: PHYSICIAN ASSISTANT

## 2018-12-26 PROCEDURE — 90682 RIV4 VACC RECOMBINANT DNA IM: CPT | Performed by: PHYSICIAN ASSISTANT

## 2018-12-26 PROCEDURE — 80061 LIPID PANEL: CPT | Performed by: PHYSICIAN ASSISTANT

## 2018-12-26 RX ORDER — METFORMIN HCL 500 MG
TABLET, EXTENDED RELEASE 24 HR ORAL
Qty: 90 TABLET | Refills: 3 | Status: SHIPPED | OUTPATIENT
Start: 2018-12-26 | End: 2019-11-13

## 2018-12-26 RX ORDER — FENOFIBRATE 145 MG/1
TABLET, COATED ORAL
Qty: 90 TABLET | Refills: 3 | Status: SHIPPED | OUTPATIENT
Start: 2018-12-26 | End: 2019-11-13

## 2018-12-26 NOTE — PROGRESS NOTES
SUBJECTIVE:   Willard Manzano is a 53 year old male who presents to clinic today for the following health issues:      Pre-Diabetes Follow-up      Patient is checking blood sugars: not at all    Diabetic concerns: None     Symptoms of hypoglycemia (low blood sugar): shaky-if he doesn't eat     Paresthesias (numbness or burning in feet) or sores: No     Date of last diabetic eye exam: 6/2018-[facility] in [place]    BP Readings from Last 2 Encounters:   12/26/18 126/87   11/06/18 120/78     Hemoglobin A1C (%)   Date Value   06/14/2018 6.1 (H)   11/24/2017 6.0     LDL Cholesterol Calculated (mg/dL)   Date Value   11/24/2017 133 (H)   03/25/2016 130 (H)       Diabetes Management Resources  Hyperlipidemia Follow-Up      Rate your low fat/cholesterol diet?: fair    Taking statin?  No    Other lipid medications/supplements?:  Fish oil/Omega 3      Amount of exercise or physical activity: None    Problems taking medications regularly: No    Medication side effects: none    Diet: regular (no restrictions)    Had DVT after surgery. Was advised by Ortho to see hematology.      Acute Illness   Acute illness concerns: sinus, has recurrently   Onset: 3 weeks.     Fever: no    Chills/Sweats: no    Headache (location?): no    Sinus Pressure:YES- tender and facial pain    Conjunctivitis:  no    Ear Pain: no    Rhinorrhea: YES    Congestion: YES    Sore Throat: no     Cough: no    Wheeze: no    Decreased Appetite: no    Nausea: no    Vomiting: no    Diarrhea:  no    Dysuria/Freq.: no    Fatigue/Achiness: no    Sick/Strep Exposure: no     Therapies Tried and outcome:       Problem list and histories reviewed & adjusted, as indicated.  Additional history: as documented    Patient Active Problem List   Diagnosis     Chiari malformation type I (H)     Hyperlipidemia LDL goal <160     Family history of colon cancer     Rotator cuff tendinitis     Elevated fasting glucose     Rotator cuff tear     Family history of coronary artery  disease     Esophageal reflux     Benign non-nodular prostatic hyperplasia with lower urinary tract symptoms     Personal history of DVT (deep vein thrombosis)     Past Surgical History:   Procedure Laterality Date     COLONOSCOPY  12/29/10    non-adenomatous polyp. Advised 5 yr f/u.      HC COLONOSCOPY THRU STOMA, DIAGNOSTIC  08    normal. 3 yr f/u due to brother CA age 43.     HC REMOVAL OF TONSILS,<13 Y/O       HC VASECTOMY UNILAT/BILAT W POSTOP SEMEN       HERNIA REPAIR       SURGICAL HISTORY OF -   2006    umbilical hernia repair with mesh       Social History     Tobacco Use     Smoking status: Never Smoker     Smokeless tobacco: Never Used   Substance Use Topics     Alcohol use: Yes     Alcohol/week: 0.0 oz     Comment: 3 drinks per week     Family History   Problem Relation Age of Onset     Diabetes Mother         dx in her 40's. overwt. Now IDDM.     Hypertension Mother      Lipids Mother      Gallbladder Disease Mother      Cancer Father          lung CA with brain mets age 65     Other Cancer Father      Substance Abuse Father      Cancer - colorectal Brother         Gideon.  Dx age 45      Cardiovascular Brother         Loi. Brother MI at age 49. Had another stent at age 52     Colon Cancer Brother      Other Cancer Brother      Breast Cancer Sister         Latricia. Dx bilateral breast CA at age 56.      Other Cancer Sister      Family History Negative Sister         Stacey.     Family History Negative Sister         Tom.      Family History Negative Sister         Mayra.      Allergies Son      Substance Abuse Son      Family History Negative Son      Family History Negative Daughter            Reviewed and updated as needed this visit by clinical staff  Tobacco  Allergies  Meds  Med Hx  Surg Hx  Fam Hx  Soc Hx      Reviewed and updated as needed this visit by Provider         ROS:  Constitutional, HEENT, cardiovascular, pulmonary, gi and gu systems are negative, except as otherwise  noted.    OBJECTIVE:     /87 (BP Location: Right arm, Patient Position: Chair, Cuff Size: Adult Large)   Pulse 87   Temp 97.7  F (36.5  C) (Oral)   Resp 16   Wt 94.8 kg (209 lb)   BMI 29.99 kg/m    Body mass index is 29.99 kg/m .  GENERAL APPEARANCE: healthy, alert and no distress  HENT: ear canals and TM's normal, nasal mucosa edematous without rhinorrhea, oral mucous membranes moist, oropharynx clear and maxillary sinus tenderness bilateral  RESP: lungs clear to auscultation - no rales, rhonchi or wheezes  CV: regular rates and rhythm, normal S1 S2, no S3 or S4 and no murmur, click or rub        ASSESSMENT/PLAN:             1. Hyperlipidemia LDL goal <160    - Lipid panel reflex to direct LDL Fasting    2. Elevated fasting glucose  Await labs.   - Hemoglobin A1c  - metFORMIN (GLUCOPHAGE-XR) 500 MG 24 hr tablet; TAKE 1 TABLET (500 MG) BY MOUTH DAILY (WITH DINNER)  Dispense: 90 tablet; Refill: 3    3. Personal history of DVT (deep vein thrombosis)  Referral placed.   - ONC/HEME ADULT REFERRAL    4. Acute non-recurrent maxillary sinusitis  Nasal saline spray.  Would like referral for sinus consult with ENT>   - amoxicillin-clavulanate (AUGMENTIN) 875-125 MG tablet; Take 1 tablet by mouth 2 times daily for 10 days  Dispense: 20 tablet; Refill: 0  - OTOLARYNGOLOGY REFERRAL    5. Need for prophylactic vaccination and inoculation against influenza    - FLU VACCINE, (RIV4) RECOMBINANT HA  , IM (FluBlok, egg free) [33338]- >18 YRS (FMG recommended  50-64 YRS)    6. Hyperlipidemia LDL goal <130    - fenofibrate (TRICOR) 145 MG tablet; TAKE 1 TABLET (145 MG) BY MOUTH DAILY  Dispense: 90 tablet; Refill: 3        Cassandra Villafana PA-C  [facility]  Injectable Influenza Immunization Documentation    1.  Is the person to be vaccinated sick today?   No    2. Does the person to be vaccinated have an allergy to a component   of the vaccine?   No  Egg Allergy Algorithm Link    3. Has the person to be  vaccinated ever had a serious reaction   to influenza vaccine in the past?   No    4. Has the person to be vaccinated ever had Guillain-Barré syndrome?   No    Form completed by Freddie Monsalve MA

## 2018-12-31 ENCOUNTER — TRANSFERRED RECORDS (OUTPATIENT)
Dept: HEALTH INFORMATION MANAGEMENT | Facility: CLINIC | Age: 53
End: 2018-12-31

## 2019-01-10 ENCOUNTER — TRANSFERRED RECORDS (OUTPATIENT)
Dept: HEALTH INFORMATION MANAGEMENT | Facility: CLINIC | Age: 54
End: 2019-01-10

## 2019-02-07 DIAGNOSIS — K21.9 GASTROESOPHAGEAL REFLUX DISEASE, ESOPHAGITIS PRESENCE NOT SPECIFIED: ICD-10-CM

## 2019-02-07 RX ORDER — OMEPRAZOLE 40 MG/1
CAPSULE, DELAYED RELEASE ORAL
Qty: 90 CAPSULE | Refills: 2 | Status: SHIPPED | OUTPATIENT
Start: 2019-02-07 | End: 2019-11-12

## 2019-02-07 NOTE — TELEPHONE ENCOUNTER
"Requested Prescriptions   Pending Prescriptions Disp Refills     omeprazole (PRILOSEC) 40 MG DR capsule [Pharmacy Med Name: OMEPRAZOLE DR 40 MG CAPSULE]  Last Written Prescription Date:  7/27/2018  Last Fill Quantity: 90 capsule,  # refills: 1   Last office visit: 12/26/2018 with prescribing provider:  Broderick   Future Office Visit:     90 capsule 1     Sig: TAKE 1 CAPSULE (40 MG) BY MOUTH DAILY TAKE 30-60 MINUTES BEFORE A MEAL.    PPI Protocol Passed - 2/7/2019  2:13 AM       Passed - Not on Clopidogrel (unless Pantoprazole ordered)       Passed - No diagnosis of osteoporosis on record       Passed - Recent (12 mo) or future (30 days) visit within the authorizing provider's specialty    Patient had office visit in the last 12 months or has a visit in the next 30 days with authorizing provider or within the authorizing provider's specialty.  See \"Patient Info\" tab in inbasket, or \"Choose Columns\" in Meds & Orders section of the refill encounter.             Passed - Medication is active on med list       Passed - Patient is age 18 or older          "

## 2019-02-07 NOTE — TELEPHONE ENCOUNTER
Prescription approved per [facility] Refill Protocol.  Mathieu Johnson RN

## 2019-05-07 ENCOUNTER — TRANSFERRED RECORDS (OUTPATIENT)
Dept: HEALTH INFORMATION MANAGEMENT | Facility: CLINIC | Age: 54
End: 2019-05-07

## 2019-10-01 ENCOUNTER — HEALTH MAINTENANCE LETTER (OUTPATIENT)
Age: 54
End: 2019-10-01

## 2019-11-13 ENCOUNTER — OFFICE VISIT (OUTPATIENT)
Dept: FAMILY MEDICINE | Facility: CLINIC | Age: 54
End: 2019-11-13
Payer: COMMERCIAL

## 2019-11-13 VITALS
BODY MASS INDEX: 27.84 KG/M2 | OXYGEN SATURATION: 96 % | DIASTOLIC BLOOD PRESSURE: 82 MMHG | WEIGHT: 194 LBS | SYSTOLIC BLOOD PRESSURE: 120 MMHG | TEMPERATURE: 97.5 F | HEART RATE: 70 BPM

## 2019-11-13 DIAGNOSIS — E78.5 HYPERLIPIDEMIA LDL GOAL <130: ICD-10-CM

## 2019-11-13 DIAGNOSIS — Z00.00 ROUTINE GENERAL MEDICAL EXAMINATION AT A HEALTH CARE FACILITY: Primary | ICD-10-CM

## 2019-11-13 DIAGNOSIS — R73.01 ELEVATED FASTING GLUCOSE: ICD-10-CM

## 2019-11-13 DIAGNOSIS — K21.9 GASTROESOPHAGEAL REFLUX DISEASE, ESOPHAGITIS PRESENCE NOT SPECIFIED: ICD-10-CM

## 2019-11-13 LAB
ERYTHROCYTE [DISTWIDTH] IN BLOOD BY AUTOMATED COUNT: 13.7 % (ref 10–15)
HBA1C MFR BLD: 6.1 % (ref 0–5.6)
HCT VFR BLD AUTO: 49.2 % (ref 40–53)
HGB BLD-MCNC: 16.5 G/DL (ref 13.3–17.7)
MCH RBC QN AUTO: 29.3 PG (ref 26.5–33)
MCHC RBC AUTO-ENTMCNC: 33.5 G/DL (ref 31.5–36.5)
MCV RBC AUTO: 87 FL (ref 78–100)
PLATELET # BLD AUTO: 312 10E9/L (ref 150–450)
RBC # BLD AUTO: 5.64 10E12/L (ref 4.4–5.9)
WBC # BLD AUTO: 4.6 10E9/L (ref 4–11)

## 2019-11-13 PROCEDURE — 80061 LIPID PANEL: CPT | Performed by: PHYSICIAN ASSISTANT

## 2019-11-13 PROCEDURE — 90471 IMMUNIZATION ADMIN: CPT | Performed by: PHYSICIAN ASSISTANT

## 2019-11-13 PROCEDURE — 36415 COLL VENOUS BLD VENIPUNCTURE: CPT | Performed by: PHYSICIAN ASSISTANT

## 2019-11-13 PROCEDURE — 99396 PREV VISIT EST AGE 40-64: CPT | Mod: 25 | Performed by: PHYSICIAN ASSISTANT

## 2019-11-13 PROCEDURE — 85027 COMPLETE CBC AUTOMATED: CPT | Performed by: PHYSICIAN ASSISTANT

## 2019-11-13 PROCEDURE — 83036 HEMOGLOBIN GLYCOSYLATED A1C: CPT | Performed by: PHYSICIAN ASSISTANT

## 2019-11-13 PROCEDURE — 80053 COMPREHEN METABOLIC PANEL: CPT | Performed by: PHYSICIAN ASSISTANT

## 2019-11-13 PROCEDURE — G0103 PSA SCREENING: HCPCS | Performed by: PHYSICIAN ASSISTANT

## 2019-11-13 PROCEDURE — 90682 RIV4 VACC RECOMBINANT DNA IM: CPT | Performed by: PHYSICIAN ASSISTANT

## 2019-11-13 RX ORDER — ASPIRIN 81 MG/1
81 TABLET ORAL DAILY
COMMUNITY

## 2019-11-13 RX ORDER — FENOFIBRATE 145 MG/1
TABLET, COATED ORAL
Qty: 90 TABLET | Refills: 3 | Status: SHIPPED | OUTPATIENT
Start: 2019-11-13 | End: 2021-01-26

## 2019-11-13 RX ORDER — METFORMIN HCL 500 MG
TABLET, EXTENDED RELEASE 24 HR ORAL
Qty: 90 TABLET | Refills: 3 | Status: SHIPPED | OUTPATIENT
Start: 2019-11-13 | End: 2020-12-01

## 2019-11-13 RX ORDER — OMEPRAZOLE 40 MG/1
CAPSULE, DELAYED RELEASE ORAL
Qty: 90 CAPSULE | Refills: 3 | Status: SHIPPED | OUTPATIENT
Start: 2019-11-13 | End: 2020-11-18

## 2019-11-13 ASSESSMENT — ENCOUNTER SYMPTOMS
FREQUENCY: 1
ARTHRALGIAS: 0
NERVOUS/ANXIOUS: 0
EYE PAIN: 0
HEADACHES: 0
WEAKNESS: 0
PALPITATIONS: 0
DIARRHEA: 0
HEARTBURN: 0
DYSURIA: 0
CHILLS: 0
DIZZINESS: 0
ABDOMINAL PAIN: 0
SORE THROAT: 0
PARESTHESIAS: 0
NAUSEA: 0
MYALGIAS: 0
JOINT SWELLING: 0
COUGH: 0
FEVER: 0
CONSTIPATION: 0
HEMATURIA: 0
SHORTNESS OF BREATH: 0
HEMATOCHEZIA: 0

## 2019-11-13 ASSESSMENT — PATIENT HEALTH QUESTIONNAIRE - PHQ9
SUM OF ALL RESPONSES TO PHQ QUESTIONS 1-9: 0
SUM OF ALL RESPONSES TO PHQ QUESTIONS 1-9: 0
5. POOR APPETITE OR OVEREATING: NOT AT ALL
10. IF YOU CHECKED OFF ANY PROBLEMS, HOW DIFFICULT HAVE THESE PROBLEMS MADE IT FOR YOU TO DO YOUR WORK, TAKE CARE OF THINGS AT HOME, OR GET ALONG WITH OTHER PEOPLE: NOT DIFFICULT AT ALL

## 2019-11-13 ASSESSMENT — ANXIETY QUESTIONNAIRES
2. NOT BEING ABLE TO STOP OR CONTROL WORRYING: NOT AT ALL
5. BEING SO RESTLESS THAT IT IS HARD TO SIT STILL: NOT AT ALL
7. FEELING AFRAID AS IF SOMETHING AWFUL MIGHT HAPPEN: NOT AT ALL
1. FEELING NERVOUS, ANXIOUS, OR ON EDGE: NOT AT ALL
6. BECOMING EASILY ANNOYED OR IRRITABLE: NOT AT ALL
3. WORRYING TOO MUCH ABOUT DIFFERENT THINGS: NOT AT ALL
GAD7 TOTAL SCORE: 0
IF YOU CHECKED OFF ANY PROBLEMS ON THIS QUESTIONNAIRE, HOW DIFFICULT HAVE THESE PROBLEMS MADE IT FOR YOU TO DO YOUR WORK, TAKE CARE OF THINGS AT HOME, OR GET ALONG WITH OTHER PEOPLE: NOT DIFFICULT AT ALL

## 2019-11-13 NOTE — PROGRESS NOTES
SUBJECTIVE:   CC: Willard Manzano is an 54 year old male who presents for preventative health visit.     Healthy Habits:     Getting at least 3 servings of Calcium per day:  Yes    Bi-annual eye exam:  Yes    Dental care twice a year:  Yes    Sleep apnea or symptoms of sleep apnea:  None    Diet:  Regular (no restrictions)    Frequency of exercise:  1 day/week    Duration of exercise:  Less than 15 minutes    Taking medications regularly:  Yes    Medication side effects:  None    PHQ-2 Total Score: 0    Additional concerns today:  No          Today's PHQ-2 Score:   PHQ-2 ( 1999 Pfizer) 6/14/2018   Q1: Little interest or pleasure in doing things 0   Q2: Feeling down, depressed or hopeless 0   PHQ-2 Score 0       Abuse: Current or Past(Physical, Sexual or Emotional)- No  Do you feel safe in your environment? Yes    Have you ever done Advance Care Planning? (For example, a Health Directive, POLST, or a discussion with a medical provider or your loved ones about your wishes):     Social History     Tobacco Use     Smoking status: Never Smoker     Smokeless tobacco: Never Used   Substance Use Topics     Alcohol use: Yes     Alcohol/week: 0.0 standard drinks     Comment: 3 drinks per week     If you drink alcohol do you typically have >3 drinks per day or >7 drinks per week? No    Alcohol Use 8/20/2014   Prescreen: >3 drinks/day or >7 drinks/week? The patient does not drink >3 drinks per day nor >7 drinks per week.       Last PSA:   PSA   Date Value Ref Range Status   12/30/2015 0.80 0 - 4 ug/L Final       Reviewed orders with patient. Reviewed health maintenance and updated orders accordingly - Yes  Lab work is in process    Reviewed and updated as needed this visit by clinical staff         Reviewed and updated as needed this visit by Provider        Past Medical History:   Diagnosis Date     Blood clotting disorder (H)      Cancer (H)     Skin     Chiari malformation type I (H)      Elevated fasting glucose 1/19/2012      Headaches, migraine      Headaches, migraine      Other and unspecified hyperlipidemia      Rotator cuff tear 1/22/2012     Rotator cuff tear 1/22/2012     Rotator cuff tendinitis 1/2011    Inj. Dr. Cheney.      Spinal headache 2007    spontaneous spinal fluid leak - 2 blood patches     Urethral polyp 6-24-10    Dr. Card     Urethral polyp 6/24/2010    Dr. Card         Review of Systems   Constitutional: Negative for chills and fever.   HENT: Positive for congestion. Negative for ear pain, hearing loss and sore throat.    Eyes: Negative for pain and visual disturbance.   Respiratory: Negative for cough and shortness of breath.    Cardiovascular: Negative for chest pain, palpitations and peripheral edema.   Gastrointestinal: Negative for abdominal pain, constipation, diarrhea, heartburn, hematochezia and nausea.   Genitourinary: Positive for frequency. Negative for discharge, dysuria, genital sores, hematuria, impotence and urgency.   Musculoskeletal: Negative for arthralgias, joint swelling and myalgias.   Skin: Negative for rash.   Neurological: Negative for dizziness, weakness, headaches and paresthesias.   Psychiatric/Behavioral: Negative for mood changes. The patient is not nervous/anxious.      CONSTITUTIONAL: NEGATIVE for fever, chills, change in weight  INTEGUMENTARY/SKIN: NEGATIVE for worrisome rashes, moles or lesions  EYES: NEGATIVE for vision changes or irritation  ENT: NEGATIVE for ear, mouth and throat problems  RESP: NEGATIVE for significant cough or SOB  CV: NEGATIVE for chest pain, palpitations or peripheral edema  GI: NEGATIVE for nausea, abdominal pain, heartburn, or change in bowel habits   male: negative for dysuria, hematuria, decreased urinary stream, erectile dysfunction, urethral discharge  MUSCULOSKELETAL: NEGATIVE for significant arthralgias or myalgia  NEURO: NEGATIVE for weakness, dizziness or paresthesias  PSYCHIATRIC: NEGATIVE for changes in mood or affect    OBJECTIVE:   There  were no vitals taken for this visit.    Physical Exam  GENERAL: healthy, alert and no distress  EYES: Eyes grossly normal to inspection, PERRL and conjunctivae and sclerae normal  HENT: ear canals and TM's normal, nose and mouth without ulcers or lesions  NECK: no adenopathy, no asymmetry, masses, or scars and thyroid normal to palpation  RESP: lungs clear to auscultation - no rales, rhonchi or wheezes  CV: regular rate and rhythm, normal S1 S2, no S3 or S4, no murmur, click or rub, no peripheral edema and peripheral pulses strong  ABDOMEN: soft, nontender, no hepatosplenomegaly, no masses and bowel sounds normal  MS: no gross musculoskeletal defects noted, no edema  SKIN: no suspicious lesions or rashes  NEURO: Normal strength and tone, mentation intact and speech normal  PSYCH: mentation appears normal, affect normal/bright        ASSESSMENT/PLAN:   1. Routine general medical examination at a health care facility    - CBC with platelets  - Comprehensive metabolic panel  - Lipid panel reflex to direct LDL Fasting  - Hemoglobin A1c  - PSA, screen    2. Hyperlipidemia LDL goal <130    - fenofibrate (TRICOR) 145 MG tablet; TAKE 1 TABLET (145 MG) BY MOUTH DAILY  Dispense: 90 tablet; Refill: 3  - Lipid panel reflex to direct LDL Fasting    3. Elevated fasting glucose    - metFORMIN (GLUCOPHAGE-XR) 500 MG 24 hr tablet; TAKE 1 TABLET (500 MG) BY MOUTH DAILY (WITH DINNER)  Dispense: 90 tablet; Refill: 3  - Hemoglobin A1c    4. Gastroesophageal reflux disease, esophagitis presence not specified    - omeprazole (PRILOSEC) 40 MG DR capsule; TAKE 1 CAPSULE (40 MG) BY MOUTH DAILY TAKE 30-60 MINUTES BEFORE A MEAL.  Dispense: 90 capsule; Refill: 3    COUNSELING:   Reviewed preventive health counseling, as reflected in patient instructions       Regular exercise       Healthy diet/nutrition       Vision screening       Hearing screening       Immunizations    Vaccinated for: Influenza          Estimated body mass index is 29.99  "kg/m  as calculated from the following:    Height as of 11/6/18: 1.778 m (5' 10\").    Weight as of 12/26/18: 94.8 kg (209 lb).     Weight management plan: Discussed healthy diet and exercise guidelines     reports that he has never smoked. He has never used smokeless tobacco.      Counseling Resources:  ATP IV Guidelines  Pooled Cohorts Equation Calculator  FRAX Risk Assessment  ICSI Preventive Guidelines  Dietary Guidelines for Americans, 2010  USDA's MyPlate  ASA Prophylaxis  Lung CA Screening    Cassandra Villafana PA-C  [facility]  Answers for HPI/ROS submitted by the patient on 11/13/2019   Annual Exam:  If you checked off any problems, how difficult have these problems made it for you to do your work, take care of things at home, or get along with other people?: Not difficult at all  PHQ9 TOTAL SCORE: 0    "

## 2019-11-14 LAB
ALBUMIN SERPL-MCNC: 4.1 G/DL (ref 3.4–5)
ALP SERPL-CCNC: 38 U/L (ref 40–150)
ALT SERPL W P-5'-P-CCNC: 46 U/L (ref 0–70)
ANION GAP SERPL CALCULATED.3IONS-SCNC: 6 MMOL/L (ref 3–14)
AST SERPL W P-5'-P-CCNC: 24 U/L (ref 0–45)
BILIRUB SERPL-MCNC: 0.6 MG/DL (ref 0.2–1.3)
BUN SERPL-MCNC: 20 MG/DL (ref 7–30)
CALCIUM SERPL-MCNC: 9.2 MG/DL (ref 8.5–10.1)
CHLORIDE SERPL-SCNC: 108 MMOL/L (ref 94–109)
CHOLEST SERPL-MCNC: 209 MG/DL
CO2 SERPL-SCNC: 27 MMOL/L (ref 20–32)
CREAT SERPL-MCNC: 1.07 MG/DL (ref 0.66–1.25)
GFR SERPL CREATININE-BSD FRML MDRD: 77 ML/MIN/{1.73_M2}
GLUCOSE SERPL-MCNC: 96 MG/DL (ref 70–99)
HDLC SERPL-MCNC: 43 MG/DL
LDLC SERPL CALC-MCNC: 144 MG/DL
NONHDLC SERPL-MCNC: 166 MG/DL
POTASSIUM SERPL-SCNC: 4.5 MMOL/L (ref 3.4–5.3)
PROT SERPL-MCNC: 7.2 G/DL (ref 6.8–8.8)
PSA SERPL-ACNC: 0.9 UG/L (ref 0–4)
SODIUM SERPL-SCNC: 141 MMOL/L (ref 133–144)
TRIGL SERPL-MCNC: 111 MG/DL

## 2019-11-14 ASSESSMENT — ANXIETY QUESTIONNAIRES: GAD7 TOTAL SCORE: 0

## 2019-12-14 ENCOUNTER — OFFICE VISIT (OUTPATIENT)
Dept: URGENT CARE | Facility: URGENT CARE | Age: 54
End: 2019-12-14
Payer: COMMERCIAL

## 2019-12-14 VITALS
BODY MASS INDEX: 28.7 KG/M2 | WEIGHT: 200 LBS | HEART RATE: 75 BPM | TEMPERATURE: 97.8 F | SYSTOLIC BLOOD PRESSURE: 118 MMHG | RESPIRATION RATE: 16 BRPM | DIASTOLIC BLOOD PRESSURE: 70 MMHG | OXYGEN SATURATION: 97 %

## 2019-12-14 DIAGNOSIS — J01.90 ACUTE SINUSITIS WITH COEXISTING CONDITION, NEED PROPHYLACTIC TREATMENT: Primary | ICD-10-CM

## 2019-12-14 PROCEDURE — 99213 OFFICE O/P EST LOW 20 MIN: CPT | Performed by: FAMILY MEDICINE

## 2019-12-14 NOTE — PROGRESS NOTES
SUBJECTIVE:   Willard Manzano is a 54 year old male presenting with a chief complaint of URI symptoms initially for about a week, developed more sinus congestion with greenish drainage and headaches for the past 1 week.  More run down, no fever.    Onset of symptoms was 1 week(s) ago.  Course of illness is worsening.    Severity moderate  Current and Associated symptoms: sinus pressure, purulent drainage  Treatment measures tried include Tylenol/Ibuprofen, Fluids and Rest.  Predisposing factors include seasonal allergies.    Past Medical History:   Diagnosis Date     Blood clotting disorder (H)      Cancer (H)     Skin     Chiari malformation type I (H)      Elevated fasting glucose 1/19/2012     Headaches, migraine      Headaches, migraine      Other and unspecified hyperlipidemia      Rotator cuff tear 1/22/2012     Rotator cuff tear 1/22/2012     Rotator cuff tendinitis 1/2011    Inj. Dr. Cheney.      Spinal headache 2007    spontaneous spinal fluid leak - 2 blood patches     Urethral polyp 6-24-10    Dr. Card     Urethral polyp 6/24/2010    Dr. Card      Current Outpatient Medications   Medication Sig Dispense Refill     aspirin 81 MG EC tablet Take 81 mg by mouth daily       fenofibrate (TRICOR) 145 MG tablet TAKE 1 TABLET (145 MG) BY MOUTH DAILY 90 tablet 3     metFORMIN (GLUCOPHAGE-XR) 500 MG 24 hr tablet TAKE 1 TABLET (500 MG) BY MOUTH DAILY (WITH DINNER) 90 tablet 3     Omega-3 Fatty Acids (FISH OIL PO)        omeprazole (PRILOSEC) 40 MG DR capsule TAKE 1 CAPSULE (40 MG) BY MOUTH DAILY TAKE 30-60 MINUTES BEFORE A MEAL. 90 capsule 3     Social History     Tobacco Use     Smoking status: Never Smoker     Smokeless tobacco: Never Used   Substance Use Topics     Alcohol use: Yes     Alcohol/week: 0.0 standard drinks     Comment: 1-2 drinks a week       ROS:  Review of systems negative except as stated above.    OBJECTIVE:  /70 (BP Location: Right arm)   Pulse 75   Temp 97.8  F (36.6  C) (Tympanic)    Resp 16   Wt 90.7 kg (200 lb)   SpO2 97%   BMI 28.70 kg/m    GENERAL APPEARANCE: healthy, alert and no distress  EYES: EOMI,  PERRL, conjunctiva clear  HENT: ear canals and TM's normal.  Nose and mouth without ulcers, erythema or lesions  NECK: supple, nontender, no lymphadenopathy  RESP: lungs clear to auscultation - no rales, rhonchi or wheezes  CV: regular rates and rhythm, normal S1 S2, no murmur noted  PSYCH: mentation appears normal and affect normal/bright    ASSESSMENT/PLAN:  (J01.90) Acute sinusitis with coexisting condition, need prophylactic treatment  (primary encounter diagnosis)  Plan: amoxicillin-clavulanate (AUGMENTIN) 875-125 MG         tablet            Reassurance given, reviewed symptomatic treatment with tylenol, ibuprofen, plenty of fluids and rest.  RX Augmentin given for sinus infection due to worsening symptoms and co-morbid medical problems.    Follow up with primary provider if no resolution of symptoms in 1 week    James Cifuentes MD, MD  December 14, 2019 11:12 AM

## 2020-10-28 ENCOUNTER — MYC MEDICAL ADVICE (OUTPATIENT)
Dept: FAMILY MEDICINE | Facility: CLINIC | Age: 55
End: 2020-10-28

## 2020-10-29 ENCOUNTER — VIRTUAL VISIT (OUTPATIENT)
Dept: FAMILY MEDICINE | Facility: OTHER | Age: 55
End: 2020-10-29
Payer: COMMERCIAL

## 2020-10-29 PROCEDURE — 99421 OL DIG E/M SVC 5-10 MIN: CPT | Performed by: PHYSICIAN ASSISTANT

## 2020-10-29 NOTE — PROGRESS NOTES
"Date: 10/29/2020 08:50:06  Clinician: Audrey Scott  Clinician NPI: [id number]  Patient: Willard Manzano  Patient : 1965  Patient Address: [address] [place] [address]  Patient Phone: [phone]  Visit Protocol: URI  Patient Summary:  Willard is a 55 year old ( : 1965 ) male who initiated a [facility] Visit for COVID-19 (Coronavirus) evaluation and screening. When asked the question \"Please sign me up to receive news, health information and promotions from [facility].\", Willard responded \"No\".    When asked when his symptoms started, Willard reported that he does not have any symptoms.   He denies having recent facial or sinus surgery in the past 60 days and taking antibiotic medication in the past month.    Pertinent COVID-19 (Coronavirus) information  Willard does not work or volunteer as healthcare worker or a . In the past 14 days, Willard has not worked or volunteered at a healthcare facility or group living setting.   In the past 14 days, he also has not lived in a congregate living setting.   Willard has had a close contact with a laboratory-confirmed COVID-19 patient in the last 14 days. He was not exposed at his work. Date Willard was exposed to the laboratory-confirmed COVID-19 patient: 10/27/2020   Additional information about contact with COVID-19 (Coronavirus) patient as reported by the patient (free text): Visited with my 8 year old granddaughter outside for 20 minutes. Gave her a high five. She tested positive next day.   Willard is not living in the same household with the COVID-19 positive patient. He was not in an enclosed space for greater than 15 minutes with the COVID-19 patient.   Since 2019, Willard has not been diagnosed with lab-confirmed COVID-19 test and has not had upper respiratory infection or influenza-like illness.   Pertinent medical history  Willard needs a return to work/school note.   Weight: 205 lbs   Willard does not smoke or use smokeless tobacco.   Weight: 205 lbs "    MEDICATIONS: fenofibrate oral, omeprazole-sodium bicarbonate oral, metformin oral, ALLERGIES: NKDA  Clinician Response:  Dear Willard,   Based on your exposure to COVID-19 (coronavirus), we would like to test you for this virus.  1. Please call [phone] to schedule your visit. Explain that you were referred by [facility] to have a COVID-19 test. Be ready to share your [facility] visit ID number.   The following will serve as your written order for this COVID Test, ordered by me, for the indication of suspected COVID [Z20.828]: The test will be ordered in [employer], our electronic health record, after you are scheduled. It will show as ordered and authorized by Kleber Cannon MD.  Order: COVID-19 (coronavirus) PCR for ASYMPTOMATIC EXPOSURE testing from [facility].   If you know you have had close contact with someone who tested positive, you should be quarantined for 14 days after this exposure. You should stay in quarantine for the14 days even if the covid test is negative, the optimal time to test after exposure is 5-7 days from the exposure  Quarantine means   What should I do?  For safety, it's very important to follow these rules. Do this for 14 days after the date you were last exposed to the virus..  Stay home and away from others. Don't go to school or anywhere else. Generally quarantine means staying home from work but there are some exceptions to this. Please contact your workplace.   No hugging, kissing or shaking hands.  Don't let anyone visit.  Cover your mouth and nose with a mask, tissue or washcloth to avoid spreading germs.  Wash your hands and face often. Use soap and water.  What are the symptoms of COVID-19?  The most common symptoms are cough, fever and trouble breathing. Less common symptoms include headache, body aches, fatigue (feeling very tired), chills, sore throat, stuffy or runny nose, diarrhea (loose poop), loss of taste or smell, belly pain, and nausea or vomiting (feeling sick to your stomach or  throwing up).  After 14 days, if you have still don't have symptoms, you likely don't have this virus.  If you develop symptoms, follow these guidelines.  If you're normally healthy: Please start another [facility] visit to report your symptoms. Go to [email or web address].  If you have a serious health problem (like cancer, heart failure, an organ transplant or kidney disease): Call your specialty clinic. Let them know that you might have COVID-19.  2. When it's time for your COVID test:  Stay at least 6 feet away from others. (If someone will drive you to your test, stay in the backseat, as far away from the  as you can.)  Cover your mouth and nose with a mask, tissue or washcloth.  Go straight to the testing site. Don't make any stops on the way there or back.  Please note  Caregivers in these groups are at risk for severe illness due to COVID-19:  o People 65 years and older  o People who live in a nursing home or long-term care facility  o People with chronic disease (lung, heart, cancer, diabetes, kidney, liver, immunologic)  o People who have a weakened immune system, including those who:  Are in cancer treatment  Take medicine that weakens the immune system, such as corticosteroids  Had a bone marrow or organ transplant  Have an immune deficiency  Have poorly controlled HIV or AIDS  Are obese (body mass index of 40 or higher)  Smoke regularly  Where can I get more information?  [facility] -- About COVID-19: [email or web address]  CDC -- What to Do If You're Sick: www.cdc.gov/coronavirus/2019-ncov/about/steps-when-sick.html  CDC -- Ending Home Isolation: www.cdc.gov/coronavirus/2019-ncov/hcp/disposition-in-home-patients.html  CDC -- Caring for Someone: www.cdc.gov/coronavirus/2019-ncov/if-you-are-sick/care-for-someone.html  [facility] -- Interim Guidance for Hospital Discharge to Home: www.health.[facility].[place]/diseases/coronavirus/hcp/hospdischarge.pdf  [facility] clinical trials (COVID-19  research studies): clinicalaffairs.[facility]-clinical-trials  Below are the COVID-19 hotlines at the [place] [facility] ([facility]). Interpreters are available.  For health questions: Call 584-744-2375 or 1-490.774.3620 (7 a.m. to 7 p.m.)  For questions about schools and childcare: Call 859-984-0753 or 1-402.145.2735 (7 a.m. to 7 p.m.)    Diagnosis: Contact with and (suspected) exposure to other viral communicable diseases  Diagnosis ICD: Z20.828

## 2020-10-31 DIAGNOSIS — Z20.822 SUSPECTED COVID-19 VIRUS INFECTION: Primary | ICD-10-CM

## 2020-11-01 DIAGNOSIS — Z20.822 SUSPECTED COVID-19 VIRUS INFECTION: ICD-10-CM

## 2020-11-01 PROCEDURE — U0003 INFECTIOUS AGENT DETECTION BY NUCLEIC ACID (DNA OR RNA); SEVERE ACUTE RESPIRATORY SYNDROME CORONAVIRUS 2 (SARS-COV-2) (CORONAVIRUS DISEASE [COVID-19]), AMPLIFIED PROBE TECHNIQUE, MAKING USE OF HIGH THROUGHPUT TECHNOLOGIES AS DESCRIBED BY CMS-2020-01-R: HCPCS | Performed by: PHYSICIAN ASSISTANT

## 2020-11-02 LAB
SARS-COV-2 RNA SPEC QL NAA+PROBE: NOT DETECTED
SPECIMEN SOURCE: NORMAL

## 2020-11-16 ENCOUNTER — OFFICE VISIT (OUTPATIENT)
Dept: FAMILY MEDICINE | Facility: CLINIC | Age: 55
End: 2020-11-16
Payer: COMMERCIAL

## 2020-11-16 ENCOUNTER — ANCILLARY PROCEDURE (OUTPATIENT)
Dept: GENERAL RADIOLOGY | Facility: CLINIC | Age: 55
End: 2020-11-16
Attending: FAMILY MEDICINE
Payer: COMMERCIAL

## 2020-11-16 DIAGNOSIS — E78.5 HYPERLIPIDEMIA LDL GOAL <130: ICD-10-CM

## 2020-11-16 DIAGNOSIS — G89.29 CHRONIC PAIN OF LEFT THUMB: ICD-10-CM

## 2020-11-16 DIAGNOSIS — Z23 NEED FOR PROPHYLACTIC VACCINATION AND INOCULATION AGAINST INFLUENZA: ICD-10-CM

## 2020-11-16 DIAGNOSIS — M79.645 CHRONIC PAIN OF LEFT THUMB: ICD-10-CM

## 2020-11-16 DIAGNOSIS — G57.93 NEUROPATHY OF BOTH FEET: ICD-10-CM

## 2020-11-16 DIAGNOSIS — Z12.5 SCREENING FOR PROSTATE CANCER: ICD-10-CM

## 2020-11-16 DIAGNOSIS — R07.89 ATYPICAL CHEST PAIN: ICD-10-CM

## 2020-11-16 DIAGNOSIS — Z12.11 COLON CANCER SCREENING: ICD-10-CM

## 2020-11-16 DIAGNOSIS — Z00.00 ROUTINE GENERAL MEDICAL EXAMINATION AT A HEALTH CARE FACILITY: Primary | ICD-10-CM

## 2020-11-16 DIAGNOSIS — R73.03 PREDIABETES: ICD-10-CM

## 2020-11-16 PROCEDURE — 90682 RIV4 VACC RECOMBINANT DNA IM: CPT | Performed by: FAMILY MEDICINE

## 2020-11-16 PROCEDURE — 99396 PREV VISIT EST AGE 40-64: CPT | Mod: 25 | Performed by: FAMILY MEDICINE

## 2020-11-16 PROCEDURE — 99214 OFFICE O/P EST MOD 30 MIN: CPT | Mod: 25 | Performed by: FAMILY MEDICINE

## 2020-11-16 PROCEDURE — 71046 X-RAY EXAM CHEST 2 VIEWS: CPT | Performed by: RADIOLOGY

## 2020-11-16 PROCEDURE — 90471 IMMUNIZATION ADMIN: CPT | Performed by: FAMILY MEDICINE

## 2020-11-16 PROCEDURE — 93000 ELECTROCARDIOGRAM COMPLETE: CPT | Performed by: FAMILY MEDICINE

## 2020-11-16 RX ORDER — FEXOFENADINE HCL 180 MG/1
180 TABLET ORAL DAILY PRN
COMMUNITY
End: 2022-05-18

## 2020-11-16 RX ORDER — ZOSTER VACCINE RECOMBINANT, ADJUVANTED 50 MCG/0.5
1 KIT INTRAMUSCULAR ONCE
Qty: 0.5 ML | Refills: 0 | Status: CANCELLED | OUTPATIENT
Start: 2020-11-16 | End: 2020-11-16

## 2020-11-16 SDOH — HEALTH STABILITY: MENTAL HEALTH: HOW MANY STANDARD DRINKS CONTAINING ALCOHOL DO YOU HAVE ON A TYPICAL DAY?: 1 OR 2

## 2020-11-16 SDOH — HEALTH STABILITY: MENTAL HEALTH: HOW OFTEN DO YOU HAVE A DRINK CONTAINING ALCOHOL?: 2-4 TIMES A MONTH

## 2020-11-16 SDOH — HEALTH STABILITY: MENTAL HEALTH: HOW OFTEN DO YOU HAVE 6 OR MORE DRINKS ON ONE OCCASION?: NOT ASKED

## 2020-11-16 ASSESSMENT — ANXIETY QUESTIONNAIRES
3. WORRYING TOO MUCH ABOUT DIFFERENT THINGS: NOT AT ALL
GAD7 TOTAL SCORE: 0
6. BECOMING EASILY ANNOYED OR IRRITABLE: NOT AT ALL
7. FEELING AFRAID AS IF SOMETHING AWFUL MIGHT HAPPEN: NOT AT ALL
GAD7 TOTAL SCORE: 0
GAD7 TOTAL SCORE: 0
2. NOT BEING ABLE TO STOP OR CONTROL WORRYING: NOT AT ALL
7. FEELING AFRAID AS IF SOMETHING AWFUL MIGHT HAPPEN: NOT AT ALL
1. FEELING NERVOUS, ANXIOUS, OR ON EDGE: NOT AT ALL
5. BEING SO RESTLESS THAT IT IS HARD TO SIT STILL: NOT AT ALL
4. TROUBLE RELAXING: NOT AT ALL

## 2020-11-16 ASSESSMENT — ENCOUNTER SYMPTOMS
NERVOUS/ANXIOUS: 0
CONSTIPATION: 0
HEMATURIA: 0
CHILLS: 0
HEMATOCHEZIA: 0
DIARRHEA: 0
COUGH: 0
ABDOMINAL PAIN: 0
EYE PAIN: 0
DIZZINESS: 0

## 2020-11-16 ASSESSMENT — PATIENT HEALTH QUESTIONNAIRE - PHQ9
SUM OF ALL RESPONSES TO PHQ QUESTIONS 1-9: 0
10. IF YOU CHECKED OFF ANY PROBLEMS, HOW DIFFICULT HAVE THESE PROBLEMS MADE IT FOR YOU TO DO YOUR WORK, TAKE CARE OF THINGS AT HOME, OR GET ALONG WITH OTHER PEOPLE: NOT DIFFICULT AT ALL
SUM OF ALL RESPONSES TO PHQ QUESTIONS 1-9: 0

## 2020-11-16 NOTE — PATIENT INSTRUCTIONS
Check on shingles vaccine coverage.      Preventive Health Recommendations  Male Ages 50 - 64    Yearly exam:             See your health care provider every year in order to  o   Review health changes.   o   Discuss preventive care.    o   Review your medicines if your doctor has prescribed any.     Have a cholesterol test every 5 years, or more frequently if you are at risk for high cholesterol/heart disease.     Have a diabetes test (fasting glucose) every three years. If you are at risk for diabetes, you should have this test more often.     Have a colonoscopy at age 50, or have a yearly FIT test (stool test). These exams will check for colon cancer.      Talk with your health care provider about whether or not a prostate cancer screening test (PSA) is right for you.    You should be tested each year for STDs (sexually transmitted diseases), if you re at risk.     Shots: Get a flu shot each year. Get a tetanus shot every 10 years.     Nutrition:    Eat at least 5 servings of fruits and vegetables daily.     Eat whole-grain bread, whole-wheat pasta and brown rice instead of white grains and rice.     Get adequate Calcium and Vitamin D.     Lifestyle    Exercise for at least 150 minutes a week (30 minutes a day, 5 days a week). This will help you control your weight and prevent disease.     Limit alcohol to one drink per day.     No smoking.     Wear sunscreen to prevent skin cancer.     See your dentist every six months for an exam and cleaning.     See your eye doctor every 1 to 2 years.

## 2020-11-16 NOTE — PROGRESS NOTES
SUBJECTIVE:   CC: Willard Manzano is an 55 year old male who presents for preventative health visit.     Future Appointments   Date Time Provider Department Center   2020  3:00 PM Coming Waleska Davidson MD [facility]     Appointment Notes for this encounter:   Ok per [facility]- Annual physical, renew/review prescriptions, update family history.    Health Maintenance Due   Topic Date Due     ZOSTER IMMUNIZATION (1 of 2) 2013     A1C  2020     LIPID  2020     ANNUAL REVIEW OF HM ORDERS  2020     PREVENTIVE CARE VISIT  2020     Health Maintenance addressed:  Immunizations    Immunizations Pt will update today    MyChart Status:  Active and Using      Patient has been advised of split billing requirements and indicates understandin.  Patient is here for physical.  He would like an overall check up today, is worried about cancer risks.  Has a brother who was just diagnosed with lung cancer, at age 65. Brother had gone in for a physical and found the nodule.  Father passed from cancer, older sister had breast cancer, second oldest sister had cancer, had another brother who passed from cancer.    2.  Left thumb is bothersome, occasional swelling, no redness or warmth.  Discomfort with grabbing some things, hands will cramp up sometimes.      3.  Toes are tingling, almost like they feel asleep.  Not painful per se, able to walk.  No swelling.  No weakness.  Sporadic in nature.    4.  Some discomfort in central chest, lasts 15 minutes to an hour.  No jaw or shoulder pain, might have occasional SOB.  Depending on what he ate may have some flushing, but not quite sweating.  Occurs at rest, sometimes with activity, but usually not.  Will sit and it goes away on its own.  No nausea or vomiting.  Does have a history of GERD, unsure if this is just heartburn.    Healthy Habits:     Getting at least 3 servings of Calcium per day:  Yes    Bi-annual eye exam:  Yes    Dental care twice a year:   Yes    Sleep apnea or symptoms of sleep apnea:  None    Diet:  Regular (no restrictions)    Frequency of exercise:  2-3 days/week    Duration of exercise:  45-60 minutes    Taking medications regularly:  Yes    Medication side effects:  None    PHQ-2 Total Score: 0    Additional concerns today:  No        Today's PHQ-2 Score:   PHQ-2 ( 1999 Pf[employer]) 11/16/2020   Q1: Little interest or pleasure in doing things 0   Q2: Feeling down, depressed or hopeless 0   PHQ-2 Score 0   Q1: Little interest or pleasure in doing things Not at all   Q2: Feeling down, depressed or hopeless Not at all   PHQ-2 Score 0       Abuse: Current or Past(Physical, Sexual or Emotional)- No  Do you feel safe in your environment? Yes    Have you ever done Advance Care Planning? (For example, a Health Directive, POLST, or a discussion with a medical provider or your loved ones about your wishes): No, advance care planning information given to patient to review.  Patient declined advance care planning discussion at this time.    Social History     Tobacco Use     Smoking status: Never Smoker     Smokeless tobacco: Never Used     Tobacco comment: Some sporadic use 40+ years ago- cigars   Substance Use Topics     Alcohol use: Yes     Frequency: 2-4 times a month     Drinks per session: 1 or 2     Comment: 1-2 drinks a week       Alcohol Use 11/16/2020   Prescreen: >3 drinks/day or >7 drinks/week? No   Prescreen: >3 drinks/day or >7 drinks/week? -       Last PSA:   PSA   Date Value Ref Range Status   11/13/2019 0.90 0 - 4 ug/L Final     Comment:     Assay Method:  Chemiluminescence using Siemens Vista analyzer       Reviewed orders with patient. Reviewed health maintenance and updated orders accordingly - Yes  Lab work is in process  Labs reviewed in EPIC  BP Readings from Last 3 Encounters:   12/14/19 118/70   11/13/19 120/82   12/26/18 126/87    Wt Readings from Last 3 Encounters:   12/14/19 90.7 kg (200 lb)   11/13/19 88 kg (194 lb)   12/26/18 94.8  kg (209 lb)                  Patient Active Problem List   Diagnosis     Chiari malformation type I (H)     Hyperlipidemia LDL goal <160     Family history of colon cancer     Elevated fasting glucose     Family history of coronary artery disease     Esophageal reflux     Benign non-nodular prostatic hyperplasia with lower urinary tract symptoms     Personal history of DVT (deep vein thrombosis)     Prediabetes     Past Surgical History:   Procedure Laterality Date     ABDOMEN SURGERY      Umbilical Hernia repair     BIOPSY      arms cancer, 2 spots     COLONOSCOPY  12/29/10    non-adenomatous polyp. Advised 5 yr f/u.      HC COLONOSCOPY THRU STOMA, DIAGNOSTIC  08    normal. 3 yr f/u due to brother CA age 43.     HC REMOVAL OF TONSILS,<11 Y/O       HC VASECTOMY UNILAT/BILAT W POSTOP SEMEN       HERNIA REPAIR       ORTHOPEDIC SURGERY  2018    Rotator Cuff Repair     SURGICAL HISTORY OF -   2006    umbilical hernia repair with mesh       Social History     Tobacco Use     Smoking status: Never Smoker     Smokeless tobacco: Never Used     Tobacco comment: Some sporadic use 40+ years ago- cigars   Substance Use Topics     Alcohol use: Yes     Frequency: 2-4 times a month     Drinks per session: 1 or 2     Comment: 1-2 drinks a week     Family History   Problem Relation Age of Onset     Diabetes Mother         dx in her 40's. overwt. Now IDDM.     Hypertension Mother      Lipids Mother      Gallbladder Disease Mother      Cancer Father          lung CA with brain mets age 65     Other Cancer Father         Lung     Substance Abuse Father         Alcoholism     Other Cancer Brother         Lung     Cardiovascular Brother         Loi. Brother MI at age 49. Had another stent at age 52     Diabetes Sister      Family History Negative Sister      Allergies Son      Substance Abuse Son      Family History Negative Son      Family History Negative Daughter      Breast Cancer Sister         Dx bilateral breast  CA at age 56.      Other Cancer Sister         Lung     Breast Cancer Sister         (pronounced Muh-jay)     Colon Cancer Brother         Diagnosed at age 40s     Other Cancer Brother         Lung, mets to brain     Substance Abuse Son         Alcoholism         Current Outpatient Medications   Medication Sig Dispense Refill     aspirin 81 MG EC tablet Take 81 mg by mouth daily       fenofibrate (TRICOR) 145 MG tablet TAKE 1 TABLET (145 MG) BY MOUTH DAILY 90 tablet 3     fexofenadine (ALLEGRA) 180 MG tablet Take 180 mg by mouth daily as needed       metFORMIN (GLUCOPHAGE-XR) 500 MG 24 hr tablet TAKE 1 TABLET (500 MG) BY MOUTH DAILY (WITH DINNER) 90 tablet 3     Omega-3 Fatty Acids (FISH OIL PO)        omeprazole (PRILOSEC) 40 MG DR capsule TAKE 1 CAPSULE (40 MG) BY MOUTH DAILY TAKE 30-60 MINUTES BEFORE A MEAL. 90 capsule 3     Allergies   Allergen Reactions     Cats      Congestion and drainage     Grass Cough     No Known Allergies      Recent Labs   Lab Test 11/13/19  1045 12/26/18  0747 06/14/18  1600 11/24/17  0810 08/09/13  1627 08/09/13  1627   A1C 6.1* 6.3* 6.1* 6.0   < >  --    * 141*  --  133*   < >  --    HDL 43 38*  --  52   < >  --    TRIG 111 164*  --  138   < >  --    ALT 46  --  48 48   < > 63   CR 1.07  --  1.20 1.12   < > 1.18   GFRESTIMATED 77  --  63 68   < > 65   GFRESTBLACK 89  --  76 82   < > 79   POTASSIUM 4.5  --  4.2 4.3   < > 4.1   TSH  --   --   --   --   --  1.51    < > = values in this interval not displayed.        Reviewed and updated as needed this visit by clinical staff  Tobacco  Allergies  Meds  Problems    Fam Hx  Soc Hx        Reviewed and updated as needed this visit by Provider  Tobacco  Allergies  Meds  Problems    Fam Hx  Soc Hx       Past Medical History:   Diagnosis Date     Arthritis 1/2011     Blood clotting disorder (H)      Cancer (H)     Skin     Chiari malformation type I (H)      Elevated fasting glucose 1/19/2012     Headaches, migraine       Headaches, migraine      Other and unspecified hyperlipidemia      Rotator cuff tear 1/22/2012     Rotator cuff tear 1/22/2012     Rotator cuff tendinitis 1/2011    Inj. Dr. Cheney.      Spinal headache 2007    spontaneous spinal fluid leak - 2 blood patches     Urethral polyp 6-24-10    Dr. Card     Urethral polyp 6/24/2010    Dr. Card       Past Surgical History:   Procedure Laterality Date     ABDOMEN SURGERY  2006    Umbilical Hernia repair     BIOPSY      arms cancer, 2 spots     COLONOSCOPY  12/29/10    non-adenomatous polyp. Advised 5 yr f/u.      HC COLONOSCOPY THRU STOMA, DIAGNOSTIC  6/26/08    normal. 3 yr f/u due to brother CA age 43.     HC REMOVAL OF TONSILS,<11 Y/O       HC VASECTOMY UNILAT/BILAT W POSTOP SEMEN       HERNIA REPAIR  2006     ORTHOPEDIC SURGERY  2018    Rotator Cuff Repair     SURGICAL HISTORY OF -   2/2006    umbilical hernia repair with mesh       Review of Systems   Constitutional: Negative for chills.   HENT: Negative for congestion and ear pain.    Eyes: Negative for pain.   Respiratory: Negative for cough.    Cardiovascular: Negative for chest pain.   Gastrointestinal: Negative for abdominal pain, constipation, diarrhea and hematochezia.   Genitourinary: Negative for hematuria.   Neurological: Negative for dizziness.   Psychiatric/Behavioral: The patient is not nervous/anxious.          OBJECTIVE:   There were no vitals taken for this visit.    Physical Exam  GENERAL: healthy, alert and no distress  EYES: Eyes grossly normal to inspection, PERRL and conjunctivae and sclerae normal  HENT: ear canals and TM's normal, nose and mouth without ulcers or lesions  NECK: no adenopathy, no asymmetry, masses, or scars and thyroid normal to palpation  RESP: lungs clear to auscultation - no rales, rhonchi or wheezes  CV: heart sounds distant but regular rate and rhythm, normal S1 S2, no S3 or S4, no murmur, click or rub, no peripheral edema and peripheral pulses strong  ABDOMEN: soft,  nontender, no hepatosplenomegaly, no masses and bowel sounds normal  RECTAL: normal sphincter tone, no rectal masses, prostate normal size, smooth, nontender without nodules or masses  MS: no gross musculoskeletal defects noted, no edema  SKIN: no suspicious lesions or rashes  NEURO: Normal strength and tone, mentation intact and speech normal  PSYCH: mentation appears normal, affect normal/bright    Diagnostic Test Results:  Labs reviewed in Epic    ASSESSMENT/PLAN:   1. Routine general medical examination at a health care facility  Exam completed today, routine labs ordered.  Follow up one year or sooner as needed.  - **CBC with platelets FUTURE anytime; Future  - **Comprehensive metabolic panel FUTURE anytime; Future  - **TSH with free T4 reflex FUTURE anytime; Future    2. Atypical chest pain  Will order EKG, CXR, and CRP.  Unclear if etiology is related to cardiac cause or possibly GERD.  Recommended trying Pepcid or TUMS with next episode to see if pain responds.  If not, will recommend further cardiac workup given family history of CAD.  - EKG 12-lead complete w/read - Clinics  - XR Chest 2 Views  - CRP cardiac risk; Future    3. Chronic pain of left thumb  Discussed rest, ice/heat, oral and topical analgesics.  No problem with ROM, no pain elicited today.  Follow up as needed, can consider imaging and PT.    4. Neuropathy of both feet  Recommended trying different footwear, as symptoms seem like they could be related to tight shoes.  No pain, erythema, or edema noted.  Follow up as needed.    5. Prediabetes  Check labs today, further recommendations pending results.  Not currently on medication, may benefit from Metformin, etc.   - **A1C FUTURE anytime; Future  - **TSH with free T4 reflex FUTURE anytime; Future    6. Colon cancer screening  - GASTROENTEROLOGY ADULT REF PROCEDURE ONLY; Future    7. Hyperlipidemia LDL goal <130  - Lipid panel reflex to direct LDL Fasting; Future    8. Screening for prostate  "cancer  - **Prostate spec antigen screen FUTURE anytime; Future    9. Need for prophylactic vaccination and inoculation against influenza  - INFLUENZA QUAD, RECOMBINANT, P-FREE (RIV4) (FLUBLOCK) [14644]    Patient has been advised of split billing requirements and indicates understanding: Yes  COUNSELING:   Reviewed preventive health counseling, as reflected in patient instructions       Regular exercise       Healthy diet/nutrition       Immunizations    Vaccinated for: Influenza      Declined: Zoster due to cost       Colon cancer screening       Prostate cancer screening       Consider lung cancer screening for ages 55-80 years and 30 pack-year smoking history- Patient's low pack years makes him not a candidate for the screening.    Estimated body mass index is 28.7 kg/m  as calculated from the following:    Height as of 11/6/18: 1.778 m (5' 10\").    Weight as of 12/14/19: 90.7 kg (200 lb).     Weight management plan: Discussed healthy diet and exercise guidelines    He reports that he has never smoked. He has never used smokeless tobacco.      Counseling Resources:  ATP IV Guidelines  Pooled Cohorts Equation Calculator  FRAX Risk Assessment  ICSI Preventive Guidelines  Dietary Guidelines for Americans, 2010  USDA's MyPlate  ASA Prophylaxis  Lung CA Screening    April J. Nj Davidson MD  [facility]  "

## 2020-11-17 ASSESSMENT — ANXIETY QUESTIONNAIRES: GAD7 TOTAL SCORE: 0

## 2020-11-17 ASSESSMENT — PATIENT HEALTH QUESTIONNAIRE - PHQ9: SUM OF ALL RESPONSES TO PHQ QUESTIONS 1-9: 0

## 2020-11-18 ENCOUNTER — ALLIED HEALTH/NURSE VISIT (OUTPATIENT)
Dept: FAMILY MEDICINE | Facility: CLINIC | Age: 55
End: 2020-11-18
Payer: COMMERCIAL

## 2020-11-18 DIAGNOSIS — Z00.00 ROUTINE GENERAL MEDICAL EXAMINATION AT A HEALTH CARE FACILITY: Primary | ICD-10-CM

## 2020-11-18 PROCEDURE — 99207 PR NO CHARGE NURSE ONLY: CPT

## 2020-11-18 PROCEDURE — 93000 ELECTROCARDIOGRAM COMPLETE: CPT

## 2020-11-18 NOTE — LETTER
November 19, 2020      Willard Manzano  [address] [facility] [id number]        Dear ,    We are writing to inform you of your test results.    {results letter list:[id number]}    No results found from the In Basket message.    If you have any questions or concerns, please call the clinic at the number listed above.       Sincerely,        LYNN RN VISITS

## 2020-11-18 NOTE — LETTER
November 19, 2020      Willard Manzano  [address] [address]        Dear ,    We are writing to inform you of your test results.    Per Dr. Nj Davidson, your EKG report is normal. A copy has been included with this letter for your records.        If you have any questions or concerns, please call the clinic at the number listed above.       Sincerely,    Yesica Nagel  [facility] / EMT-B  Ph #: [phone]

## 2020-11-24 ENCOUNTER — MYC MEDICAL ADVICE (OUTPATIENT)
Dept: FAMILY MEDICINE | Facility: CLINIC | Age: 55
End: 2020-11-24

## 2020-11-24 NOTE — TELEPHONE ENCOUNTER
Contacted patient and scheduled a lab only appt for 12/07/2020 @3pm    Yessica Torres/MERE  [place]---[facility]

## 2020-11-29 DIAGNOSIS — Z11.59 ENCOUNTER FOR SCREENING FOR OTHER VIRAL DISEASES: Primary | ICD-10-CM

## 2020-12-07 DIAGNOSIS — Z12.5 SCREENING FOR PROSTATE CANCER: ICD-10-CM

## 2020-12-07 DIAGNOSIS — Z00.00 ROUTINE GENERAL MEDICAL EXAMINATION AT A HEALTH CARE FACILITY: ICD-10-CM

## 2020-12-07 DIAGNOSIS — E78.5 HYPERLIPIDEMIA LDL GOAL <130: ICD-10-CM

## 2020-12-07 DIAGNOSIS — R07.89 ATYPICAL CHEST PAIN: ICD-10-CM

## 2020-12-07 DIAGNOSIS — Z11.59 ENCOUNTER FOR SCREENING FOR OTHER VIRAL DISEASES: ICD-10-CM

## 2020-12-07 DIAGNOSIS — R73.03 PREDIABETES: ICD-10-CM

## 2020-12-07 DIAGNOSIS — Z11.59 SCREENING FOR VIRAL DISEASE: Primary | ICD-10-CM

## 2020-12-07 LAB
ERYTHROCYTE [DISTWIDTH] IN BLOOD BY AUTOMATED COUNT: 13.4 % (ref 10–15)
HBA1C MFR BLD: 6.1 % (ref 0–5.6)
HCT VFR BLD AUTO: 46.5 % (ref 40–53)
HGB BLD-MCNC: 15.8 G/DL (ref 13.3–17.7)
MCH RBC QN AUTO: 29.6 PG (ref 26.5–33)
MCHC RBC AUTO-ENTMCNC: 34 G/DL (ref 31.5–36.5)
MCV RBC AUTO: 87 FL (ref 78–100)
PLATELET # BLD AUTO: 314 10E9/L (ref 150–450)
RBC # BLD AUTO: 5.34 10E12/L (ref 4.4–5.9)
WBC # BLD AUTO: 5.2 10E9/L (ref 4–11)

## 2020-12-07 PROCEDURE — 83036 HEMOGLOBIN GLYCOSYLATED A1C: CPT | Performed by: FAMILY MEDICINE

## 2020-12-07 PROCEDURE — 86141 C-REACTIVE PROTEIN HS: CPT | Performed by: FAMILY MEDICINE

## 2020-12-07 PROCEDURE — 80053 COMPREHEN METABOLIC PANEL: CPT | Performed by: FAMILY MEDICINE

## 2020-12-07 PROCEDURE — 80061 LIPID PANEL: CPT | Performed by: FAMILY MEDICINE

## 2020-12-07 PROCEDURE — 36415 COLL VENOUS BLD VENIPUNCTURE: CPT | Performed by: FAMILY MEDICINE

## 2020-12-07 PROCEDURE — 85027 COMPLETE CBC AUTOMATED: CPT | Performed by: FAMILY MEDICINE

## 2020-12-07 PROCEDURE — G0103 PSA SCREENING: HCPCS | Performed by: FAMILY MEDICINE

## 2020-12-07 PROCEDURE — 84443 ASSAY THYROID STIM HORMONE: CPT | Performed by: FAMILY MEDICINE

## 2020-12-08 LAB
ALBUMIN SERPL-MCNC: 4.2 G/DL (ref 3.4–5)
ALP SERPL-CCNC: 30 U/L (ref 40–150)
ALT SERPL W P-5'-P-CCNC: 40 U/L (ref 0–70)
ANION GAP SERPL CALCULATED.3IONS-SCNC: 6 MMOL/L (ref 3–14)
AST SERPL W P-5'-P-CCNC: 25 U/L (ref 0–45)
BILIRUB SERPL-MCNC: 0.6 MG/DL (ref 0.2–1.3)
BUN SERPL-MCNC: 23 MG/DL (ref 7–30)
CALCIUM SERPL-MCNC: 9.1 MG/DL (ref 8.5–10.1)
CHLORIDE SERPL-SCNC: 104 MMOL/L (ref 94–109)
CHOLEST SERPL-MCNC: 216 MG/DL
CO2 SERPL-SCNC: 25 MMOL/L (ref 20–32)
CREAT SERPL-MCNC: 1.12 MG/DL (ref 0.66–1.25)
GFR SERPL CREATININE-BSD FRML MDRD: 72 ML/MIN/{1.73_M2}
GLUCOSE SERPL-MCNC: 89 MG/DL (ref 70–99)
HDLC SERPL-MCNC: 42 MG/DL
LDLC SERPL CALC-MCNC: 156 MG/DL
NONHDLC SERPL-MCNC: 174 MG/DL
POTASSIUM SERPL-SCNC: 4.1 MMOL/L (ref 3.4–5.3)
PROT SERPL-MCNC: 7.5 G/DL (ref 6.8–8.8)
PSA SERPL-ACNC: 0.87 UG/L (ref 0–4)
SODIUM SERPL-SCNC: 135 MMOL/L (ref 133–144)
TRIGL SERPL-MCNC: 92 MG/DL
TSH SERPL DL<=0.005 MIU/L-ACNC: 1.45 MU/L (ref 0.4–4)

## 2020-12-10 DIAGNOSIS — R73.03 PREDIABETES: Primary | ICD-10-CM

## 2020-12-10 DIAGNOSIS — E78.5 HYPERLIPIDEMIA LDL GOAL <130: ICD-10-CM

## 2020-12-10 LAB — CRP SERPL HS-MCNC: 1.9 MG/L

## 2020-12-10 RX ORDER — SIMVASTATIN 20 MG
20 TABLET ORAL AT BEDTIME
Qty: 90 TABLET | Refills: 1 | Status: SHIPPED | OUTPATIENT
Start: 2020-12-10 | End: 2021-06-25

## 2020-12-14 ENCOUNTER — MYC MEDICAL ADVICE (OUTPATIENT)
Dept: FAMILY MEDICINE | Facility: CLINIC | Age: 55
End: 2020-12-14

## 2021-01-24 DIAGNOSIS — E78.5 HYPERLIPIDEMIA LDL GOAL <130: ICD-10-CM

## 2021-01-26 RX ORDER — FENOFIBRATE 145 MG/1
TABLET, COATED ORAL
Qty: 90 TABLET | Refills: 2 | Status: SHIPPED | OUTPATIENT
Start: 2021-01-26 | End: 2021-12-03

## 2021-02-15 DIAGNOSIS — Z11.59 SCREENING FOR VIRAL DISEASE: ICD-10-CM

## 2021-02-15 LAB
SARS-COV-2 RNA RESP QL NAA+PROBE: NORMAL
SPECIMEN SOURCE: NORMAL

## 2021-02-15 PROCEDURE — U0005 INFEC AGEN DETEC AMPLI PROBE: HCPCS | Performed by: PHYSICIAN ASSISTANT

## 2021-02-15 PROCEDURE — U0003 INFECTIOUS AGENT DETECTION BY NUCLEIC ACID (DNA OR RNA); SEVERE ACUTE RESPIRATORY SYNDROME CORONAVIRUS 2 (SARS-COV-2) (CORONAVIRUS DISEASE [COVID-19]), AMPLIFIED PROBE TECHNIQUE, MAKING USE OF HIGH THROUGHPUT TECHNOLOGIES AS DESCRIBED BY CMS-2020-01-R: HCPCS | Performed by: PHYSICIAN ASSISTANT

## 2021-02-16 LAB
LABORATORY COMMENT REPORT: NORMAL
SARS-COV-2 RNA RESP QL NAA+PROBE: NEGATIVE
SPECIMEN SOURCE: NORMAL

## 2021-02-19 ENCOUNTER — HOSPITAL ENCOUNTER (OUTPATIENT)
Facility: CLINIC | Age: 56
Discharge: HOME | End: 2021-02-19
Attending: INTERNAL MEDICINE | Admitting: INTERNAL MEDICINE
Payer: COMMERCIAL

## 2021-02-19 VITALS
WEIGHT: 200 LBS | DIASTOLIC BLOOD PRESSURE: 81 MMHG | HEIGHT: 70 IN | SYSTOLIC BLOOD PRESSURE: 120 MMHG | RESPIRATION RATE: 16 BRPM | HEART RATE: 65 BPM | OXYGEN SATURATION: 93 % | BODY MASS INDEX: 28.63 KG/M2

## 2021-02-19 LAB
COLONOSCOPY: NORMAL
GLUCOSE BLDC GLUCOMTR-MCNC: 104 MG/DL (ref 70–99)

## 2021-02-19 PROCEDURE — 45378 DIAGNOSTIC COLONOSCOPY: CPT | Performed by: INTERNAL MEDICINE

## 2021-02-19 PROCEDURE — G0500 MOD SEDAT ENDO SERVICE >5YRS: HCPCS | Performed by: INTERNAL MEDICINE

## 2021-02-19 PROCEDURE — 999N001017 HC STATISTIC GLUCOSE BY METER IP

## 2021-02-19 PROCEDURE — G0105 COLORECTAL SCRN; HI RISK IND: HCPCS | Performed by: INTERNAL MEDICINE

## 2021-02-19 PROCEDURE — 250N000011 HC RX IP 250 OP 636: Performed by: INTERNAL MEDICINE

## 2021-02-19 RX ORDER — ONDANSETRON 2 MG/ML
4 INJECTION INTRAMUSCULAR; INTRAVENOUS EVERY 6 HOURS PRN
Status: DISCONTINUED | OUTPATIENT
Start: 2021-02-19 | End: 2021-02-19 | Stop reason: HOSPADM

## 2021-02-19 RX ORDER — FLUMAZENIL 0.1 MG/ML
0.2 INJECTION, SOLUTION INTRAVENOUS
Status: DISCONTINUED | OUTPATIENT
Start: 2021-02-19 | End: 2021-02-19 | Stop reason: HOSPADM

## 2021-02-19 RX ORDER — PROCHLORPERAZINE MALEATE 10 MG
10 TABLET ORAL EVERY 6 HOURS PRN
Status: DISCONTINUED | OUTPATIENT
Start: 2021-02-19 | End: 2021-02-19 | Stop reason: HOSPADM

## 2021-02-19 RX ORDER — ONDANSETRON 2 MG/ML
4 INJECTION INTRAMUSCULAR; INTRAVENOUS
Status: DISCONTINUED | OUTPATIENT
Start: 2021-02-19 | End: 2021-02-19 | Stop reason: HOSPADM

## 2021-02-19 RX ORDER — NALOXONE HYDROCHLORIDE 0.4 MG/ML
0.4 INJECTION, SOLUTION INTRAMUSCULAR; INTRAVENOUS; SUBCUTANEOUS
Status: DISCONTINUED | OUTPATIENT
Start: 2021-02-19 | End: 2021-02-19 | Stop reason: HOSPADM

## 2021-02-19 RX ORDER — NALOXONE HYDROCHLORIDE 0.4 MG/ML
0.2 INJECTION, SOLUTION INTRAMUSCULAR; INTRAVENOUS; SUBCUTANEOUS
Status: DISCONTINUED | OUTPATIENT
Start: 2021-02-19 | End: 2021-02-19 | Stop reason: HOSPADM

## 2021-02-19 RX ORDER — ONDANSETRON 4 MG/1
4 TABLET, ORALLY DISINTEGRATING ORAL EVERY 6 HOURS PRN
Status: DISCONTINUED | OUTPATIENT
Start: 2021-02-19 | End: 2021-02-19 | Stop reason: HOSPADM

## 2021-02-19 RX ORDER — LIDOCAINE 40 MG/G
CREAM TOPICAL
Status: DISCONTINUED | OUTPATIENT
Start: 2021-02-19 | End: 2021-02-19 | Stop reason: HOSPADM

## 2021-02-19 RX ORDER — FENTANYL CITRATE 50 UG/ML
INJECTION, SOLUTION INTRAMUSCULAR; INTRAVENOUS PRN
Status: DISCONTINUED | OUTPATIENT
Start: 2021-02-19 | End: 2021-02-19 | Stop reason: HOSPADM

## 2021-02-19 ASSESSMENT — MIFFLIN-ST. JEOR: SCORE: 1733.44

## 2021-02-19 NOTE — DISCHARGE INSTRUCTIONS

## 2021-02-19 NOTE — LETTER
January 21, 2021      Willard ISAAC Jose Juan  [address] [facility] [id number]        Dear Willard,     Please be aware that coverage of these services is subject to the terms and limitations of your health insurance plan.  Call member services at your health plan with any benefit or coverage questions.    Thank you for choosing [facility]. You are scheduled for the following service(s):    Date:  2/19/21             Procedure:  COLONOSCOPY  Doctor:        Dr. Broderick Rivera   Arrival Time:  7:30  *Enter and check in at the Main Hospital Entrance*  Procedure Time:  8:00      Location:   [facility]        Endoscopy Department, First Floor         [address] [place], [place] [address]      [phone] or [phone] ([facility]) to reschedule      MIRALAX -GATORADE  PREP  Colonoscopy is the most accurate test to detect colon polyps and colon cancer; and the only test where polyps can be removed. During this procedure, a doctor examines the lining of your large intestine and rectum through a flexible tube.   Transportation  You must arrange for a ride for the day of your procedure with a responsible adult. A taxi , Uber, etc, is not an option unless you are accompanied by a responsible adult. If you fail to arrange transportation with a responsible adult, your procedure will be cancelled and rescheduled.    Purchase the  following supplies at your local pharmacy:  - 2 (two) bisacodyl tablets: each tablet contains 5 mg.  (Dulcolax  laxative NOT Dulcolax  stool softener)   - 1 (one) 8.3 oz bottle of Polyethylene Glycol (PEG) 3350 Powder   (MiraLAX , Smooth LAX , ClearLAX  or equivalent)  - 64 oz Gatorade    Regular Gatorade, Gatorade G2 , Powerade , Powerade Zero  or Pedialyte  is acceptable. Red colored flavors are not allowed; all other colors (yellow, green, orange, purple and blue) are okay. It is also okay to buy two 2.12 oz packets of powdered Gatorade that can be  mixed with water to a total volume of 64 oz of liquid.  - 1 (one) 10 oz bottle of Magnesium Citrate (Red colored flavors are not allowed)  It is also okay for you to use a 0.5 oz package of powdered magnesium citrate (17 g) mixed with 10 oz of water.      PREPARATION FOR COLONOSCOPY    7 days before:    Discontinue fiber supplements and medications containing iron. This includes Metamucil  and Fibercon ; and multivitamins with iron.    3 days before:    Begin a low-fiber diet. A low-fiber diet helps making the cleanout more effective.     Examples of a low-fiber diet include (but are not limited to): white bread, white rice, pasta, crackers, fish, chicken, eggs, ground beef, creamy peanut butter, cooked/steamed/boiled vegetables, canned fruit, bananas, melons, milk, plain yogurt cheese, salad dressing and other condiments.     The following are not allowed on a low-fiber diet: seeds, nuts, popcorn, bran, whole wheat, corn, quinoa, raw fruits and vegetables, berries and dried fruit, beans and lentils.    For additional details on low-fiber diet, please refer to the table on the last page.    2 days before:    Continue the low-fiber diet.     Drink at least 8 glasses of water throughout the day.     Stop eating solid foods at 11:45 pm.    1 day before:    In the morning: begin a clear liquid diet (liquids you can see through).     Examples of a clear liquid diet include: water, clear broth or bouillon, Gatorade, Pedialyte or Powerade, carbonated and non-carbonated soft drinks (Sprite , 7-Up , ginger ale), strained fruit juices without pulp (apple, white grape, white cranberry), Jell-O  and popsicles.     The following are not allowed on a clear liquid diet: red liquids, alcoholic beverages, dairy products (milk, creamer, and yogurt), protein shakes, creamy broths, juice with pulp and chewing tobacco.    At noon: take 2 (two) bisacodyl tablets     At 4 (and no later than 6pm): start drinking the Miralax-Gatorade  preparation (8.3 oz of Miralax mixed with 64 oz of Gatorade in a large pitcher). Drink 1(one) 8 oz glass every 15 minutes thereafter, until the mixture is gone.    COLON CLEANSING TIPS: drink adequate amounts of fluids before and after your colon cleansing to prevent dehydration. Stay near a toilet because you will have diarrhea. Even if you are sitting on the toilet, continue to drink the cleansing solution every 15 minutes. If you feel nauseous or vomit, rinse your mouth with water, take a 15 to 30-minute-break and then continue drinking the solution. You will be uncomfortable until the stool has flushed from your colon (in about 2 to 4 hours). You may feel chilled.    Day of your procedure  You may take all of your morning medications including blood pressure medications, blood thinners (if you have not been instructed to stop these by our office), methadone, anti-seizure medications with sips of water 3 hours prior to your procedure or earlier. Do not take insulin or vitamins prior to your procedure. Continue the clear liquid diet.       4 hours prior: drink 10 oz of magnesium citrate. It may be easier to drink it with a straw.    STOP consuming all liquids after that.     Do not take anything by mouth during this time.     Allow extra time to travel to your procedure as you may need to stop and use a restroom along the way.    You are ready for the procedure, if you followed all instructions and your stool is no longer formed, but clear or yellow liquid. If you are unsure whether your colon is clean, please call our office at [phone] before you leave for your appointment.    Bring the following to your procedure:  - Insurance Card/Photo ID.   - List of current medications including over-the-counter medications and supplements.   - Your rescue inhaler if you currently use one to control asthma.    Canceling or rescheduling your appointment:   If you must cancel or reschedule your appointment, please call  [phone] as soon as possible.      COLONOSCOPY PRE-PROCEDURE CHECKLIST    If you have diabetes, ask your regular doctor for diet and medication restrictions.  If you take an anticoagulant or anti-platelet medication (such as Coumadin , Lovenox , Pradaxa , Xarelto , Eliquis , etc.), please call your primary doctor for advice on holding this medication.  If you take aspirin you may continue to do so.  If you are or may be pregnant, please discuss the risks and benefits of this procedure with your doctor.        What happens during a colonoscopy?    Plan to spend up to two hours, starting at registration time, at the endoscopy center the day of your procedure. The colonoscopy takes an average of 15 to 30 minutes. Recovery time is about 30 minutes.      Before the exam:    You will change into a gown.    Your medical history and medication list will be reviewed with you, unless that has been done over the phone prior to the procedure.     A nurse will insert an intravenous (IV) line into your hand or arm.    The doctor will meet with you and will give you a consent form to sign.  During the exam:     Medicine will be given through the IV line to help you relax.     Your heart rate and oxygen levels will be monitored. If your blood pressure is low, you may be given fluids through the IV line.     The doctor will insert a flexible hollow tube, called a colonoscope, into your rectum. The scope will be advanced slowly through the large intestine (colon).    You may have a feeling of fullness or pressure.     If an abnormal tissue or a polyp is found, the doctor may remove it through the endoscope for closer examination, or biopsy. Tissue removal is painless    After the exam:           Any tissue samples removed during the exam will be sent to a lab for evaluation. It may take 5-7 working days for you to be notified of the results.     A nurse will provide you with complete discharge instructions before you leave the  endoscopy center. Be sure to ask the nurse for specific instructions if you take blood thinners such as Aspirin, Coumadin or Plavix.     The doctor will prepare a full report for you and for the physician who referred you for the procedure.     Your doctor will talk with you about the initial results of your exam.      Medication given during the exam will prohibit you from driving for the rest of the day.     Following the exam, you may resume your normal diet. Your first meal should be light, no greasy foods. Avoid alcohol until the next day.     You may resume your regular activities the day after the procedure.         LOW-FIBER DIET    Foods RECOMMENDED Foods to AVOID   Breads, Cereal, Rice and Pasta:   White bread, rolls, biscuits, croissant and dary toast.   Waffles, Lebanese toast and pancakes.   White rice, noodles, pasta, macaroni and peeled cooked potatoes.   Plain crackers and saltines.   Cooked cereals: farina, cream of rice.   Cold cereals: Puffed Rice , Rice Krispies , Corn Flakes  and Special K    Breads, Cereal, Rice and Pasta:   Breads or rolls with nuts, seeds or fruit.   Whole wheat, pumpernickel, rye breads and cornbread.   Potatoes with skin, brown or wild rice, and kasha (buckwheat).     Vegetables:   Tender cooked and canned vegetables without seeds: carrots, asparagus tips, green or wax beans, pumpkin, spinach, lima beans. Vegetables:   Raw or steamed vegetables.   Vegetables with seeds.   Sauerkraut.   Winter squash, peas, broccoli, Brussel sprouts, cabbage, onions, cauliflower, baked beans, peas and corn.   Fruits:   Strained fruit juice.   Canned fruit, except pineapple.   Ripe bananas and melon. Fruits:   Prunes and prune juice.   Raw fruits.   Dried fruits: figs, dates and raisins.   Milk/Dairy:   Milk: plain or flavored.   Yogurt, custard and ice cream.   Cheese and cottage cheese Milk/Dairy:     Meat and other proteins:   ground, well-cooked tender beef, lamb, ham, veal, pork, fish,  poultry and organ meats.   Eggs.   Peanut butter without nuts. Meat and other proteins:   Tough, fibrous meats with gristle.   Dry beans, peas and lentils.   Peanut butter with nuts.   Tofu.   Fats, Snack, Sweets, Condiments and Beverages:   Margarine, butter, oils, mayonnaise, sour cream and salad dressing, plain gravy.   Sugar, hard candy, clear jelly, honey and syrup.   Spices, cooked herbs, bouillon, broth and soups made with allowed vegetable, ketchup and mustard.   Coffee, tea and carbonated drinks.   Plain cakes, cookies and pretzels.   Gelatin, plain puddings, custard, ice cream, sherbet and popsicles. Fats, Snack, Sweets, Condiments and Beverages:   Nuts, seeds and coconut.   Jam, marmalade and preserves.   Pickles, olives, relish and horseradish.   All desserts containing nuts, seeds, dried fruit and coconut; or made from whole grains or bran.   Candy made with nuts or seeds.   Popcorn.         DIRECTIONS TO THE ENDOSCOPY DEPARTMENT    From the north ([facility])  Take [address] South, exit on [address] [address]. Get into the left hand je, turn left (east), go one-half mile to [address] and turn left. Go north to the second stoplight, take a right on [address] and follow it to the Main Hospital entrance.    From the south ([facility])  Take 35N to the 35E split and exit on [address] [address]. On [address] [address], turn left (west) to [address]. Turn right (north) on [address]. Go north to the second stoplight, take a right on [address] and follow it to the Main Hospital entrance.    From the east via 35E ([facility])  Take [address] south to [address] [address] exit. Turn right on [address] [address]. Go west to [address]. Turn right (north) on [address]. Go to the second stoplight, take a right on [address] to the Main Hospital entrance.    From the east via [address] ([facility])  Take [address] to [address]. Turn left  (south) on [address] to [address], turn left (east) on [address] and follow it to the Main Hospital entrance.    From the west via [address] ([place], [place])  Take [address] east to [address]. Turn right (south) on [address] to [address], turn left (east) on [address] and follow it to the Main Hospital entrance.

## 2021-02-19 NOTE — PRE-PROCEDURE
Pre-Endoscopy History and Physical     Willard Manzano MRN# [id number]   YOB: 1965 Age: 56 year old     Date of Procedure: 2/19/2021  Primary care provider: Cassandra Villafana  Type of Endoscopy: colonoscopy  Reason for Procedure: fam hx  Type of Anesthesia Anticipated: Moderate (conscious) sedation    HPI:    Willard is a 56 year old male who will be undergoing the above procedure.      A history and physical has been performed. The patient's medications and allergies have been reviewed. The risks and benefits of the procedure and the sedation options and risks were discussed with the patient.  All questions were answered and informed consent was obtained.      Allergies   Allergen Reactions     Cats      Congestion and drainage     Grass Cough     No Known Allergies         Current Facility-Administered Medications   Medication     0.9% sodium chloride BOLUS     lidocaine (LMX4) kit     lidocaine 1 % 0.1-1 mL     ondansetron (ZOFRAN) injection 4 mg     sodium chloride (PF) 0.9% PF flush 3 mL     sodium chloride (PF) 0.9% PF flush 3 mL     sodium chloride (PF) 0.9% PF flush 3 mL       Patient Active Problem List   Diagnosis     Chiari malformation type I (H)     Hyperlipidemia LDL goal <160     Family history of colon cancer     Elevated fasting glucose     Family history of coronary artery disease     Esophageal reflux     Benign non-nodular prostatic hyperplasia with lower urinary tract symptoms     Personal history of DVT (deep vein thrombosis)     Prediabetes        Past Medical History:   Diagnosis Date     Arthritis 1/2011     Blood clotting disorder (H)      Cancer (H)     Skin     Chiari malformation type I (H)      Elevated fasting glucose 1/19/2012     Headaches, migraine      Headaches, migraine      Other and unspecified hyperlipidemia      Rotator cuff tear 1/22/2012     Rotator cuff tear 1/22/2012     Rotator cuff tendinitis 1/2011    Inj. Dr. Cheney.      Spinal headache 2007    spontaneous  spinal fluid leak - 2 blood patches     Urethral polyp 6-24-10    Dr. Card     Urethral polyp 2010    Dr. Card         Past Surgical History:   Procedure Laterality Date     ABDOMEN SURGERY      Umbilical Hernia repair     BIOPSY      arms cancer, 2 spots     COLONOSCOPY  12/29/10    non-adenomatous polyp. Advised 5 yr f/u.      HC COLONOSCOPY THRU STOMA, DIAGNOSTIC  08    normal. 3 yr f/u due to brother CA age 43.     HC REMOVAL OF TONSILS,<11 Y/O       HC VASECTOMY UNILAT/BILAT W POSTOP SEMEN       HERNIA REPAIR       ORTHOPEDIC SURGERY  2018    Rotator Cuff Repair     SURGICAL HISTORY OF -   2006    umbilical hernia repair with mesh       Social History     Tobacco Use     Smoking status: Never Smoker     Smokeless tobacco: Never Used     Tobacco comment: Some sporadic use 40+ years ago- cigars   Substance Use Topics     Alcohol use: Yes     Frequency: 2-4 times a month     Drinks per session: 1 or 2     Comment: 1-2 drinks a week       Family History   Problem Relation Age of Onset     Diabetes Mother         dx in her 40's. overwt. Now IDDM.     Hypertension Mother      Lipids Mother      Gallbladder Disease Mother      Cancer Father          lung CA with brain mets age 65     Other Cancer Father         Lung     Substance Abuse Father         Alcoholism     Other Cancer Brother         Lung     Cardiovascular Brother         Loi. Brother MI at age 49. Had another stent at age 52     Diabetes Sister      Family History Negative Sister      Allergies Son      Substance Abuse Son      Family History Negative Son      Family History Negative Daughter      Breast Cancer Sister         Dx bilateral breast CA at age 56.      Other Cancer Sister         Lung     Breast Cancer Sister         (pronounced Muh-jay)     Colon Cancer Brother         Diagnosed at age 40s     Other Cancer Brother         Lung, mets to brain     Substance Abuse Son         Alcoholism            Medications:  "    Medications Prior to Admission   Medication Sig Dispense Refill Last Dose     aspirin 81 MG EC tablet Take 81 mg by mouth daily   2/18/2021 at Unknown time     fenofibrate (TRICOR) 145 MG tablet TAKE 1 TABLET(145 MG) BY MOUTH DAILY 90 tablet 2 2/18/2021 at Unknown time     fexofenadine (ALLEGRA) 180 MG tablet Take 180 mg by mouth daily as needed        metFORMIN (GLUCOPHAGE-XR) 500 MG 24 hr tablet TAKE 1 TABLET(500 MG) BY MOUTH DAILY WITH DINNER 90 tablet 0 2/18/2021 at Unknown time     Omega-3 Fatty Acids (FISH OIL PO)         omeprazole (PRILOSEC) 40 MG DR capsule TAKE 1 CAPSULE(40 MG) BY MOUTH DAILY 30 TO 60 MINUTES BEFORE A MEAL 90 capsule 3 2/18/2021 at Unknown time     simvastatin (ZOCOR) 20 MG tablet Take 1 tablet (20 mg) by mouth At Bedtime 90 tablet 1 2/18/2021 at Unknown time       Scheduled Medications:    sodium chloride 0.9%  500 mL Intravenous Once       PRN:  lidocaine 4%, lidocaine (buffered or not buffered), ondansetron, sodium chloride (PF), sodium chloride (PF), sodium chloride (PF)    PHYSICAL EXAM:   /82   Pulse 64   Resp 14   Ht 1.778 m (5' 10\")   Wt 90.7 kg (200 lb)   SpO2 97%   BMI 28.70 kg/m   Estimated body mass index is 28.7 kg/m  as calculated from the following:    Height as of this encounter: 1.778 m (5' 10\").    Weight as of this encounter: 90.7 kg (200 lb).   RESP: lungs clear to auscultation - no rales, rhonchi or wheezes  CV: regular rates and rhythm    IMPRESSION   ASA Class 2 - Mild systemic disease      Signed Electronically by: Broderick Rivera MD  February 19, 2021    .            "

## 2021-02-22 DIAGNOSIS — R73.01 ELEVATED FASTING GLUCOSE: ICD-10-CM

## 2021-02-23 RX ORDER — METFORMIN HCL 500 MG
TABLET, EXTENDED RELEASE 24 HR ORAL
Qty: 90 TABLET | Refills: 0 | Status: SHIPPED | OUTPATIENT
Start: 2021-02-23 | End: 2021-06-15

## 2021-04-08 ENCOUNTER — ANCILLARY PROCEDURE (OUTPATIENT)
Dept: GENERAL RADIOLOGY | Facility: CLINIC | Age: 56
End: 2021-04-08
Attending: FAMILY MEDICINE
Payer: COMMERCIAL

## 2021-04-08 ENCOUNTER — OFFICE VISIT (OUTPATIENT)
Dept: URGENT CARE | Facility: URGENT CARE | Age: 56
End: 2021-04-08
Payer: COMMERCIAL

## 2021-04-08 VITALS
HEART RATE: 80 BPM | DIASTOLIC BLOOD PRESSURE: 82 MMHG | OXYGEN SATURATION: 98 % | RESPIRATION RATE: 20 BRPM | SYSTOLIC BLOOD PRESSURE: 142 MMHG | TEMPERATURE: 97.8 F

## 2021-04-08 DIAGNOSIS — S69.92XA INJURY OF FINGER OF LEFT HAND, INITIAL ENCOUNTER: Primary | ICD-10-CM

## 2021-04-08 DIAGNOSIS — S62.617A CLOSED DISPLACED FRACTURE OF PROXIMAL PHALANX OF LEFT LITTLE FINGER, INITIAL ENCOUNTER: ICD-10-CM

## 2021-04-08 PROCEDURE — 99214 OFFICE O/P EST MOD 30 MIN: CPT | Performed by: FAMILY MEDICINE

## 2021-04-08 PROCEDURE — 73140 X-RAY EXAM OF FINGER(S): CPT | Mod: LT | Performed by: RADIOLOGY

## 2021-04-08 NOTE — PROGRESS NOTES
SUBJECTIVE:  Chief Complaint   Patient presents with     Urgent Care     Musculoskeletal Problem     L pinky poss sprain      Willard Manzano is a 56 year old male presents with a chief complaint of left 5th finger pain, swelling and decreased range of motion.  The injury occurred 6 hour(s) ago *8:30 am)  The injury happened while trying to lift equipment. How: 5th finger got caught in equipment when lifting, ended up getting finger pulled in.  The patient complained of moderate pain  and has had decreased ROM.  Pain exacerbated by movement.  Relieved by nothing.  He treated it initially with no therapy. This is the first time this type of injury has occurred to this patient.     Patient is right handed    Past Medical History:   Diagnosis Date     Arthritis 1/2011     Blood clotting disorder (H)      Cancer (H)     Skin     Chiari malformation type I (H)      Elevated fasting glucose 1/19/2012     Headaches, migraine      Headaches, migraine      Other and unspecified hyperlipidemia      Rotator cuff tear 1/22/2012     Rotator cuff tear 1/22/2012     Rotator cuff tendinitis 1/2011    Inj. Dr. Cheney.      Spinal headache 2007    spontaneous spinal fluid leak - 2 blood patches     Urethral polyp 6-24-10    Dr. Card     Urethral polyp 6/24/2010    Dr. Card      Current Outpatient Medications   Medication Sig Dispense Refill     aspirin 81 MG EC tablet Take 81 mg by mouth daily       fenofibrate (TRICOR) 145 MG tablet TAKE 1 TABLET(145 MG) BY MOUTH DAILY 90 tablet 2     fexofenadine (ALLEGRA) 180 MG tablet Take 180 mg by mouth daily as needed       metFORMIN (GLUCOPHAGE-XR) 500 MG 24 hr tablet TAKE 1 TABLET(500 MG) BY MOUTH DAILY WITH DINNER 90 tablet 0     Omega-3 Fatty Acids (FISH OIL PO)        omeprazole (PRILOSEC) 40 MG DR capsule TAKE 1 CAPSULE(40 MG) BY MOUTH DAILY 30 TO 60 MINUTES BEFORE A MEAL 90 capsule 3     simvastatin (ZOCOR) 20 MG tablet Take 1 tablet (20 mg) by mouth At Bedtime 90 tablet 1     Social  History     Tobacco Use     Smoking status: Never Smoker     Smokeless tobacco: Never Used     Tobacco comment: Some sporadic use 40+ years ago- cigars   Substance Use Topics     Alcohol use: Yes     Frequency: 2-4 times a month     Drinks per session: 1 or 2     Comment: 1-2 drinks a week       ROS:  Review of systems negative except as stated above.    EXAM:   BP (!) 142/82   Pulse 80   Temp 97.8  F (36.6  C) (Tympanic)   Resp 20   SpO2 98%   Gen: healthy,alert,no distress  Extremity: left hand - 5th finger has swelling and tenderness along proximal phalanx, decrease ROM.  Non-tender metacarpal 5th digit   There is not compromise to the distal circulation.  Pulses are +2 and CRT is brisk  GENERAL APPEARANCE: healthy, alert and no distress  EXTREMITIES: peripheral pulses normal  SKIN: no suspicious lesions or rashes  PSYCH: alert, affect bright    X-RAY was done - left 5th finger - base of proximal phalanx with fracture personally viewed by me      ASSESSMENT/PLAN:   (S69.92XA) Injury of finger of left hand, initial encounter  (primary encounter diagnosis)  Comment: fracture  Plan: XR Finger Left G/E 2 Views, [facility]          Referral            (S65.127F) Closed displaced fracture of proximal phalanx of left little finger, initial encounter  Plan: [facility]opedic [facility] Sp[facility]  Referral            Aluminum long finger splint applied to 5th finger, okay to fernanda tape if splint is cumbersome.  Reviewed symptomatic treatment with tylenol, ibuprofen, elevation, ice and rest.  Will follow up on formal Xray report and notify if any abnormalities.  Refer to [facility] for follow up of fracture care.    Follow up with [facility] within 1 week    James Cifuentes MD  April 8, 2021 3:20 PM

## 2021-04-08 NOTE — PATIENT INSTRUCTIONS
Okay to take ibuprofen 200 mg - 4 tablets (800 mg) every 8 hours as needed.  Okay to take tylenol 500 mg - 2 tablets (1000 mg) every 6-8 hours as needed, do not exceed 3000 mg in 24 hours.  Rest, ice, elevate  Use finger splint or buddy tape for 1-2 weeks    Follow up with [facility] within 1 week for fracture care      Patient Education     Broken Finger, Closed  You have a broken finger (fracture). This causes local pain, swelling, and bruising. This injury usually takes about 4 to 6 weeks to heal, but can take longer in some cases. Finger injuries are often treated with a splint or cast, or by taping the injured finger to the next one (buddy taping). This protects the injured finger and holds the bone in position while it heals. More serious fractures may need surgery. This would be done by an orthopedic surgeon. This is a surgeon who specializes in treating bone, muscle, joint, and tendon problems.   If the fingernail has been severely injured, it will probably fall off in 1 to 2 weeks. A new fingernail will usually start to grow back within a month.   Home care  Follow these guidelines when caring for yourself at home:    Keep your hand elevated to reduce pain and swelling. When sitting or lying down, keep your arm above the level of your heart. You can do this by placing your arm on a pillow that rests on your chest or on a pillow at your side. This is most important during the first 2 days (48 hours) after the injury.    Put an ice pack on the injured area. Do this for 20 minutes every 1 to 2 hours the first day for pain relief. You can make an ice pack by wrapping a plastic bag of ice cubes in a thin towel. As the ice melts, be careful that the cast or splint doesn t get wet. Continue using the ice pack 3 to 4 times a day until the pain and swelling go away.    Keep the cast or splint completely dry at all times. Bathe with your cast or splint out of the water. Protect it with a large plastic bag, rubber-banded or  taped at the top end. If a fiberglass cast or splint gets wet, you can dry it with a hair dryer.    If fernanda tape was put on and it becomes wet or dirty, change it. You may replace it with paper, plastic, or cloth tape. Cloth tape and paper tapes must be kept dry. Keep the fernanda tape in place for at least 4 weeks.    You may use acetaminophen or ibuprofen to control pain, unless another pain medicine was prescribed. If you have chronic liver or kidney disease, talk with your healthcare provider before using these medicines. Also talk with your provider if you ve had a stomach ulcer or gastrointestinal bleeding.    Don t put creams or objects under the cast if you have itching.  Follow-up care  Follow up with your healthcare provider, or as advised. This is to make sure the bone is healing the way it should.   X-rays may be taken. You will be told of any new findings that may affect your care.  When to seek medical advice  Call your healthcare provider right away if any of these occur:    The plaster cast or splint becomes wet or soft    The cast or splint cracks    The fiberglass cast or splint stays wet for more than 24 hours    Pain or swelling gets worse    Redness, warmth, swelling, drainage from the wound, or foul odor from a cast or splint    Finger becomes more cold, blue, numb, or tingly    You can t move your finger    The skin around the cast or splint becomes red or swollen    Fever of 100.4 F (38 C) or higher, or as directed by your healthcare provider    Chon Paz last reviewed this educational content on 9/1/2019 2000-2020 [employer]. All rights reserved. This information is not intended as a substitute for professional medical care. Always follow your healthcare professional's instructions.

## 2021-04-14 ENCOUNTER — OFFICE VISIT (OUTPATIENT)
Dept: ORTHOPEDICS | Facility: CLINIC | Age: 56
End: 2021-04-14
Payer: COMMERCIAL

## 2021-04-14 VITALS
HEIGHT: 70 IN | BODY MASS INDEX: 28.63 KG/M2 | DIASTOLIC BLOOD PRESSURE: 74 MMHG | SYSTOLIC BLOOD PRESSURE: 128 MMHG | WEIGHT: 200 LBS

## 2021-04-14 DIAGNOSIS — S62.647A CLOSED NONDISPLACED FRACTURE OF PROXIMAL PHALANX OF LEFT LITTLE FINGER, INITIAL ENCOUNTER: Primary | ICD-10-CM

## 2021-04-14 PROCEDURE — 99204 OFFICE O/P NEW MOD 45 MIN: CPT | Performed by: FAMILY MEDICINE

## 2021-04-14 RX ORDER — MELOXICAM 15 MG/1
15 TABLET ORAL DAILY
Qty: 30 TABLET | Refills: 1 | Status: SHIPPED | OUTPATIENT
Start: 2021-04-14 | End: 2021-06-10

## 2021-04-14 ASSESSMENT — MIFFLIN-ST. JEOR: SCORE: 1733.44

## 2021-04-14 NOTE — PATIENT INSTRUCTIONS
1. Closed nondisplaced fracture of proximal phalanx of left little finger, initial encounter      -Patient has left fifth digit pain and swelling due to an avulsion fracture  -Patient will discontinue finger splint.  Patient will start buddy taping the fifth digit to the fourth digit  -Patient will begin gentle range of motion exercises to regain full range of motion  -Patient will start meloxicam 15 mg daily for the next 1 to 2 weeks and then on an as-needed basis for swelling and pain.  Patient may take 1 to 2 tablets of extra Tylenol as needed for breakthrough pain as well.  -Patient may continue to work as tolerated and limit lifting as his pain allows  -Patient will follow up in 3 weeks for repeat x-rays evaluation and progression of activity  -Call direct clinic number [[phone]] at any time with questions or concerns.    Albert Yeo MD [facility] Orthopedics and Sports Medicine  [facility]

## 2021-04-14 NOTE — PROGRESS NOTES
ASSESSMENT & PLAN  Patient Instructions     1. Closed nondisplaced fracture of proximal phalanx of left little finger, initial encounter      -Patient has left fifth digit pain and swelling due to an avulsion fracture  -Patient will discontinue finger splint.  Patient will start buddy taping the fifth digit to the fourth digit  -Patient will begin gentle range of motion exercises to regain full range of motion  -Patient will start meloxicam 15 mg daily for the next 1 to 2 weeks and then on an as-needed basis for swelling and pain.  Patient may take 1 to 2 tablets of extra Tylenol as needed for breakthrough pain as well.  -Patient may continue to work as tolerated and limit lifting as his pain allows  -Patient will follow up in 3 weeks for repeat x-rays evaluation and progression of activity  -Call direct clinic number [[phone]] at any time with questions or concerns.    Albert Yeo MD [facility] Orthopedics and Sports Medicine  [facility]          -----    SUBJECTIVE  Willard Manzano is a/an 56 year old Right handed male who is seen in consultation at the request of  James Cifuentes M.D. for evaluation of left hand pain. The patient is seen by themselves. Had the J&J the last part of march or beginning of April.    Onset: 6 day(s) ago. Patient describes injury as was lifting something and pinky got caught and twisted. Finger started to swell within the first hour.   Location of Pain: left base of the 5th finger  Rating of Pain at worst: 8/10  Rating of Pain Currently: 2/10  Worsened by: any movement of the pinky, unable to make a fist, spreading of the fingers  Better with: nothing  Treatments tried: ice, previous imaging (xray 4/8/21) and finger splint and buddy tape.  Associated symptoms: swelling, warmth and weakness of hand  Orthopedic history: NO  Relevant surgical history: NO  Social history: social history: works at in [employer].     Past Medical History:   Diagnosis Date     Arthritis  1/2011     Blood clotting disorder (H)      Cancer (H)     Skin     Chiari malformation type I (H)      Elevated fasting glucose 1/19/2012     Headaches, migraine      Headaches, migraine      Other and unspecified hyperlipidemia      Rotator cuff tear 1/22/2012     Rotator cuff tear 1/22/2012     Rotator cuff tendinitis 1/2011    Inj. Dr. Cheney.      Spinal headache 2007    spontaneous spinal fluid leak - 2 blood patches     Urethral polyp 6-24-10    Dr. Card     Urethral polyp 6/24/2010    Dr. Card      Social History     Socioeconomic History     Marital status:      Spouse name: Not on file     Number of children: Not on file     Years of education: Not on file     Highest education level: Not on file   Occupational History     Not on file   Social Needs     Financial resource strain: Not on file     Food insecurity     Worry: Not on file     Inability: Not on file     Transportation needs     Medical: Not on file     Non-medical: Not on file   Tobacco Use     Smoking status: Never Smoker     Smokeless tobacco: Never Used     Tobacco comment: Some sporadic use 40+ years ago- cigars   Substance and Sexual Activity     Alcohol use: Yes     Frequency: 2-4 times a month     Drinks per session: 1 or 2     Comment: 1-2 drinks a week     Drug use: No     Sexual activity: Yes     Partners: Female     Birth control/protection: Male Surgical   Lifestyle     Physical activity     Days per week: Not on file     Minutes per session: Not on file     Stress: Not on file   Relationships     Social connections     Talks on phone: Not on file     Gets together: Not on file     Attends Yarsanism service: Not on file     Active member of club or organization: Not on file     Attends meetings of clubs or organizations: Not on file     Relationship status: Not on file     Intimate partner violence     Fear of current or ex partner: Not on file     Emotionally abused: Not on file     Physically abused: Not on file      "Forced sexual activity: Not on file   Other Topics Concern     Parent/sibling w/ CABG, MI or angioplasty before 65F 55M? Yes     Comment: Brother   Social History Narrative     Not on file         Patient's past medical, surgical, social, and family histories were reviewed today and no changes are noted.    REVIEW OF SYSTEMS:  10 point ROS is negative other than symptoms noted above in HPI, Past Medical History or as stated below  Constitutional: NEGATIVE for fever, chills, change in weight  Skin: NEGATIVE for worrisome rashes, moles or lesions  GI/: NEGATIVE for bowel or bladder changes  Neuro: NEGATIVE for weakness, dizziness or paresthesias    OBJECTIVE:  /74   Ht 1.778 m (5' 10\")   Wt 90.7 kg (200 lb)   BMI 28.70 kg/m     General: healthy, alert and in no distress  HEENT: no scleral icterus or conjunctival erythema  Skin: no suspicious lesions or rash. No jaundice.  CV: regular rhythm by palpation  Resp: normal respiratory effort without conversational dyspnea   Psych: normal mood and affect  Gait: normal steady gait with appropriate coordination and balance  Neuro: normal light touch sensory exam of the bilateral hands.    MSK:  LEFT HAND  Inspection:    No swelling or obvious deformity or asymmetry  Palpation:   Carpals: normal   Metacarpals: normal   Thumb: normal   Fingers: MCP joint 5th digit  Range of Motion:    flexion MCP limited slightly by pain limited by tightness, flexion PIP limited slightly by pain limited by tightness  Strength:    flexion limited slightly by pain  Special Tests:    Positive: laxity to valgus stress of 5th digit    Negative: flexor digitorum superficialis testing, flexor digitorum profundus testing    Independent visualization of the below image:  No results found for this or any previous visit (from the past 24 hour(s)).  XR FINGER LT G/E 2 VW 4/8/2021 3:05 PM      HISTORY: s/p injury of 5th finger, swelling, pain at base of finger;  Injury of finger of left hand, " initial encounter     COMPARISON: None.                                                                      IMPRESSION: Acute avulsion fracture in the radial base of the fifth  finger proximal phalanx at the insertion of the radial collateral  ligament with 2 mm of displacement at the distal cortical margin. No  additional fractures are evident. Degenerative changes in the IP  joints of the fifth finger.     SCOTT S NIELSEN, MD Albert Yeo MD [facility] Sports and Orthopedic Care

## 2021-05-05 ENCOUNTER — OFFICE VISIT (OUTPATIENT)
Dept: ORTHOPEDICS | Facility: CLINIC | Age: 56
End: 2021-05-05
Payer: COMMERCIAL

## 2021-05-05 ENCOUNTER — ANCILLARY PROCEDURE (OUTPATIENT)
Dept: GENERAL RADIOLOGY | Facility: CLINIC | Age: 56
End: 2021-05-05
Attending: FAMILY MEDICINE
Payer: COMMERCIAL

## 2021-05-05 VITALS
BODY MASS INDEX: 28.63 KG/M2 | HEIGHT: 70 IN | SYSTOLIC BLOOD PRESSURE: 128 MMHG | WEIGHT: 200 LBS | DIASTOLIC BLOOD PRESSURE: 72 MMHG

## 2021-05-05 DIAGNOSIS — S62.647G CLOSED NONDISPLACED FRACTURE OF PROXIMAL PHALANX OF LEFT LITTLE FINGER WITH DELAYED HEALING, SUBSEQUENT ENCOUNTER: ICD-10-CM

## 2021-05-05 DIAGNOSIS — M79.646 FINGER PAIN: ICD-10-CM

## 2021-05-05 DIAGNOSIS — M79.645 PAIN OF FINGER OF LEFT HAND: Primary | ICD-10-CM

## 2021-05-05 PROCEDURE — 73140 X-RAY EXAM OF FINGER(S): CPT | Mod: LT | Performed by: FAMILY MEDICINE

## 2021-05-05 PROCEDURE — 99213 OFFICE O/P EST LOW 20 MIN: CPT | Performed by: FAMILY MEDICINE

## 2021-05-05 ASSESSMENT — MIFFLIN-ST. JEOR: SCORE: 1733.44

## 2021-05-05 NOTE — LETTER
5/5/2021         RE: Willard Manzano  [address] [place] [id number]        Dear Colleague,    Thank you for referring your patient, Willard Manzano, to the [facility] [place]. Please see a copy of my visit note below.    ASSESSMENT & PLAN  Patient Instructions     1. Pain of finger of left hand    2. Closed nondisplaced fracture of proximal phalanx of left little finger with delayed healing, subsequent encounter      -Patient is following up for left fifth MCP joint pain due to minimally displaced avulsion fracture of the fifth proximal phalanx.  -X-rays taken office today appears virtually unchanged compared to previous x-ray.  Patient has a small avulsion fracture at the base of the proximal phalanx at the MCP joint.  -Patient has regained full strength and almost full range of motion of the fifth digit.  -Patient will continue with home exercises  -Patient may continue to work as her pain allows.  -Pain should resolve over the next 4-6 weeks  -Patient will return to clinic if he reinjures his finger.  -Call direct clinic number [[phone]] at any time with questions or concerns.    Albert Yeo MD [facility] Orthopedics and Sports Medicine  [facility] [place]          -----    SUBJECTIVE:  Willard Manzano is a 56 year old male who is seen in follow-up for left fifth digit pain and swelling due to an avulsion fracture.They were last seen 4/14/2021.     Since their last visit reports 50% improvement. Still is sore with bending and unable to make a full fist.  They indicate that their current pain level is 5/10. They have tried ice, other medications: meloxicam and home exercises.      The patient is seen by themselves.    Patient's past medical, surgical, social, and family histories were reviewed today and no changes are noted.    REVIEW OF SYSTEMS:  Constitutional: NEGATIVE for fever, chills, change in weight  Skin: NEGATIVE for worrisome rashes, moles or  "lesions  GI/: NEGATIVE for bowel or bladder changes  Neuro: NEGATIVE for weakness, dizziness or paresthesias    OBJECTIVE:  /72   Ht 1.778 m (5' 10\")   Wt 90.7 kg (200 lb)   BMI 28.70 kg/m     General: healthy, alert and in no distress  HEENT: no scleral icterus or conjunctival erythema  Skin: no suspicious lesions or rash. No jaundice.  CV: regular rhythm by palpation, no pedal edema  Resp: normal respiratory effort without conversational dyspnea   Psych: normal mood and affect  Gait: normal steady gait with appropriate coordination and balance  Neuro: normal light touch sensory exam of the extremities.    MSK:  LEFT HAND  Inspection:    No swelling or obvious deformity or asymmetry  Palpation:   Carpals: normal   Metacarpals: normal   Thumb: normal   Fingers: MCP joint 5th digit  Range of Motion:    flexion MCP limited slightly by pain limited by tightness, flexion PIP limited slightly by pain limited by tightness  Strength:    grossly intact  Special Tests:    Positive: laxity to valgus stress of 5th digit (mild)    Negative: flexor digitorum superficialis testing, flexor digitorum profundus testing    Independent visualization of the below image:  Recent Results (from the past 24 hour(s))   XR Finger LT G/E 2 vw    Narrative    Minimally displaced small avulsion fracture the base of the fifth proximal   phalanx that appears virtually unchanged compared to previous x-ray   performed on 4/8/2021.         Patient's conditions were thoroughly discussed during today's visit with greater than 50% of the visit spent counseling the patient with total time spent face-to-face with the patient being 20 minutes.    Albert Yeo MD, [facility] Sports and Orthopedic Care            Again, thank you for allowing me to participate in the care of your patient.        Sincerely,        Albert Yeo, MD    "

## 2021-05-05 NOTE — PROGRESS NOTES
"ASSESSMENT & PLAN  Patient Instructions     1. Pain of finger of left hand    2. Closed nondisplaced fracture of proximal phalanx of left little finger with delayed healing, subsequent encounter      -Patient is following up for left fifth MCP joint pain due to minimally displaced avulsion fracture of the fifth proximal phalanx.  -X-rays taken office today appears virtually unchanged compared to previous x-ray.  Patient has a small avulsion fracture at the base of the proximal phalanx at the MCP joint.  -Patient has regained full strength and almost full range of motion of the fifth digit.  -Patient will continue with home exercises  -Patient may continue to work as her pain allows.  -Pain should resolve over the next 4-6 weeks  -Patient will return to clinic if he reinjures his finger.  -Call direct clinic number [[phone]] at any time with questions or concerns.    Albert Yeo MD [facility] Orthopedics and Sports Medicine  [facility]          -----    SUBJECTIVE:  Willard Manzano is a 56 year old male who is seen in follow-up for left fifth digit pain and swelling due to an avulsion fracture.They were last seen 4/14/2021.     Since their last visit reports 50% improvement. Still is sore with bending and unable to make a full fist.  They indicate that their current pain level is 5/10. They have tried ice, other medications: meloxicam and home exercises.      The patient is seen by themselves.    Patient's past medical, surgical, social, and family histories were reviewed today and no changes are noted.    REVIEW OF SYSTEMS:  Constitutional: NEGATIVE for fever, chills, change in weight  Skin: NEGATIVE for worrisome rashes, moles or lesions  GI/: NEGATIVE for bowel or bladder changes  Neuro: NEGATIVE for weakness, dizziness or paresthesias    OBJECTIVE:  /72   Ht 1.778 m (5' 10\")   Wt 90.7 kg (200 lb)   BMI 28.70 kg/m     General: healthy, alert and in no distress  HEENT: no scleral " icterus or conjunctival erythema  Skin: no suspicious lesions or rash. No jaundice.  CV: regular rhythm by palpation, no pedal edema  Resp: normal respiratory effort without conversational dyspnea   Psych: normal mood and affect  Gait: normal steady gait with appropriate coordination and balance  Neuro: normal light touch sensory exam of the extremities.    MSK:  LEFT HAND  Inspection:    No swelling or obvious deformity or asymmetry  Palpation:   Carpals: normal   Metacarpals: normal   Thumb: normal   Fingers: MCP joint 5th digit  Range of Motion:    flexion MCP limited slightly by pain limited by tightness, flexion PIP limited slightly by pain limited by tightness  Strength:    grossly intact  Special Tests:    Positive: laxity to valgus stress of 5th digit (mild)    Negative: flexor digitorum superficialis testing, flexor digitorum profundus testing    Independent visualization of the below image:  Recent Results (from the past 24 hour(s))   XR Finger LT G/E 2 vw    Narrative    Minimally displaced small avulsion fracture the base of the fifth proximal   phalanx that appears virtually unchanged compared to previous x-ray   performed on 4/8/2021.         Patient's conditions were thoroughly discussed during today's visit with greater than 50% of the visit spent counseling the patient with total time spent face-to-face with the patient being 20 minutes.    Albert Yeo MD, [facility] Sports and Orthopedic [facility]

## 2021-05-05 NOTE — PATIENT INSTRUCTIONS
1. Pain of finger of left hand    2. Closed nondisplaced fracture of proximal phalanx of left little finger with delayed healing, subsequent encounter      -Patient is following up for left fifth MCP joint pain due to minimally displaced avulsion fracture of the fifth proximal phalanx.  -X-rays taken office today appears virtually unchanged compared to previous x-ray.  Patient has a small avulsion fracture at the base of the proximal phalanx at the MCP joint.  -Patient has regained full strength and almost full range of motion of the fifth digit.  -Patient will continue with home exercises  -Patient may continue to work as her pain allows.  -Pain should resolve over the next 4-6 weeks  -Patient will return to clinic if he reinjures his finger.  -Call direct clinic number [[phone]] at any time with questions or concerns.    Albert Yeo MD [facility] Orthopedics and Sports Medicine  [facility] [place]

## 2021-05-25 ENCOUNTER — OFFICE VISIT (OUTPATIENT)
Dept: URGENT CARE | Facility: URGENT CARE | Age: 56
End: 2021-05-25
Payer: COMMERCIAL

## 2021-05-25 ENCOUNTER — HOSPITAL ENCOUNTER (OUTPATIENT)
Dept: ULTRASOUND IMAGING | Facility: CLINIC | Age: 56
Discharge: HOME | End: 2021-05-25
Attending: FAMILY MEDICINE | Admitting: FAMILY MEDICINE
Payer: COMMERCIAL

## 2021-05-25 ENCOUNTER — ANCILLARY PROCEDURE (OUTPATIENT)
Dept: GENERAL RADIOLOGY | Facility: CLINIC | Age: 56
End: 2021-05-25
Attending: PHYSICIAN ASSISTANT
Payer: COMMERCIAL

## 2021-05-25 ENCOUNTER — OFFICE VISIT (OUTPATIENT)
Dept: PEDIATRICS | Facility: CLINIC | Age: 56
End: 2021-05-25
Attending: PHYSICIAN ASSISTANT
Payer: COMMERCIAL

## 2021-05-25 VITALS
TEMPERATURE: 97.8 F | HEART RATE: 72 BPM | OXYGEN SATURATION: 95 % | WEIGHT: 207 LBS | BODY MASS INDEX: 29.7 KG/M2 | DIASTOLIC BLOOD PRESSURE: 79 MMHG | RESPIRATION RATE: 18 BRPM | SYSTOLIC BLOOD PRESSURE: 127 MMHG

## 2021-05-25 VITALS
DIASTOLIC BLOOD PRESSURE: 72 MMHG | TEMPERATURE: 97.1 F | HEART RATE: 75 BPM | BODY MASS INDEX: 29.7 KG/M2 | SYSTOLIC BLOOD PRESSURE: 132 MMHG | OXYGEN SATURATION: 96 % | WEIGHT: 207 LBS

## 2021-05-25 DIAGNOSIS — M79.661 PAIN AND SWELLING OF RIGHT LOWER LEG: ICD-10-CM

## 2021-05-25 DIAGNOSIS — M79.89 PAIN AND SWELLING OF RIGHT LOWER LEG: Primary | ICD-10-CM

## 2021-05-25 DIAGNOSIS — M79.89 PAIN AND SWELLING OF RIGHT LOWER LEG: ICD-10-CM

## 2021-05-25 DIAGNOSIS — M79.89 LEG SWELLING: Primary | ICD-10-CM

## 2021-05-25 DIAGNOSIS — R23.3 ABNORMAL BRUISING: ICD-10-CM

## 2021-05-25 DIAGNOSIS — M79.661 PAIN AND SWELLING OF RIGHT LOWER LEG: Primary | ICD-10-CM

## 2021-05-25 PROCEDURE — 73590 X-RAY EXAM OF LOWER LEG: CPT | Mod: RT | Performed by: RADIOLOGY

## 2021-05-25 PROCEDURE — 99207 REFERRAL TO ACUTE AND DIAGNOSTIC SERVICES: CPT | Performed by: PHYSICIAN ASSISTANT

## 2021-05-25 PROCEDURE — 93971 EXTREMITY STUDY: CPT | Mod: RT

## 2021-05-25 PROCEDURE — 99215 OFFICE O/P EST HI 40 MIN: CPT | Performed by: FAMILY MEDICINE

## 2021-05-25 NOTE — PROGRESS NOTES
Assessment & Plan     Pain and swelling of right lower leg  - Referral to Acute and Diagnostic Services (Day of diagnostic / First order acute)  - US Lower Extremity Venous Duplex Right     Xray without evidence of fracture per my read  Negative for DVT on ultrasound      Based on presentation and fluid collection seen on imaging presumed minor trauma was sustained to the area. Recommended to elevate leg and monitor for worsening swelling return if so. OTC analgesics as needed      Johnathan Junior MD   [place] [facility]    Guanako Lamar is a 56 year old male who presents to clinic today for the following health issues:  Chief Complaint   Patient presents with     Musculoskeletal Problem     Right leg pain X 1 week     HPI    Patient is referred to us today from [place] urgent care for further evaluation-    Reports right leg swelling and abnormal bruising. Symptoms started in the last week.     Denies personal or FH of blood clots  No recent travel  No leg injuries or extended period of immobilization    No reported SOB or PND    Non smoker  no hx of cancer    Patient Active Problem List    Diagnosis Date Noted     Prediabetes 11/16/2020     Priority: Medium     Personal history of DVT (deep vein thrombosis) 12/26/2018     Priority: Medium     Benign non-nodular prostatic hyperplasia with lower urinary tract symptoms 12/30/2015     Priority: Medium     Esophageal reflux 08/20/2014     Priority: Medium     Family history of coronary artery disease 08/15/2013     Priority: Medium     Elevated fasting glucose 01/19/2012     Priority: Medium     Family history of colon cancer 11/23/2010     Priority: Medium     Hyperlipidemia LDL goal <160 10/31/2010     Priority: Medium     Chiari malformation type I (H)      Priority: Medium     Diagnosed in 2007         Current Outpatient Medications   Medication     aspirin 81 MG EC tablet     fenofibrate (TRICOR) 145 MG tablet     fexofenadine (ALLEGRA) 180 MG tablet      meloxicam (MOBIC) 15 MG tablet     metFORMIN (GLUCOPHAGE-XR) 500 MG 24 hr tablet     Omega-3 Fatty Acids (FISH OIL PO)     omeprazole (PRILOSEC) 40 MG DR capsule     simvastatin (ZOCOR) 20 MG tablet     No current facility-administered medications for this visit.            Objective    /79 (BP Location: Right arm, Patient Position: Chair, Cuff Size: Adult Large)   Pulse 72   Temp 97.8  F (36.6  C) (Oral)   Resp 18   Wt 93.9 kg (207 lb)   SpO2 95%   BMI 29.70 kg/m    Physical Exam   L leg: no edema  R leg: trace to 1+ edema of lower extremity, anterior leg scattered bruising, carol's negative  Gait: antalgic favoring left    Results for orders placed or performed during the hospital encounter of 05/25/21   US Lower Extremity Venous Duplex Right     Status: None    Narrative    EXAM: US LOWER EXTREMITY VENOUS DUPLEX RIGHT  LOCATION: [facility]  DATE/TIME: 5/25/2021 4:13 PM    INDICATION: Pain and swelling.  COMPARISON: None.  TECHNIQUE: Venous Duplex ultrasound of the right lower extremity with and without compression, augmentation and duplex. Color flow and spectral Doppler with waveform analysis performed.    FINDINGS: Exam includes the common femoral, femoral, popliteal, and contralateral common femoral veins as well as segmentally visualized deep calf veins and greater saphenous vein.     RIGHT: No deep vein thrombosis. No superficial thrombophlebitis. No popliteal cyst.      Impression    IMPRESSION:  1.  No deep venous thrombosis in the right lower extremity. There is a 4.2 x 3.8 x 0.5 cm linear simple appearing fluid collection in the right calf. This is likely a seroma.   Results for orders placed or performed in visit on 05/25/21   XR Tibia & Fibula Right 2 Views     Status: None    Narrative    XR TIBIA & FIBULA RT 2 VW 5/25/2021 3:32 PM     HISTORY: Pain and swelling of right lower leg; Pain and swelling of  right lower leg; Abnormal bruising      Impression    IMPRESSION:  Negative exam.    FIORDALIZA CID MD                     The use of Dragon/[employer] dictation services may have been used to construct the content in this note; any grammatical or spelling errors are non-intentional. Please contact the author of this note directly if you are in need of any clarification.

## 2021-05-25 NOTE — PROGRESS NOTES
"    Assessment & Plan     Pain and swelling of right lower leg  Xray Negative for acute findings, read by Dez RAE at time of visit.  Patient referred to the [facility] center for ultrasound to rule out DVT  - Referral to Acute and Diagnostic Services (Day of diagnostic / First order acute); Future  - XR Tibia & Fibula Right 2 Views    Abnormal bruising  Leg swelling with bruising  Concern for DVT  Referral to the [facility]   - XR Tibia & Fibula Right 2 Views       BMI:   Estimated body mass index is 29.7 kg/m  as calculated from the following:    Height as of 5/5/21: 1.778 m (5' 10\").    Weight as of this encounter: 93.9 kg (207 lb).       CONSULTATION/REFERRAL to [facility]      Dez Prince PA-C  [facility] BINA Lamar is a 56 year old who presents for the following health issues     HPI     Right leg swelling  Abnormal bruising  Warm swollen leg    Review of Systems   Constitutional, HEENT, cardiovascular, pulmonary, GI, , musculoskeletal, neuro, skin, endocrine and psych systems are negative, except as otherwise noted.      Objective    /72   Pulse 75   Temp 97.1  F (36.2  C) (Tympanic)   Wt 93.9 kg (207 lb)   SpO2 96%   BMI 29.70 kg/m    Body mass index is 29.7 kg/m .  Physical Exam   GENERAL: healthy, alert and no distress  RESP: lungs clear to auscultation - no rales, rhonchi or wheezes  CV: regular rate and rhythm, normal S1 S2, no S3 or S4, no murmur, click or rub, no peripheral edema and peripheral pulses strong  ABDOMEN: soft, nontender, no hepatosplenomegaly, no masses and bowel sounds normal  MS: Positive for right lower leg swelling, tightness of lower leg  SKIN: Positive for abnormal bruising right lower leg  NEURO: Normal strength and tone, mentation intact and speech normal    Xray - Reviewed and interpreted by me.  Negative for acute findings, read by Dez RAE at time of visit.              "

## 2021-06-10 ENCOUNTER — ANCILLARY PROCEDURE (OUTPATIENT)
Dept: GENERAL RADIOLOGY | Facility: CLINIC | Age: 56
End: 2021-06-10
Attending: FAMILY MEDICINE
Payer: COMMERCIAL

## 2021-06-10 ENCOUNTER — OFFICE VISIT (OUTPATIENT)
Dept: FAMILY MEDICINE | Facility: CLINIC | Age: 56
End: 2021-06-10
Payer: COMMERCIAL

## 2021-06-10 VITALS
HEIGHT: 70 IN | WEIGHT: 208 LBS | BODY MASS INDEX: 29.78 KG/M2 | SYSTOLIC BLOOD PRESSURE: 124 MMHG | TEMPERATURE: 98.7 F | DIASTOLIC BLOOD PRESSURE: 78 MMHG | RESPIRATION RATE: 16 BRPM | OXYGEN SATURATION: 99 % | HEART RATE: 80 BPM

## 2021-06-10 DIAGNOSIS — M25.561 PAIN IN BOTH KNEES, UNSPECIFIED CHRONICITY: Primary | ICD-10-CM

## 2021-06-10 DIAGNOSIS — M25.562 PAIN IN BOTH KNEES, UNSPECIFIED CHRONICITY: Primary | ICD-10-CM

## 2021-06-10 DIAGNOSIS — R60.0 PEDAL EDEMA: ICD-10-CM

## 2021-06-10 DIAGNOSIS — I83.93 ASYMPTOMATIC VARICOSE VEINS OF BOTH LOWER EXTREMITIES: ICD-10-CM

## 2021-06-10 LAB — ERYTHROCYTE [SEDIMENTATION RATE] IN BLOOD BY WESTERGREN METHOD: 6 MM/H (ref 0–20)

## 2021-06-10 PROCEDURE — 84550 ASSAY OF BLOOD/URIC ACID: CPT | Performed by: FAMILY MEDICINE

## 2021-06-10 PROCEDURE — 86431 RHEUMATOID FACTOR QUANT: CPT | Performed by: FAMILY MEDICINE

## 2021-06-10 PROCEDURE — 99214 OFFICE O/P EST MOD 30 MIN: CPT | Performed by: FAMILY MEDICINE

## 2021-06-10 PROCEDURE — 36415 COLL VENOUS BLD VENIPUNCTURE: CPT | Performed by: FAMILY MEDICINE

## 2021-06-10 PROCEDURE — 86038 ANTINUCLEAR ANTIBODIES: CPT | Performed by: FAMILY MEDICINE

## 2021-06-10 PROCEDURE — 85652 RBC SED RATE AUTOMATED: CPT | Performed by: FAMILY MEDICINE

## 2021-06-10 PROCEDURE — 73565 X-RAY EXAM OF KNEES: CPT | Performed by: RADIOLOGY

## 2021-06-10 RX ORDER — MELOXICAM 15 MG/1
15 TABLET ORAL DAILY
Qty: 90 TABLET | Refills: 0 | Status: SHIPPED | OUTPATIENT
Start: 2021-06-10 | End: 2021-09-24

## 2021-06-10 ASSESSMENT — MIFFLIN-ST. JEOR: SCORE: 1769.73

## 2021-06-10 NOTE — ASSESSMENT & PLAN NOTE
GFR Estimate   Date Value Ref Range Status   12/07/2020 72 >60 mL/min/[1.73_m2] Final     Comment:     Non  GFR Calc  Starting 12/18/2018, serum creatinine based estimated GFR (eGFR) will be   calculated using the Chronic Kidney Disease Epidemiology Collaboration   (CKD-EPI) equation.     Normal ejection fraction 2015 NM.  Suspect varicose veins.  Recent ultrasound negative for DVT, positive for seroma in right calf.  Discussed.  Knee-high socks

## 2021-06-10 NOTE — ASSESSMENT & PLAN NOTE
Right more than left.  Stable to exam.  X-rays with trivial OA.  NSAID.  Discussed PT.  Discussed natural history, additional in interventions.  Topical diclofenac

## 2021-06-10 NOTE — PROGRESS NOTES
Guanako Lamar is a 56 year old who presents for the following health issues     HPI     Musculoskeletal problem/pain  Onset/Duration: 2 weeks  Description   Location: knee - left  Joint Swelling: YES  Redness: no  Pain: YES  Warmth: YES  Intensity:  moderate  Progression of Symptoms:  same  Accompanying signs and symptoms:   Fevers: no  Numbness/tingling/weakness: YES  History  Trauma to the area: no  Recent illness:  no  Previous similar problem: no  Previous evaluation:  no  Precipitating or alleviating factors:  Aggravating factors include: kneeling  Therapies tried and outcome: ice and acetaminophen    Patient reports bruise developing suddenly in right calf with swelling and pain.  He was evaluated for DVT, where a cystic collection in his calf was found but no DVT.  Since that time, his knees hurt especially when kneeling.  Right more than left.  He also had discomfort in his right axilla for a day or 2, but that has faded.  Past Medical History:   Diagnosis Date     Arthritis 1/2011     Blood clotting disorder (H)      Cancer (H)     Skin     Chiari malformation type I (H)      Elevated fasting glucose 1/19/2012     Headaches, migraine      Headaches, migraine      Other and unspecified hyperlipidemia      Pedal edema 6/10/2021     Rotator cuff tear 1/22/2012     Rotator cuff tear 1/22/2012     Rotator cuff tendinitis 1/2011    Inj. Dr. Cheney.      Spinal headache 2007    spontaneous spinal fluid leak - 2 blood patches     Urethral polyp 6-24-10    Dr. Card     Urethral polyp 6/24/2010    Dr. Card      Varicose veins of both lower extremities 6/10/2021       Past Surgical History:   Procedure Laterality Date     ABDOMEN SURGERY  2006    Umbilical Hernia repair     BIOPSY      arms cancer, 2 spots     COLONOSCOPY  12/29/10    non-adenomatous polyp. Advised 5 yr f/u.      COLONOSCOPY N/A 2/19/2021    Procedure: COLONOSCOPY;  Surgeon: Broderick Rivera MD;  Location:  GI      COLONOSCOPY  "THRU STOMA, DIAGNOSTIC  08    normal. 3 yr f/u due to brother CA age 43.     HC REMOVAL OF TONSILS,<13 Y/O       HC VASECTOMY UNILAT/BILAT W POSTOP SEMEN       HERNIA REPAIR       ORTHOPEDIC SURGERY  2018    Rotator Cuff Repair     SURGICAL HISTORY OF -   2006    umbilical hernia repair with mesh       Family History   Problem Relation Age of Onset     Diabetes Mother         dx in her 40's. overwt. Now IDDM.     Hypertension Mother      Lipids Mother      Gallbladder Disease Mother      Cancer Father          lung CA with brain mets age 65     Other Cancer Father         Lung     Substance Abuse Father         Alcoholism     Other Cancer Brother         Lung     Cardiovascular Brother         Loi. Brother MI at age 49. Had another stent at age 52     Diabetes Sister      Family History Negative Sister      Allergies Son      Substance Abuse Son      Family History Negative Son      Family History Negative Daughter      Breast Cancer Sister         Dx bilateral breast CA at age 56.      Other Cancer Sister         Lung     Breast Cancer Sister         (pronounced Muh-jay)     Colon Cancer Brother         Diagnosed at age 40s     Other Cancer Brother         Lung, mets to brain     Substance Abuse Son         Alcoholism       Social History     Tobacco Use     Smoking status: Never Smoker     Smokeless tobacco: Never Used     Tobacco comment: Some sporadic use 40+ years ago- cigars   Substance Use Topics     Alcohol use: Yes     Frequency: 2-4 times a month     Drinks per session: 1 or 2     Comment: 1-2 drinks a week         Review of Systems     No fevers no trauma.  He feels as if his left knee could \"give out\".  One finger is sore        Objective    /78 (BP Location: Right arm, Patient Position: Chair, Cuff Size: Adult Large)   Pulse 80   Temp 98.7  F (37.1  C) (Oral)   Resp 16   Ht 1.778 m (5' 10\")   Wt 94.3 kg (208 lb)   SpO2 99%   BMI 29.84 kg/m    Body mass index is 29.84 " kg/m .  Physical Exam     Axilla without mass adenopathy nor tenderness  Knees bilaterally with mild tenderness to patellar compression mild tenderness at the anserine bursa.  No palpable synovitis nor warmth.  No ligamentous instability  1+ edema bilaterally with sock line.  No cords negative Homans  Hands without synovitis or arthritic nodularity    X-rays show mild asymmetry of compartment space          Ravi Rahman MD

## 2021-06-11 DIAGNOSIS — R73.01 ELEVATED FASTING GLUCOSE: ICD-10-CM

## 2021-06-11 LAB — URATE SERPL-MCNC: 4.6 MG/DL (ref 3.5–7.2)

## 2021-06-14 LAB
ANA SER QL IF: NEGATIVE
RHEUMATOID FACT SER NEPH-ACNC: <7 IU/ML (ref 0–20)

## 2021-06-14 NOTE — TELEPHONE ENCOUNTER
Routing refill request to provider for review/approval because:  Susanna given x1 and patient did not follow up, please advise  Labs out of range:  A1c    Marika Carr RN on 6/14/2021 at 11:23 AM

## 2021-06-15 RX ORDER — METFORMIN HCL 500 MG
TABLET, EXTENDED RELEASE 24 HR ORAL
Qty: 90 TABLET | Refills: 0 | Status: SHIPPED | OUTPATIENT
Start: 2021-06-15 | End: 2021-09-24

## 2021-06-25 DIAGNOSIS — E78.5 HYPERLIPIDEMIA LDL GOAL <130: ICD-10-CM

## 2021-06-25 RX ORDER — SIMVASTATIN 20 MG
TABLET ORAL
Qty: 90 TABLET | Refills: 0 | Status: SHIPPED | OUTPATIENT
Start: 2021-06-25 | End: 2021-09-27

## 2021-06-25 NOTE — TELEPHONE ENCOUNTER
3 month sharmin refill sent.  Was to have 3 month FLP around 3/7/21 per Dr. Rahman.  Has lab order.  Please call to schedule fasting lab appt.  Adeline Still RN

## 2021-08-17 ENCOUNTER — OFFICE VISIT (OUTPATIENT)
Dept: FAMILY MEDICINE | Facility: CLINIC | Age: 56
End: 2021-08-17
Payer: COMMERCIAL

## 2021-08-17 VITALS
WEIGHT: 207 LBS | RESPIRATION RATE: 16 BRPM | OXYGEN SATURATION: 95 % | HEART RATE: 91 BPM | SYSTOLIC BLOOD PRESSURE: 115 MMHG | DIASTOLIC BLOOD PRESSURE: 80 MMHG | BODY MASS INDEX: 29.7 KG/M2

## 2021-08-17 DIAGNOSIS — E78.5 HYPERLIPIDEMIA LDL GOAL <130: ICD-10-CM

## 2021-08-17 DIAGNOSIS — M25.561 PAIN IN BOTH KNEES, UNSPECIFIED CHRONICITY: Primary | ICD-10-CM

## 2021-08-17 DIAGNOSIS — M25.562 PAIN IN BOTH KNEES, UNSPECIFIED CHRONICITY: Primary | ICD-10-CM

## 2021-08-17 LAB
CHOLEST SERPL-MCNC: 191 MG/DL
FASTING STATUS PATIENT QL REPORTED: YES
HDLC SERPL-MCNC: 39 MG/DL
LDLC SERPL CALC-MCNC: 127 MG/DL
NONHDLC SERPL-MCNC: 152 MG/DL
TRIGL SERPL-MCNC: 123 MG/DL

## 2021-08-17 PROCEDURE — 36415 COLL VENOUS BLD VENIPUNCTURE: CPT | Performed by: PHYSICIAN ASSISTANT

## 2021-08-17 PROCEDURE — 80061 LIPID PANEL: CPT | Performed by: PHYSICIAN ASSISTANT

## 2021-08-17 PROCEDURE — 99213 OFFICE O/P EST LOW 20 MIN: CPT | Performed by: PHYSICIAN ASSISTANT

## 2021-08-17 NOTE — PROGRESS NOTES
"    Assessment & Plan     Pain in both knees, unspecified chronicity  Using mobic daily  - Orthopedic  Referral; Future    Hyperlipidemia LDL goal <130  Taking meds, tolerating well  - Lipid panel reflex to direct LDL Fasting             BMI:   Estimated body mass index is 29.7 kg/m  as calculated from the following:    Height as of 6/10/21: 1.778 m (5' 10\").    Weight as of this encounter: 93.9 kg (207 lb).           No follow-ups on file.    SHAWN [facility] ELIZABETH Lamar is a 56 year old who presents for the following health issues     History of Present Illness       He eats 0-1 servings of fruits and vegetables daily.He consumes 0 sweetened beverage(s) daily.He exercises with enough effort to increase his heart rate 30 to 60 minutes per day.  He exercises with enough effort to increase his heart rate 3 or less days per week. He is missing 1 dose(s) of medications per week.  He is not taking prescribed medications regularly due to other.       Musculoskeletal problem/pain  Onset/Duration: [id number]  Description  Location: knee - left  Joint Swelling: sometimes  Redness: no  Pain: YES- 7 to 8  Warmth: YES- The was. Still feels a little warm to the touch   Intensity:  8/10  Progression of Symptoms:  worsening and same  Accompanying signs and symptoms:   Fevers: no  Numbness/tingling/weakness: YES- Weakness  History  Trauma to the area: no  Recent illness:  no  Previous similar problem: no  Previous evaluation:  YES- Xray  Precipitating or alleviating factors:  Aggravating factors include: Bending, kneeling down and going down steps   Therapies tried and outcome: ice        Review of Systems   Constitutional, HEENT, cardiovascular, pulmonary, gi and gu systems are negative, except as otherwise noted.      Objective    /80 (BP Location: Right arm, Patient Position: Sitting, Cuff Size: Adult Large)   Pulse 91   Resp 16   Wt 93.9 kg (207 lb)   SpO2 " 95%   BMI 29.70 kg/m    There is no height or weight on file to calculate BMI.  Physical Exam   GENERAL APPEARANCE: healthy, alert and no distress  ORTHO: Knee Exam: Inspection: AP/lateral alignment normal  Tender: lateral joint line, medial joint line  Non-tender: patella tendon  Active Range of Motion: Patellofemoral crepitus with extension

## 2021-08-31 ENCOUNTER — ANCILLARY PROCEDURE (OUTPATIENT)
Dept: GENERAL RADIOLOGY | Facility: CLINIC | Age: 56
End: 2021-08-31
Attending: STUDENT IN AN ORGANIZED HEALTH CARE EDUCATION/TRAINING PROGRAM
Payer: COMMERCIAL

## 2021-08-31 ENCOUNTER — OFFICE VISIT (OUTPATIENT)
Dept: ORTHOPEDICS | Facility: CLINIC | Age: 56
End: 2021-08-31
Attending: PHYSICIAN ASSISTANT
Payer: COMMERCIAL

## 2021-08-31 VITALS
BODY MASS INDEX: 29.63 KG/M2 | SYSTOLIC BLOOD PRESSURE: 112 MMHG | WEIGHT: 207 LBS | HEIGHT: 70 IN | DIASTOLIC BLOOD PRESSURE: 78 MMHG

## 2021-08-31 DIAGNOSIS — M25.561 PAIN IN BOTH KNEES, UNSPECIFIED CHRONICITY: ICD-10-CM

## 2021-08-31 DIAGNOSIS — M17.12 PRIMARY OSTEOARTHRITIS OF LEFT KNEE: Primary | ICD-10-CM

## 2021-08-31 DIAGNOSIS — M25.562 PAIN IN BOTH KNEES, UNSPECIFIED CHRONICITY: ICD-10-CM

## 2021-08-31 DIAGNOSIS — M17.12 PRIMARY OSTEOARTHRITIS OF LEFT KNEE: ICD-10-CM

## 2021-08-31 PROCEDURE — 99203 OFFICE O/P NEW LOW 30 MIN: CPT | Mod: 25 | Performed by: STUDENT IN AN ORGANIZED HEALTH CARE EDUCATION/TRAINING PROGRAM

## 2021-08-31 PROCEDURE — 73562 X-RAY EXAM OF KNEE 3: CPT | Performed by: RADIOLOGY

## 2021-08-31 PROCEDURE — 20610 DRAIN/INJ JOINT/BURSA W/O US: CPT | Mod: LT | Performed by: STUDENT IN AN ORGANIZED HEALTH CARE EDUCATION/TRAINING PROGRAM

## 2021-08-31 RX ORDER — TRIAMCINOLONE ACETONIDE 40 MG/ML
40 INJECTION, SUSPENSION INTRA-ARTICULAR; INTRAMUSCULAR
Status: DISCONTINUED | OUTPATIENT
Start: 2021-08-31 | End: 2022-05-18

## 2021-08-31 RX ORDER — LIDOCAINE HYDROCHLORIDE 10 MG/ML
1 INJECTION, SOLUTION INFILTRATION; PERINEURAL
Status: DISCONTINUED | OUTPATIENT
Start: 2021-08-31 | End: 2022-05-18

## 2021-08-31 RX ADMIN — LIDOCAINE HYDROCHLORIDE 1 ML: 10 INJECTION, SOLUTION INFILTRATION; PERINEURAL at 16:29

## 2021-08-31 RX ADMIN — TRIAMCINOLONE ACETONIDE 40 MG: 40 INJECTION, SUSPENSION INTRA-ARTICULAR; INTRAMUSCULAR at 16:29

## 2021-08-31 ASSESSMENT — KOOS JR
STRAIGHTENING KNEE FULLY: MODERATE
GOING UP OR DOWN STAIRS: MODERATE
RISING FROM SITTING: MODERATE
STANDING UPRIGHT: MODERATE
BENDING TO THE FLOOR TO PICK UP OBJECT: SEVERE
HOW SEVERE IS YOUR KNEE STIFFNESS AFTER FIRST WAKING IN MORNING: MODERATE
KOOS JR SCORING: 47.49
TWISING OR PIVOTING ON KNEE: SEVERE

## 2021-08-31 ASSESSMENT — MIFFLIN-ST. JEOR: SCORE: 1765.2

## 2021-08-31 NOTE — PROGRESS NOTES
"    [facility]  Orthopaedic Surgery Consultation by Marvin Ordonez M.D.    Willard Manzano MRN# [id number]   Age: 56 year old YOB: 1965     Requesting physician: Cassandra Witt     Background history:  DX:  1. Migraine  2. Chiari malformation type I  3. Hyperlipidemia  4. Prediabetes  5. Varicose veins  6. Rotator cuff tear right shoulder  7. Benign prostatic hyperplasia  8. History of DVT    TREATMENTS:  1. None           History of Present Illness:   56 year old male who presents her clinic because of chronic left knee pain.  This pain has been present for approximately 4 months after kneeling down.  Ever since the knee has been bothering him.  The pain is located behind the kneecap and on the lateral side of the knee.  Also patient is describing some pain posterior in the knee.  Patient describes the presence of swelling, occasional clicking.  No mechanical instability.  Patient endorses night pain, initiation stiffness and soreness.  He is not limited in his daily activities but has to perform these with pain.  He has not tried over-the-counter analgesics.  He has not seen a physical therapist.  He has not had any intra-articular injections.  He did try a neoprene sleeve for bracing.    Social:   Occupation: [employer] ([employer])- standing and seated work   Living situation: Lives with spouse  Hobbies / Sports: model airplanes     Smoking: No  Alcohol: No  Illicit drug use: No         Physical Exam:     EXAMINATION pertinent findings:   PSYCH: Pleasant, healthy-appearing, alert, oriented x3, cooperative. Normal mood and affect.  VITAL SIGNS: Blood pressure 112/78, height 1.778 m (5' 10\"), weight 93.9 kg (207 lb).  Reviewed nursing intake notes.   Body mass index is 29.7 kg/m .  RESP: non labored breathing   ABD: benign, soft, non-tender, no acute peritoneal findings  SKIN: grossly normal   LYMPHATIC: grossly normal, no adenopathy, no extremity edema  NEURO: grossly " normal , no motor deficits  VASCULAR: satisfactory perfusion of all extremities   MUSCULOSKELETAL:   Alignment: Neutral  Gait: Slight antalgic gait of the left lower extremity.    The left hip exhibits a full range of motion.  No pain upon rotations.  Lasegue's test is negative.    L knee: -0-0  . Straight leg raise +. No redness, warmth or skin changes present. Effusion Some. Ligamentously stable in both ML and AP direction. Normal PF tracking with some crepitus. Rabot +.  Meniscal provocation tests are negative.  Some tenderness to palpation over the joint line.     Left LE:   Thigh and leg compartments soft and compressible   +Quad/TA/GSC/FHL/EHL   SILT DP/SP/Mercy/Saph/Tib nerve distributions   Palpable dorsalis pedis pulse              Data:   All laboratory data reviewed  All imaging studies reviewed by me personally.    XR left knee 8/31/2021:  My interpretation: Reasonably well-preserved tibiofemoral joint without mild degenerative changes.  Mild to moderate degenerative changes of the patellofemoral joint with some joint space narrowing, small marginal osteophytes and sclerosis.            Assessment and Plan:   Assessment:  56-year-old male with chronic pain of left knee most likely due to mild to moderate degenerative changes most notably in the patellofemoral joint.  No nonoperative treatment strategies initiated so far.     Plan:  I had a discussion with the patient regarding ongoing management options.  Reviewed surgical and nonsurgical treatments.  The non-surgical options include activity modification, weight loss, pain medication, PT, bracing and injection therapy. As for surgery we discussed the option of partial and total knee replacement surgery.  Given his current symptomatology would be my recommendation to pursue nonoperative treatment strategies.  This would consist of NSAIDs as needed, physical therapy for range of motion strengthening exercises especially focused on the VMO.   Potentially this could be augmented by taping.  Lastly an intra-articular injection with a combination of lidocaine and cortisone was offered.  Patient understands and agrees to the treatment plan as set forth.  A referral to PT was provided.  After obtaining informed consent the left knee was injected without complications.  We will follow-up with patient in 3 months.     More information on joint replacements can be found on : [email or web address]    Thank you for your referral.    Marvin Ordonez MD, PhD     Adult Reconstruction  [facility] Department of Orthopaedic Surgery  Pager [phone]    Large Joint Injection/Arthocentesis: L knee joint    Date/Time: 8/31/2021 4:29 PM  Performed by: Marvin Ordonez MD  Authorized by: Marvin Ordonez MD     Indications:  Pain and osteoarthritis  Needle Size:  22 G  Guidance: landmark guided    Approach:  Superolateral  Location:  Knee      Medications:  40 mg triamcinolone 40 MG/ML; 1 mL lidocaine 1 %  Medications comment:  7 ml of lidocaine 1% was infiltrated into the left knee.   Outcome:  Tolerated well, no immediate complications  Procedure discussed: discussed risks, benefits, and alternatives    Consent Given by:  Patient  Timeout: timeout called immediately prior to procedure    Prep: patient was prepped and draped in usual sterile fashion          DATA for DOCUMENTATION:         Past Medical History:     Patient Active Problem List   Diagnosis     Chiari malformation type I (H)     Hyperlipidemia LDL goal <160     Family history of colon cancer     Elevated fasting glucose     Family history of coronary artery disease     Esophageal reflux     Benign non-nodular prostatic hyperplasia with lower urinary tract symptoms     Personal history of DVT (deep vein thrombosis)     Prediabetes     Pedal edema     Varicose veins of both lower extremities     Pain in both knees,  unspecified chronicity     Past Medical History:   Diagnosis Date     Arthritis 1/2011     Blood clotting disorder (H)      Cancer (H)     Skin     Chiari malformation type I (H)      Elevated fasting glucose 1/19/2012     Headaches, migraine      Headaches, migraine      Other and unspecified hyperlipidemia      Pedal edema 6/10/2021     Rotator cuff tear 1/22/2012     Rotator cuff tear 1/22/2012     Rotator cuff tendinitis 1/2011    Inj. Dr. Cheney.      Spinal headache 2007    spontaneous spinal fluid leak - 2 blood patches     Urethral polyp 6-24-10    Dr. Card     Urethral polyp 6/24/2010    Dr. Card      Varicose veins of both lower extremities 6/10/2021       Also see scanned health assessment forms.       Past Surgical History:     Past Surgical History:   Procedure Laterality Date     ABDOMEN SURGERY  2006    Umbilical Hernia repair     BIOPSY      arms cancer, 2 spots     COLONOSCOPY  12/29/10    non-adenomatous polyp. Advised 5 yr f/u.      COLONOSCOPY N/A 2/19/2021    Procedure: COLONOSCOPY;  Surgeon: Broderick Rivera MD;  Location: [facility]     HC REMOVAL OF TONSILS,<9 Y/O       HC VASECTOMY UNILAT/BILAT W POSTOP SEMEN       HERNIA REPAIR  2006     ORTHOPEDIC SURGERY  2018    Rotator Cuff Repair     SURGICAL HISTORY OF -   2/2006    umbilical hernia repair with mesh     [facility] COLONOSCOPY THRU STOMA, DIAGNOSTIC  6/26/08    normal. 3 yr f/u due to brother CA age 43.            Social History:     Social History     Socioeconomic History     Marital status:      Spouse name: Not on file     Number of children: Not on file     Years of education: Not on file     Highest education level: Not on file   Occupational History     Not on file   Tobacco Use     Smoking status: Never Smoker     Smokeless tobacco: Never Used     Tobacco comment: Some sporadic use 40+ years ago- cigars   Substance and Sexual Activity     Alcohol use: Yes     Comment: 1-2 drinks a week     Drug use: No     Sexual  activity: Yes     Partners: Female     Birth control/protection: Male Surgical     Comment: I'm fixed   Other Topics Concern     Parent/sibling w/ CABG, MI or angioplasty before 65F 55M? Yes     Comment: Brother   Social History Narrative     Not on file     Social Determinants of Health     Financial Resource Strain:      Difficulty of Paying Living Expenses:    Food Insecurity:      Worried About Running Out of Food in the Last Year:      Ran Out of Food in the Last Year:    Transportation Needs:      Lack of Transportation (Medical):      Lack of Transportation (Non-Medical):    Physical Activity:      Days of Exercise per Week:      Minutes of Exercise per Session:    Stress:      Feeling of Stress :    Social Connections:      Frequency of Communication with Friends and Family:      Frequency of Social Gatherings with Friends and Family:      Attends Yazdanism Services:      Active Member of Clubs or Organizations:      Attends Club or Organization Meetings:      Marital Status:    Intimate Partner Violence:      Fear of Current or Ex-Partner:      Emotionally Abused:      Physically Abused:      Sexually Abused:             Family History:       Family History   Problem Relation Age of Onset     Diabetes Mother         dx in her 40's. overwt. Now IDDM.     Hypertension Mother      Lipids Mother      Gallbladder Disease Mother      Cancer Father          lung CA with brain mets age 65     Other Cancer Father         Lung     Substance Abuse Father         Alcoholism     Other Cancer Brother         Lung     Cardiovascular Brother         Loi. Brother MI at age 49. Had another stent at age 52     Diabetes Sister      Family History Negative Sister      Allergies Son      Substance Abuse Son      Family History Negative Son      Family History Negative Daughter      Breast Cancer Sister         Dx bilateral breast CA at age 56.      Other Cancer Sister         Lung     Breast Cancer Sister         (pronounced  Rashmi)     Colon Cancer Brother         Diagnosed at age 40s     Other Cancer Brother         Lung, mets to brain     Substance Abuse Son         Alcoholism            Medications:     Current Outpatient Medications   Medication Sig     aspirin 81 MG EC tablet Take 81 mg by mouth daily     fenofibrate (TRICOR) 145 MG tablet TAKE 1 TABLET(145 MG) BY MOUTH DAILY     fexofenadine (ALLEGRA) 180 MG tablet Take 180 mg by mouth daily as needed     meloxicam (MOBIC) 15 MG tablet Take 1 tablet (15 mg) by mouth daily     metFORMIN (GLUCOPHAGE-XR) 500 MG 24 hr tablet TAKE 1 TABLET(500 MG) BY MOUTH DAILY WITH DINNER     Omega-3 Fatty Acids (FISH OIL PO)      omeprazole (PRILOSEC) 40 MG DR capsule TAKE 1 CAPSULE(40 MG) BY MOUTH DAILY 30 TO 60 MINUTES BEFORE A MEAL     simvastatin (ZOCOR) 20 MG tablet TAKE 1 TABLET(20 MG) BY MOUTH AT BEDTIME     No current facility-administered medications for this visit.              Review of Systems:   A comprehensive 10 point review of systems (constitutional, ENT, cardiac, peripheral vascular, lymphatic, respiratory, GI, , Musculoskeletal, skin, Neurological) was performed and found to be negative except as described in this note.     See intake form completed by patient

## 2021-08-31 NOTE — LETTER
"    8/31/2021         RE: Willard Manzano  [address] [facility] [address]        Dear Colleague,    Thank you for referring your patient, Willard Manzano, to the [facility] [place]. Please see a copy of my visit note below.        [facility]  Orthopaedic Surgery Consultation by Marvin Ordonez M.D.    Willard Manzano MRN# [id number]   Age: 56 year old YOB: 1965     Requesting physician: Cassandra Witt     Background history:  DX:  1. Migraine  2. Chiari malformation type I  3. Hyperlipidemia  4. Prediabetes  5. Varicose veins  6. Rotator cuff tear right shoulder  7. Benign prostatic hyperplasia  8. History of DVT    TREATMENTS:  1. None           History of Present Illness:   56 year old male who presents her clinic because of chronic left knee pain.  This pain has been present for approximately 4 months after kneeling down.  Ever since the knee has been bothering him.  The pain is located behind the kneecap and on the lateral side of the knee.  Also patient is describing some pain posterior in the knee.  Patient describes the presence of swelling, occasional clicking.  No mechanical instability.  Patient endorses night pain, initiation stiffness and soreness.  He is not limited in his daily activities but has to perform these with pain.  He has not tried over-the-counter analgesics.  He has not seen a physical therapist.  He has not had any intra-articular injections.  He did try a neoprene sleeve for bracing.    Social:   Occupation: [employer] ([employer])- standing and seated work   Living situation: Lives with spouse  Hobbies / Sports: model airplanes     Smoking: No  Alcohol: No  Illicit drug use: No         Physical Exam:     EXAMINATION pertinent findings:   PSYCH: Pleasant, healthy-appearing, alert, oriented x3, cooperative. Normal mood and affect.  VITAL SIGNS: Blood pressure 112/78, height 1.778 m (5' 10\"), weight 93.9 kg (207 lb).  " Reviewed nursing intake notes.   Body mass index is 29.7 kg/m .  RESP: non labored breathing   ABD: benign, soft, non-tender, no acute peritoneal findings  SKIN: grossly normal   LYMPHATIC: grossly normal, no adenopathy, no extremity edema  NEURO: grossly normal , no motor deficits  VASCULAR: satisfactory perfusion of all extremities   MUSCULOSKELETAL:   Alignment: Neutral  Gait: Slight antalgic gait of the left lower extremity.    The left hip exhibits a full range of motion.  No pain upon rotations.  Lasegue's test is negative.    L knee: -0-0  . Straight leg raise +. No redness, warmth or skin changes present. Effusion Some. Ligamentously stable in both ML and AP direction. Normal PF tracking with some crepitus. Rabot +.  Meniscal provocation tests are negative.  Some tenderness to palpation over the joint line.     Left LE:   Thigh and leg compartments soft and compressible   +Quad/TA/GSC/FHL/EHL   SILT DP/SP/[facility]/Saph/Tib nerve distributions   Palpable dorsalis pedis pulse              Data:   All laboratory data reviewed  All imaging studies reviewed by me personally.    XR left knee 8/31/2021:  My interpretation: Reasonably well-preserved tibiofemoral joint without mild degenerative changes.  Mild to moderate degenerative changes of the patellofemoral joint with some joint space narrowing, small marginal osteophytes and sclerosis.            Assessment and Plan:   Assessment:  56-year-old male with chronic pain of left knee most likely due to mild to moderate degenerative changes most notably in the patellofemoral joint.  No nonoperative treatment strategies initiated so far.     Plan:  I had a discussion with the patient regarding ongoing management options.  Reviewed surgical and nonsurgical treatments.  The non-surgical options include activity modification, weight loss, pain medication, PT, bracing and injection therapy. As for surgery we discussed the option of partial and total knee replacement  surgery.  Given his current symptomatology would be my recommendation to pursue nonoperative treatment strategies.  This would consist of NSAIDs as needed, physical therapy for range of motion strengthening exercises especially focused on the VMO.  Potentially this could be augmented by taping.  Lastly an intra-articular injection with a combination of lidocaine and cortisone was offered.  Patient understands and agrees to the treatment plan as set forth.  A referral to PT was provided.  After obtaining informed consent the left knee was injected without complications.  We will follow-up with patient in 3 months.     More information on joint replacements can be found on : [email or web address]    Thank you for your referral.    Marvin Ordonez MD, PhD     Adult Reconstruction  [facility] Department of Orthopaedic Surgery  Pager [phone]    Large Joint Injection/Arthocentesis: L knee joint    Date/Time: 8/31/2021 4:29 PM  Performed by: Marvin Ordonez MD  Authorized by: Marvin Ordonez MD     Indications:  Pain and osteoarthritis  Needle Size:  22 G  Guidance: landmark guided    Approach:  Superolateral  Location:  Knee      Medications:  40 mg triamcinolone 40 MG/ML; 1 mL lidocaine 1 %  Medications comment:  7 ml of lidocaine 1% was infiltrated into the left knee.   Outcome:  Tolerated well, no immediate complications  Procedure discussed: discussed risks, benefits, and alternatives    Consent Given by:  Patient  Timeout: timeout called immediately prior to procedure    Prep: patient was prepped and draped in usual sterile fashion          DATA for DOCUMENTATION:         Past Medical History:     Patient Active Problem List   Diagnosis     Chiari malformation type I (H)     Hyperlipidemia LDL goal <160     Family history of colon cancer     Elevated fasting glucose     Family history of coronary artery disease     Esophageal  reflux     Benign non-nodular prostatic hyperplasia with lower urinary tract symptoms     Personal history of DVT (deep vein thrombosis)     Prediabetes     Pedal edema     Varicose veins of both lower extremities     Pain in both knees, unspecified chronicity     Past Medical History:   Diagnosis Date     Arthritis 1/2011     Blood clotting disorder (H)      Cancer (H)     Skin     Chiari malformation type I (H)      Elevated fasting glucose 1/19/2012     Headaches, migraine      Headaches, migraine      Other and unspecified hyperlipidemia      Pedal edema 6/10/2021     Rotator cuff tear 1/22/2012     Rotator cuff tear 1/22/2012     Rotator cuff tendinitis 1/2011    Inj. Dr. Cheney.      Spinal headache 2007    spontaneous spinal fluid leak - 2 blood patches     Urethral polyp 6-24-10    Dr. Card     Urethral polyp 6/24/2010    Dr. Card      Varicose veins of both lower extremities 6/10/2021       Also see scanned health assessment forms.       Past Surgical History:     Past Surgical History:   Procedure Laterality Date     ABDOMEN SURGERY  2006    Umbilical Hernia repair     BIOPSY      arms cancer, 2 spots     COLONOSCOPY  12/29/10    non-adenomatous polyp. Advised 5 yr f/u.      COLONOSCOPY N/A 2/19/2021    Procedure: COLONOSCOPY;  Surgeon: Broderick Rivera MD;  Location: [facility]     HC REMOVAL OF TONSILS,<13 Y/O       HC VASECTOMY UNILAT/BILAT W POSTOP SEMEN       HERNIA REPAIR  2006     ORTHOPEDIC SURGERY  2018    Rotator Cuff Repair     SURGICAL HISTORY OF -   2/2006    umbilical hernia repair with mesh     [facility]HC COLONOSCOPY THRU STOMA, DIAGNOSTIC  6/26/08    normal. 3 yr f/u due to brother CA age 43.            Social History:     Social History     Socioeconomic History     Marital status:      Spouse name: Not on file     Number of children: Not on file     Years of education: Not on file     Highest education level: Not on file   Occupational History     Not on file   Tobacco Use      Smoking status: Never Smoker     Smokeless tobacco: Never Used     Tobacco comment: Some sporadic use 40+ years ago- cigars   Substance and Sexual Activity     Alcohol use: Yes     Comment: 1-2 drinks a week     Drug use: No     Sexual activity: Yes     Partners: Female     Birth control/protection: Male Surgical     Comment: I'm fixed   Other Topics Concern     Parent/sibling w/ CABG, MI or angioplasty before 65F 55M? Yes     Comment: Brother   Social History Narrative     Not on file     Social Determinants of Health     Financial Resource Strain:      Difficulty of Paying Living Expenses:    Food Insecurity:      Worried About Running Out of Food in the Last Year:      Ran Out of Food in the Last Year:    Transportation Needs:      Lack of Transportation (Medical):      Lack of Transportation (Non-Medical):    Physical Activity:      Days of Exercise per Week:      Minutes of Exercise per Session:    Stress:      Feeling of Stress :    Social Connections:      Frequency of Communication with Friends and Family:      Frequency of Social Gatherings with Friends and Family:      Attends Shinto Services:      Active Member of Clubs or Organizations:      Attends Club or Organization Meetings:      Marital Status:    Intimate Partner Violence:      Fear of Current or Ex-Partner:      Emotionally Abused:      Physically Abused:      Sexually Abused:             Family History:       Family History   Problem Relation Age of Onset     Diabetes Mother         dx in her 40's. overwt. Now IDDM.     Hypertension Mother      Lipids Mother      Gallbladder Disease Mother      Cancer Father          lung CA with brain mets age 65     Other Cancer Father         Lung     Substance Abuse Father         Alcoholism     Other Cancer Brother         Lung     Cardiovascular Brother         Loi. Brother MI at age 49. Had another stent at age 52     Diabetes Sister      Family History Negative Sister      Allergies Son       Substance Abuse Son      Family History Negative Son      Family History Negative Daughter      Breast Cancer Sister         Dx bilateral breast CA at age 56.      Other Cancer Sister         Lung     Breast Cancer Sister         (pronounced Muh-jay)     Colon Cancer Brother         Diagnosed at age 40s     Other Cancer Brother         Lung, mets to brain     Substance Abuse Son         Alcoholism            Medications:     Current Outpatient Medications   Medication Sig     aspirin 81 MG EC tablet Take 81 mg by mouth daily     fenofibrate (TRICOR) 145 MG tablet TAKE 1 TABLET(145 MG) BY MOUTH DAILY     fexofenadine (ALLEGRA) 180 MG tablet Take 180 mg by mouth daily as needed     meloxicam (MOBIC) 15 MG tablet Take 1 tablet (15 mg) by mouth daily     metFORMIN (GLUCOPHAGE-XR) 500 MG 24 hr tablet TAKE 1 TABLET(500 MG) BY MOUTH DAILY WITH DINNER     Omega-3 Fatty Acids (FISH OIL PO)      omeprazole (PRILOSEC) 40 MG DR capsule TAKE 1 CAPSULE(40 MG) BY MOUTH DAILY 30 TO 60 MINUTES BEFORE A MEAL     simvastatin (ZOCOR) 20 MG tablet TAKE 1 TABLET(20 MG) BY MOUTH AT BEDTIME     No current facility-administered medications for this visit.              Review of Systems:   A comprehensive 10 point review of systems (constitutional, ENT, cardiac, peripheral vascular, lymphatic, respiratory, GI, , Musculoskeletal, skin, Neurological) was performed and found to be negative except as described in this note.     See intake form completed by patient          Again, thank you for allowing me to participate in the care of your patient.        Sincerely,        Marvin Ordonez MD

## 2021-08-31 NOTE — PATIENT INSTRUCTIONS
1. Primary osteoarthritis of left knee    2. Pain in both knees, unspecified chronicity      Steroid injection of the left knee was performed today in clinic    - Would not soak in a hot tub, bath or swimming pool for 48 hours  - Ok to shower  - Ice today and only do your normal amounts of activity  - The lidocaine (what is giving you pain relief right now) will likely stop working in 1-2 hours.  You will then have pain again, similar to before you received the injection. The corticosteroid will not start working until approximately 1-2 weeks from now.  In a small percentage of people, cortisone can cause flushing/redness in the face. This usually lasts for 1-3 days and resolves. Cool compress and Ibuprofen/Tylenol can help if this happens.    Physical Therapy orders have been placed with [facility].  You can call [phone] to schedule at your convenience.     Follow up with Dr. Ordonez in 3 months.    Call my office with any questions or concerns, [phone].

## 2021-09-17 ENCOUNTER — THERAPY VISIT (OUTPATIENT)
Dept: PHYSICAL THERAPY | Facility: CLINIC | Age: 56
End: 2021-09-17
Attending: STUDENT IN AN ORGANIZED HEALTH CARE EDUCATION/TRAINING PROGRAM
Payer: COMMERCIAL

## 2021-09-17 DIAGNOSIS — M17.12 PRIMARY OSTEOARTHRITIS OF LEFT KNEE: ICD-10-CM

## 2021-09-17 DIAGNOSIS — M25.561 PAIN IN BOTH KNEES, UNSPECIFIED CHRONICITY: Primary | ICD-10-CM

## 2021-09-17 DIAGNOSIS — M25.562 LEFT KNEE PAIN: ICD-10-CM

## 2021-09-17 DIAGNOSIS — M17.12 LEFT KNEE DJD: ICD-10-CM

## 2021-09-17 DIAGNOSIS — M25.562 PAIN IN BOTH KNEES, UNSPECIFIED CHRONICITY: Primary | ICD-10-CM

## 2021-09-17 PROCEDURE — 97161 PT EVAL LOW COMPLEX 20 MIN: CPT | Mod: GP | Performed by: PHYSICAL THERAPIST

## 2021-09-17 PROCEDURE — 97110 THERAPEUTIC EXERCISES: CPT | Mod: GP | Performed by: PHYSICAL THERAPIST

## 2021-09-17 ASSESSMENT — ACTIVITIES OF DAILY LIVING (ADL)
GO DOWN STAIRS: ACTIVITY IS MINIMALLY DIFFICULT
RISE FROM A CHAIR: ACTIVITY IS MINIMALLY DIFFICULT
RAW_SCORE: 49
PAIN: THE SYMPTOM AFFECTS MY ACTIVITY SLIGHTLY
STIFFNESS: THE SYMPTOM AFFECTS MY ACTIVITY SLIGHTLY
GO UP STAIRS: ACTIVITY IS MINIMALLY DIFFICULT
STAND: ACTIVITY IS MINIMALLY DIFFICULT
WEAKNESS: THE SYMPTOM AFFECTS MY ACTIVITY SLIGHTLY
KNEE_ACTIVITY_OF_DAILY_LIVING_SUM: 49
HOW_WOULD_YOU_RATE_THE_CURRENT_FUNCTION_OF_YOUR_KNEE_DURING_YOUR_USUAL_DAILY_ACTIVITIES_ON_A_SCALE_FROM_0_TO_100_WITH_100_BEING_YOUR_LEVEL_OF_KNEE_FUNCTION_PRIOR_TO_YOUR_INJURY_AND_0_BEING_THE_INABILITY_TO_PERFORM_ANY_OF_YOUR_USUAL_DAILY_ACTIVITIES?: 30
GIVING WAY, BUCKLING OR SHIFTING OF KNEE: THE SYMPTOM AFFECTS MY ACTIVITY MODERATELY
AS_A_RESULT_OF_YOUR_KNEE_INJURY,_HOW_WOULD_YOU_RATE_YOUR_CURRENT_LEVEL_OF_DAILY_ACTIVITY?: ABNORMAL
LIMPING: I HAVE THE SYMPTOM BUT IT DOES NOT AFFECT MY ACTIVITY
KNEE_ACTIVITY_OF_DAILY_LIVING_SCORE: 70
WALK: ACTIVITY IS MINIMALLY DIFFICULT
SIT WITH YOUR KNEE BENT: ACTIVITY IS SOMEWHAT DIFFICULT
HOW_WOULD_YOU_RATE_THE_OVERALL_FUNCTION_OF_YOUR_KNEE_DURING_YOUR_USUAL_DAILY_ACTIVITIES?: ABNORMAL
KNEEL ON THE FRONT OF YOUR KNEE: ACTIVITY IS SOMEWHAT DIFFICULT
SWELLING: I HAVE THE SYMPTOM BUT IT DOES NOT AFFECT MY ACTIVITY
SQUAT: ACTIVITY IS MINIMALLY DIFFICULT

## 2021-09-17 NOTE — PROGRESS NOTES
Physical Therapy Initial Evaluation  Subjective:  Patient is referred with a 6 month hx of L knee pain and minor swelling. No injury recalled. Notes minor pain in R knee as well but L is worse. Pain is worse with kneeling, squatting and descending stairs. X-ray indicated some joint space narrowing and patellar spur.    The history is provided by the patient.   Patient Health History  Willard MARLIN Manzano being seen for Knees.     Problem began: 5/24/2021.   Problem occurred: not sure   Pain is reported as 5/10 on pain scale.  General health as reported by patient is good.  Pertinent medical history includes: cancer.     Medical allergies: none.   Surgeries include:  Other. Other surgery history details: right shoulder.    Current medications:  Anti-inflammatory.       Primary job tasks include:  Other and prolonged standing.                  Therapist Generated HPI Evaluation         Type of problem:  Left knee.    This is a new condition.  Condition occurred with:  Degenerative joint disease and insidious onset.  Where condition occurred: for unknown reasons.  Patient reports pain:  In the joint, anterior and sub patellar.  Pain is described as aching and is constant.  Pain is worse during the day.  Since onset symptoms are unchanged.  Associated symptoms:  Loss of motion/stiffness, loss of strength and edema. Symptoms are exacerbated by bending/squatting, kneeling and descending stairs  and relieved by nothing.  Special tests included:  X-ray.    Restrictions due to condition include:  Working in normal job without restrictions.  Barriers include:  None as reported by patient.    Patient Health History  Willard V Jose Juan being seen for Knees.     Problem began: 5/24/2021.   Problem occurred: not sure   Pain is reported as 5/10 on pain scale.  General health as reported by patient is good.  Pertinent medical history includes: cancer.     Medical allergies: none.   Surgeries include:  Other. Other surgery history details: right  shoulder.    Current medications:  Anti-inflammatory.       Primary job tasks include:  Other and prolonged standing.                                    Objective:  System                                                Knee Evaluation:  ROM:      PROM      Extension: Left: 5    Right:  5  Flexion: Left: 135    Right:  140      Strength:     Extension:  Left: 4/5    Pain:++      Right: 5/5   Pain:  Flexion:  Left: 5/5   Pain:      Right: 5/5   Pain:    Quad Set Left: WNL    Pain:   Quad Set Right: WNL    Pain:  Ligament Testing:  Not Assessed                Special Tests:   Left knee positive for the following special tests:  Meniscal    Palpation:  Normal      Edema:    Circumference:      Joint Line:  Left:  40.75 cm   Right:  40 cm    Mobility Testing:  Normal            Functional Testing:          Quad:    Single Leg Squat:  Left:      NA   Right:      NA   Bilateral Leg Squat:   Mild loss of control              General     ROS    Assessment/Plan:    Patient is a 56 year old male with left side knee complaints.    Patient has the following significant findings with corresponding treatment plan.                Diagnosis 1:  Left knee DJD  Pain -  hot/cold therapy, self management, education and home program  Decreased joint mobility - manual therapy, therapeutic exercise and home program  Impaired muscle performance - neuro re-education and home program  Decreased function - therapeutic activities and home program    Therapy Evaluation Codes:   1) History comprised of:   Personal factors that impact the plan of care:      Time since onset of symptoms.    Comorbidity factors that impact the plan of care are:      Cancer.     Medications impacting care: Anti-inflammatory.  2) Examination of Body Systems comprised of:   Body structures and functions that impact the plan of care:      Knee.   Activity limitations that impact the plan of care are:      Squatting/kneeling, Stairs and Walking.  3) Clinical presentation  characteristics are:   Stable/Uncomplicated.  4) Decision-Making    Low complexity using standardized patient assessment instrument and/or measureable assessment of functional outcome.  Cumulative Therapy Evaluation is: Low complexity.    Previous and current functional limitations:  (See Goal Flow Sheet for this information)    Short term and Long term goals: (See Goal Flow Sheet for this information)     Communication ability:  Patient appears to be able to clearly communicate and understand verbal and written communication and follow directions correctly.  Treatment Explanation - The following has been discussed with the patient:   RX ordered/plan of care  Anticipated outcomes  Possible risks and side effects  This patient would benefit from PT intervention to resume normal activities.   Rehab potential is good.    Frequency:  1 X week, once daily  Duration:  for 6 weeks  Discharge Plan:  Achieve all LTG.  Independent in home treatment program.  Reach maximal therapeutic benefit.    Please refer to the daily flowsheet for treatment today, total treatment time and time spent performing 1:1 timed codes.

## 2021-09-23 ENCOUNTER — THERAPY VISIT (OUTPATIENT)
Dept: PHYSICAL THERAPY | Facility: CLINIC | Age: 56
End: 2021-09-23
Payer: COMMERCIAL

## 2021-09-23 DIAGNOSIS — R73.01 ELEVATED FASTING GLUCOSE: ICD-10-CM

## 2021-09-23 DIAGNOSIS — M25.562 LEFT KNEE PAIN: ICD-10-CM

## 2021-09-23 DIAGNOSIS — M17.12 LEFT KNEE DJD: ICD-10-CM

## 2021-09-23 DIAGNOSIS — M25.562 PAIN IN BOTH KNEES, UNSPECIFIED CHRONICITY: ICD-10-CM

## 2021-09-23 DIAGNOSIS — M25.561 PAIN IN BOTH KNEES, UNSPECIFIED CHRONICITY: ICD-10-CM

## 2021-09-23 PROCEDURE — 97110 THERAPEUTIC EXERCISES: CPT | Mod: GP | Performed by: PHYSICAL THERAPIST

## 2021-09-24 RX ORDER — METFORMIN HCL 500 MG
TABLET, EXTENDED RELEASE 24 HR ORAL
Qty: 90 TABLET | Refills: 0 | Status: SHIPPED | OUTPATIENT
Start: 2021-09-24 | End: 2022-01-03

## 2021-09-24 RX ORDER — MELOXICAM 15 MG/1
TABLET ORAL
Qty: 90 TABLET | Refills: 0 | Status: SHIPPED | OUTPATIENT
Start: 2021-09-24 | End: 2022-01-03

## 2021-09-24 NOTE — TELEPHONE ENCOUNTER
Prescription approved per [facility] Refill Protocol.    Marika Carr RN on 9/24/2021 at 9:57 AM

## 2021-09-25 DIAGNOSIS — E78.5 HYPERLIPIDEMIA LDL GOAL <130: ICD-10-CM

## 2021-09-27 RX ORDER — SIMVASTATIN 20 MG
TABLET ORAL
Qty: 90 TABLET | Refills: 3 | Status: SHIPPED | OUTPATIENT
Start: 2021-09-27 | End: 2022-05-23

## 2021-09-27 NOTE — TELEPHONE ENCOUNTER
Prescription approved per [facility] Refill Protocol.  Melody Guillory RN, BSN  Message handled by CLINIC NURSE.

## 2021-09-30 ENCOUNTER — THERAPY VISIT (OUTPATIENT)
Dept: PHYSICAL THERAPY | Facility: CLINIC | Age: 56
End: 2021-09-30
Payer: COMMERCIAL

## 2021-09-30 DIAGNOSIS — M25.562 CHRONIC PAIN OF LEFT KNEE: ICD-10-CM

## 2021-09-30 DIAGNOSIS — M17.5 OTHER SECONDARY OSTEOARTHRITIS OF LEFT KNEE: ICD-10-CM

## 2021-09-30 DIAGNOSIS — G89.29 CHRONIC PAIN OF LEFT KNEE: ICD-10-CM

## 2021-09-30 PROCEDURE — 97110 THERAPEUTIC EXERCISES: CPT | Mod: GP | Performed by: PHYSICAL THERAPY ASSISTANT

## 2021-09-30 PROCEDURE — 97112 NEUROMUSCULAR REEDUCATION: CPT | Mod: GP | Performed by: PHYSICAL THERAPY ASSISTANT

## 2021-10-05 ENCOUNTER — THERAPY VISIT (OUTPATIENT)
Dept: PHYSICAL THERAPY | Facility: CLINIC | Age: 56
End: 2021-10-05
Payer: COMMERCIAL

## 2021-10-05 DIAGNOSIS — M25.562 CHRONIC PAIN OF LEFT KNEE: ICD-10-CM

## 2021-10-05 DIAGNOSIS — G89.29 CHRONIC PAIN OF LEFT KNEE: ICD-10-CM

## 2021-10-05 DIAGNOSIS — M17.5 OTHER SECONDARY OSTEOARTHRITIS OF LEFT KNEE: ICD-10-CM

## 2021-10-05 PROCEDURE — 97112 NEUROMUSCULAR REEDUCATION: CPT | Mod: GP | Performed by: PHYSICAL THERAPY ASSISTANT

## 2021-10-05 PROCEDURE — 97110 THERAPEUTIC EXERCISES: CPT | Mod: GP | Performed by: PHYSICAL THERAPY ASSISTANT

## 2021-10-05 ASSESSMENT — ACTIVITIES OF DAILY LIVING (ADL)
WEAKNESS: THE SYMPTOM AFFECTS MY ACTIVITY SLIGHTLY
KNEE_ACTIVITY_OF_DAILY_LIVING_SCORE: 84.29
GIVING WAY, BUCKLING OR SHIFTING OF KNEE: THE SYMPTOM AFFECTS MY ACTIVITY SLIGHTLY
PAIN: I HAVE THE SYMPTOM BUT IT DOES NOT AFFECT MY ACTIVITY
HOW_WOULD_YOU_RATE_THE_CURRENT_FUNCTION_OF_YOUR_KNEE_DURING_YOUR_USUAL_DAILY_ACTIVITIES_ON_A_SCALE_FROM_0_TO_100_WITH_100_BEING_YOUR_LEVEL_OF_KNEE_FUNCTION_PRIOR_TO_YOUR_INJURY_AND_0_BEING_THE_INABILITY_TO_PERFORM_ANY_OF_YOUR_USUAL_DAILY_ACTIVITIES?: 80
KNEE_ACTIVITY_OF_DAILY_LIVING_SUM: 59
GO DOWN STAIRS: ACTIVITY IS MINIMALLY DIFFICULT
SQUAT: ACTIVITY IS MINIMALLY DIFFICULT
RISE FROM A CHAIR: ACTIVITY IS NOT DIFFICULT
HOW_WOULD_YOU_RATE_THE_OVERALL_FUNCTION_OF_YOUR_KNEE_DURING_YOUR_USUAL_DAILY_ACTIVITIES?: NEARLY NORMAL
STIFFNESS: I HAVE THE SYMPTOM BUT IT DOES NOT AFFECT MY ACTIVITY
AS_A_RESULT_OF_YOUR_KNEE_INJURY,_HOW_WOULD_YOU_RATE_YOUR_CURRENT_LEVEL_OF_DAILY_ACTIVITY?: NEARLY NORMAL
SWELLING: I HAVE THE SYMPTOM BUT IT DOES NOT AFFECT MY ACTIVITY
RAW_SCORE: 59
SIT WITH YOUR KNEE BENT: ACTIVITY IS MINIMALLY DIFFICULT
GO UP STAIRS: ACTIVITY IS NOT DIFFICULT
STAND: ACTIVITY IS NOT DIFFICULT
WALK: ACTIVITY IS NOT DIFFICULT
KNEEL ON THE FRONT OF YOUR KNEE: ACTIVITY IS MINIMALLY DIFFICULT
LIMPING: I DO NOT HAVE THE SYMPTOM

## 2021-10-05 NOTE — PROGRESS NOTES
Subjective:  HPI  Physical Exam       Knee Activity of Daily Living Score: 84.29            Objective:  System    Physical Exam    General     ROS    Assessment/Plan:    DISCHARGE REPORT    Progress reporting period is from 9/17/2021 to 10/5/2021.      SUBJECTIVE  Subjective changes noted by patient:   Patient report that the knee is getting better.  75% back to normal.  Patient reports that he does have to continue to work on his home exercise program to make progress.  Current pain level is  3/10    Previous pain level was:   Initial Pain level: 5/10   Changes in function:  Yes (See Goal flowsheet attached for changes in current functional level)     Adverse reaction to treatment or activity: None     OBJECTIVE  Changes noted in objective findings:  Yes,  Discussed proper strengthening and squats with the ball between the knees for better VMO strengthening.  PROM left knee 0-0-135.  MMT 5/5.  Patient has a good home exercise program and reviewed all the exercises for home.

## 2021-10-06 PROBLEM — M25.562 LEFT KNEE PAIN: Status: RESOLVED | Noted: 2021-09-17 | Resolved: 2021-10-06

## 2021-10-06 PROBLEM — M17.12 LEFT KNEE DJD: Status: RESOLVED | Noted: 2021-09-17 | Resolved: 2021-10-06

## 2021-10-06 NOTE — PROGRESS NOTES
Subjective:  HPI  Physical Exam       Knee Activity of Daily Living Score: 84.29            Objective:  System    Physical Exam    General     ROS    Assessment/Plan:    ASSESSMENT/PLAN  Updated problem list and treatment plan: Diagnosis 1:  L knee DJD  Pain -  home program  Decreased strength - home program  Decreased function - home program  Progress toward STG/LTGs have been made:  Yes (See Goal flow sheet completed today.)  Assessment of Progress: The patient's condition is improving.  Self Management Plans:  Patient has been instructed in a home treatment program.  Patient is independent in a home treatment program.  Patient  has been instructed in self management of symptoms.  Patient is independent in self management of symptoms.  Willard continues to require the following intervention to meet STG and LT's:  PT intervention is no longer required to meet STG/LTG.    Recommendations:  This patient is ready to be discharged from therapy and continue their home treatment program.    Please refer to the daily flowsheet for treatment today, total treatment time and time spent performing 1:1 timed codes.

## 2021-10-24 ENCOUNTER — HEALTH MAINTENANCE LETTER (OUTPATIENT)
Age: 56
End: 2021-10-24

## 2021-12-19 ENCOUNTER — HEALTH MAINTENANCE LETTER (OUTPATIENT)
Age: 56
End: 2021-12-19

## 2021-12-31 DIAGNOSIS — M25.562 PAIN IN BOTH KNEES, UNSPECIFIED CHRONICITY: ICD-10-CM

## 2021-12-31 DIAGNOSIS — M25.561 PAIN IN BOTH KNEES, UNSPECIFIED CHRONICITY: ICD-10-CM

## 2021-12-31 DIAGNOSIS — R73.01 ELEVATED FASTING GLUCOSE: ICD-10-CM

## 2022-01-03 RX ORDER — MELOXICAM 15 MG/1
TABLET ORAL
Qty: 90 TABLET | Refills: 0 | Status: SHIPPED | OUTPATIENT
Start: 2022-01-03 | End: 2022-04-11

## 2022-01-03 RX ORDER — METFORMIN HCL 500 MG
TABLET, EXTENDED RELEASE 24 HR ORAL
Qty: 90 TABLET | Refills: 0 | Status: SHIPPED | OUTPATIENT
Start: 2022-01-03 | End: 2022-02-18

## 2022-01-03 NOTE — TELEPHONE ENCOUNTER
Routing refill request to provider for review/approval because:  Labs not current: ALT, AST, CBC, A1c    Visit is up to date. RN will issue one time 90 day sharmin refill. Please advise on labs.   Van MONDRAGON RN

## 2022-02-18 ENCOUNTER — OFFICE VISIT (OUTPATIENT)
Dept: FAMILY MEDICINE | Facility: CLINIC | Age: 57
End: 2022-02-18
Payer: COMMERCIAL

## 2022-02-18 ENCOUNTER — PATIENT OUTREACH (OUTPATIENT)
Dept: ONCOLOGY | Facility: CLINIC | Age: 57
End: 2022-02-18

## 2022-02-18 VITALS
HEIGHT: 70 IN | DIASTOLIC BLOOD PRESSURE: 86 MMHG | BODY MASS INDEX: 30.06 KG/M2 | TEMPERATURE: 97.7 F | WEIGHT: 210 LBS | OXYGEN SATURATION: 96 % | HEART RATE: 72 BPM | SYSTOLIC BLOOD PRESSURE: 128 MMHG

## 2022-02-18 DIAGNOSIS — K21.00 GASTROESOPHAGEAL REFLUX DISEASE WITH ESOPHAGITIS WITHOUT HEMORRHAGE: Primary | ICD-10-CM

## 2022-02-18 DIAGNOSIS — Z80.0 FAMILY HISTORY OF COLON CANCER: ICD-10-CM

## 2022-02-18 DIAGNOSIS — R53.83 FATIGUE, UNSPECIFIED TYPE: ICD-10-CM

## 2022-02-18 DIAGNOSIS — R73.01 ELEVATED FASTING GLUCOSE: ICD-10-CM

## 2022-02-18 DIAGNOSIS — Z12.5 SCREENING FOR PROSTATE CANCER: ICD-10-CM

## 2022-02-18 DIAGNOSIS — Z80.3 FAMILY HISTORY OF MALIGNANT NEOPLASM OF BREAST: ICD-10-CM

## 2022-02-18 DIAGNOSIS — J01.00 ACUTE NON-RECURRENT MAXILLARY SINUSITIS: ICD-10-CM

## 2022-02-18 DIAGNOSIS — Z82.49 FAMILY HISTORY OF CORONARY ARTERY DISEASE: ICD-10-CM

## 2022-02-18 LAB
DEPRECATED CALCIDIOL+CALCIFEROL SERPL-MC: 12 UG/L (ref 20–75)
ERYTHROCYTE [DISTWIDTH] IN BLOOD BY AUTOMATED COUNT: 13.6 % (ref 10–15)
HBA1C MFR BLD: 6.4 % (ref 0–5.6)
HCT VFR BLD AUTO: 49.9 % (ref 40–53)
HGB BLD-MCNC: 16.6 G/DL (ref 13.3–17.7)
MCH RBC QN AUTO: 29.4 PG (ref 26.5–33)
MCHC RBC AUTO-ENTMCNC: 33.3 G/DL (ref 31.5–36.5)
MCV RBC AUTO: 89 FL (ref 78–100)
PLATELET # BLD AUTO: 337 10E3/UL (ref 150–450)
PSA SERPL-MCNC: 1.05 UG/L (ref 0–4)
RBC # BLD AUTO: 5.64 10E6/UL (ref 4.4–5.9)
TSH SERPL DL<=0.005 MIU/L-ACNC: 1.82 MU/L (ref 0.4–4)
WBC # BLD AUTO: 5.2 10E3/UL (ref 4–11)

## 2022-02-18 PROCEDURE — 0054A COVID-19,PF,PFIZER (12+ YRS): CPT | Performed by: PHYSICIAN ASSISTANT

## 2022-02-18 PROCEDURE — 99214 OFFICE O/P EST MOD 30 MIN: CPT | Mod: 25 | Performed by: PHYSICIAN ASSISTANT

## 2022-02-18 PROCEDURE — 90471 IMMUNIZATION ADMIN: CPT | Performed by: PHYSICIAN ASSISTANT

## 2022-02-18 PROCEDURE — 90682 RIV4 VACC RECOMBINANT DNA IM: CPT | Performed by: PHYSICIAN ASSISTANT

## 2022-02-18 PROCEDURE — 84443 ASSAY THYROID STIM HORMONE: CPT | Performed by: PHYSICIAN ASSISTANT

## 2022-02-18 PROCEDURE — 91305 COVID-19,PF,PFIZER (12+ YRS): CPT | Performed by: PHYSICIAN ASSISTANT

## 2022-02-18 PROCEDURE — 36415 COLL VENOUS BLD VENIPUNCTURE: CPT | Performed by: PHYSICIAN ASSISTANT

## 2022-02-18 PROCEDURE — 83036 HEMOGLOBIN GLYCOSYLATED A1C: CPT | Performed by: PHYSICIAN ASSISTANT

## 2022-02-18 PROCEDURE — 82306 VITAMIN D 25 HYDROXY: CPT | Performed by: PHYSICIAN ASSISTANT

## 2022-02-18 PROCEDURE — 85027 COMPLETE CBC AUTOMATED: CPT | Performed by: PHYSICIAN ASSISTANT

## 2022-02-18 PROCEDURE — G0103 PSA SCREENING: HCPCS | Performed by: PHYSICIAN ASSISTANT

## 2022-02-18 RX ORDER — OMEPRAZOLE 40 MG/1
CAPSULE, DELAYED RELEASE ORAL
Qty: 90 CAPSULE | Refills: 3 | Status: SHIPPED | OUTPATIENT
Start: 2022-02-18 | End: 2023-03-17

## 2022-02-18 RX ORDER — METFORMIN HCL 500 MG
TABLET, EXTENDED RELEASE 24 HR ORAL
Qty: 90 TABLET | Refills: 1 | Status: SHIPPED | OUTPATIENT
Start: 2022-02-18 | End: 2022-04-06

## 2022-02-18 NOTE — PROGRESS NOTES
"  Assessment & Plan     Gastroesophageal reflux disease with esophagitis without hemorrhage  Controlled with medications.   - omeprazole (PRILOSEC) 40 MG DR capsule; TAKE 1 CAPSULE(40 MG) BY MOUTH DAILY 30 TO 60 MINUTES BEFORE A MEAL    Elevated fasting glucose  Await labs  - HEMOGLOBIN A1C; Future  - metFORMIN (GLUCOPHAGE-XR) 500 MG 24 hr tablet; TAKE 1 TABLET(500 MG) BY MOUTH DAILY WITH DINNER  - HEMOGLOBIN A1C    Fatigue, unspecified type  Does not feel related to mood. Await labs.  - CBC with platelets; Future  - TSH with free T4 reflex; Future  - Vitamin D Deficiency; Future  - CBC with platelets  - TSH with free T4 reflex  - Vitamin D Deficiency    Acute non-recurrent maxillary sinusitis    - amoxicillin-clavulanate (AUGMENTIN) 875-125 MG tablet; Take 1 tablet by mouth 2 times daily for 10 days    Screening for prostate cancer    - PSA, screen; Future  - PSA, screen    Family history of colon cancer  Agreeable.   - Cancer Risk Mgmt/Cancer Genetic Counseling Referral; Future    Family history of coronary artery disease  Agreeable   - CT Coronary Calcium Scan; Future    Family history of malignant neoplasm of breast    - Cancer Risk Mgmt/Cancer Genetic Counseling Referral; Future             BMI:   Estimated body mass index is 30.13 kg/m  as calculated from the following:    Height as of this encounter: 1.778 m (5' 10\").    Weight as of this encounter: 95.3 kg (210 lb).           Return in about 1 year (around 2/18/2023).    SHAWN [facility] ELIZABETH Lamar is a 57 year old who presents for the following health issues     History of Present Illness       He eats 0-1 servings of fruits and vegetables daily.He consumes 1 sweetened beverage(s) daily.He exercises with enough effort to increase his heart rate 9 or less minutes per day.  He exercises with enough effort to increase his heart rate 3 or less days per week.   He is taking medications regularly.   " "    Prediabetes      How often are you checking your blood sugar? Not at all    What concerns do you have today about your diabetes? None     Do you have any of these symptoms? (Select all that apply)  No numbness or tingling in feet.  No redness, sores or blisters on feet.  No complaints of excessive thirst.  No reports of blurry vision.  No significant changes to weight.      BP Readings from Last 2 Encounters:   02/18/22 128/86   08/31/21 112/78     Hemoglobin A1C POCT (%)   Date Value   12/07/2020 6.1 (H)   11/13/2019 6.1 (H)     LDL Cholesterol Calculated (mg/dL)   Date Value   08/17/2021 127 (H)   12/07/2020 156 (H)   11/13/2019 144 (H)         Acute Illness  Acute illness concerns: sinus  Onset/Duration: 2-3 weeks  Symptoms:  Fever: no  Chills/Sweats: no  Headache (location?): YES  Sinus Pressure: YES  Conjunctivitis:  no  Ear Pain: no  Rhinorrhea: no  Congestion: YES  Sore Throat: no  Cough: no  Wheeze: no  Decreased Appetite: no  Nausea: no  Vomiting: no  Diarrhea: no  Dysuria/Freq.: no  Dysuria or Hematuria: no  Fatigue/Achiness: no  Sick/Strep Exposure: no  Therapies tried and outcome: None    Review of Systems   Constitutional, HEENT, cardiovascular, pulmonary, gi and gu systems are negative, except as otherwise noted.      Objective    /86 (BP Location: Right arm, Patient Position: Sitting, Cuff Size: Adult Regular)   Pulse 72   Temp 97.7  F (36.5  C) (Oral)   Ht 1.778 m (5' 10\")   Wt 95.3 kg (210 lb)   SpO2 96%   BMI 30.13 kg/m    Body mass index is 30.13 kg/m .  Physical Exam   GENERAL APPEARANCE: healthy, alert and no distress  HENT: ear canals and TM's normal, nasal mucosa edematous without rhinorrhea and oral mucous membranes moist  RESP: lungs clear to auscultation - no rales, rhonchi or wheezes  CV: regular rates and rhythm, normal S1 S2, no S3 or S4 and no murmur, click or rub  MS: extremities normal- no gross deformities noted  PSYCH: mentation appears normal and affect " normal/bright

## 2022-02-21 DIAGNOSIS — E55.9 VITAMIN D DEFICIENCY: Primary | ICD-10-CM

## 2022-04-06 DIAGNOSIS — R73.01 ELEVATED FASTING GLUCOSE: ICD-10-CM

## 2022-04-06 RX ORDER — METFORMIN HCL 500 MG
TABLET, EXTENDED RELEASE 24 HR ORAL
Qty: 90 TABLET | Refills: 0 | Status: SHIPPED | OUTPATIENT
Start: 2022-04-06 | End: 2022-11-01

## 2022-04-06 NOTE — TELEPHONE ENCOUNTER
Routing refill request to provider for review/approval because:  Labs not current:  Creatinine    Adeline Still RN

## 2022-04-08 ENCOUNTER — OFFICE VISIT (OUTPATIENT)
Dept: URGENT CARE | Facility: URGENT CARE | Age: 57
End: 2022-04-08
Payer: COMMERCIAL

## 2022-04-08 VITALS
BODY MASS INDEX: 30.28 KG/M2 | WEIGHT: 211 LBS | OXYGEN SATURATION: 96 % | RESPIRATION RATE: 16 BRPM | HEART RATE: 80 BPM | SYSTOLIC BLOOD PRESSURE: 120 MMHG | DIASTOLIC BLOOD PRESSURE: 72 MMHG | TEMPERATURE: 97.6 F

## 2022-04-08 DIAGNOSIS — J01.90 ACUTE SINUSITIS WITH SYMPTOMS > 10 DAYS: Primary | ICD-10-CM

## 2022-04-08 DIAGNOSIS — R05.9 COUGH: ICD-10-CM

## 2022-04-08 DIAGNOSIS — M25.562 PAIN IN BOTH KNEES, UNSPECIFIED CHRONICITY: ICD-10-CM

## 2022-04-08 DIAGNOSIS — M25.561 PAIN IN BOTH KNEES, UNSPECIFIED CHRONICITY: ICD-10-CM

## 2022-04-08 PROCEDURE — 99213 OFFICE O/P EST LOW 20 MIN: CPT | Performed by: NURSE PRACTITIONER

## 2022-04-08 RX ORDER — BENZONATATE 200 MG/1
200 CAPSULE ORAL 3 TIMES DAILY PRN
Qty: 20 CAPSULE | Refills: 0 | Status: SHIPPED | OUTPATIENT
Start: 2022-04-08 | End: 2022-05-18

## 2022-04-08 NOTE — PROGRESS NOTES
Assessment & Plan     Acute sinusitis with symptoms > 10 days  push fluids, rest as able.    Elevate HOB, lots of handwashing.    Toss your toothbrush after 24 hours on the antibiotics.  - amoxicillin-clavulanate (AUGMENTIN) 875-125 MG tablet  Dispense: 20 tablet; Refill: 0    Cough  Tessalon PRN  - benzonatate (TESSALON) 200 MG capsule  Dispense: 20 capsule; Refill: 0         Return in about 2 days (around 4/10/2022) for with regular provider if symptoms persist.    ANDREW Alvarez CNP  [facility] BINA Lamar is a 57 year old male who presents to clinic today for the following health issues:  Chief Complaint   Patient presents with     Sinus Problem     x 2 week, cough, sinus pain/pressure/drainage     HPI    URI Adult    Onset of symptoms was 2 week(s) ago.  Course of illness is worsening.    Severity moderate  Current and Associated symptoms: stuffy nose, cough - productive, shortness of breath, sore throat, hoarse voice, facial pain/pressure and headache  Treatment measures tried include OTC Cough med, Fluids and Rest.  Predisposing factors include None.  Lots of facial pressure and green drainage.      Review of Systems  Constitutional, HEENT, cardiovascular, pulmonary, GI, , musculoskeletal, neuro, skin, endocrine and psych systems are negative, except as otherwise noted.      Objective    /72 (BP Location: Right arm, Patient Position: Sitting, Cuff Size: Adult Large)   Pulse 80   Temp 97.6  F (36.4  C) (Tympanic)   Resp 16   Wt 95.7 kg (211 lb)   SpO2 96%   BMI 30.28 kg/m    Physical Exam   GENERAL: healthy, alert and no distress  EYES: Eyes grossly normal to inspection, PERRL and conjunctivae and sclerae normal  HENT: Bilateral frontal and maxillary sinus tenderness ear canals and TM's normal, nose and mouth without ulcers or lesions  NECK: no adenopathy, no asymmetry, masses, or scars and thyroid normal to palpation  RESP: lungs clear to  auscultation - no rales, rhonchi or wheezes  CV: regular rate and rhythm, normal S1 S2, no S3 or S4, no murmur, click or rub, no peripheral edema and peripheral pulses strong  ABDOMEN: soft, nontender, no hepatosplenomegaly, no masses and bowel sounds normal  MS: no gross musculoskeletal defects noted, no edema  SKIN: no suspicious lesions or rashes

## 2022-04-11 RX ORDER — MELOXICAM 15 MG/1
TABLET ORAL
Qty: 90 TABLET | Refills: 0 | Status: SHIPPED | OUTPATIENT
Start: 2022-04-11 | End: 2022-07-21

## 2022-04-11 NOTE — TELEPHONE ENCOUNTER
Routing refill request to provider for review/approval because:  Labs are not current:  ALT, AST, Creatinine  Lab Results   Component Value Date    ALT 40 12/07/2020     AST   Date Value Ref Range Status   12/07/2020 25 0 - 45 U/L Final     Creatinine   Date Value Ref Range Status   12/07/2020 1.12 0.66 - 1.25 mg/dL Final       Luisa Gonsales, RN, BSN, PHN  [facility]

## 2022-05-05 ENCOUNTER — HOSPITAL ENCOUNTER (OUTPATIENT)
Dept: CT IMAGING | Facility: CLINIC | Age: 57
Discharge: HOME | End: 2022-05-05
Attending: PHYSICIAN ASSISTANT | Admitting: PHYSICIAN ASSISTANT
Payer: COMMERCIAL

## 2022-05-05 DIAGNOSIS — Z82.49 FAMILY HISTORY OF CORONARY ARTERY DISEASE: ICD-10-CM

## 2022-05-05 PROCEDURE — 75571 CT HRT W/O DYE W/CA TEST: CPT | Mod: GA

## 2022-05-05 PROCEDURE — 75571 CT HRT W/O DYE W/CA TEST: CPT | Mod: 26 | Performed by: INTERNAL MEDICINE

## 2022-05-10 ENCOUNTER — VIRTUAL VISIT (OUTPATIENT)
Dept: ONCOLOGY | Facility: CLINIC | Age: 57
End: 2022-05-10
Attending: PHYSICIAN ASSISTANT
Payer: COMMERCIAL

## 2022-05-10 DIAGNOSIS — Z80.0 FAMILY HISTORY OF COLON CANCER: Primary | ICD-10-CM

## 2022-05-10 DIAGNOSIS — R93.1 ELEVATED CORONARY ARTERY CALCIUM SCORE: ICD-10-CM

## 2022-05-10 DIAGNOSIS — Z82.49 FAMILY HISTORY OF ISCHEMIC HEART DISEASE: Primary | ICD-10-CM

## 2022-05-10 DIAGNOSIS — Z80.3 FAMILY HISTORY OF MALIGNANT NEOPLASM OF BREAST: ICD-10-CM

## 2022-05-10 PROCEDURE — 96040 HC GENETIC COUNSELING, EACH 30 MINUTES: CPT | Mod: GT,95 | Performed by: GENETIC COUNSELOR, MS

## 2022-05-10 NOTE — PROGRESS NOTES
"5/10/2022    Willard is a 57 year old who is being evaluated via a billable video visit. Pt is in [place]    How would you like to obtain your AVS? MyChart  If the video visit is dropped, the invitation should be resent by: Text to cell phone: [phone]  Will anyone else be joining your video visit? No    Video-Visit Details  Type of service:  Video Visit  Video Start Time: 2:05pm  Video End Time:2:57pm  Originating Location (pt. Location): Home  Distant Location (provider location):  [facility] CANC[facility]   Platform used for Video Visit: Lisandra Early VF    Referring Provider: Cassandra Villafana PA-C    Presenting Information:   Given concerns regarding the potential for COVID-19 exposure during a clinic visit, Willard elected for a video genetic counseling visit through the Cancer Risk Management Program to discuss his family history of colon and breast cancer. We reviewed this history, cancer screening recommendations, and available genetic testing options.    Personal History:  Willard is a 57 year old male. Aside from \"several\" basal cell carcinomas removed from his arms and back since at age 52, he does not have any other personal history of cancer.    His most recent colonoscopy in February 2021 was normal and follow-up was recommended in five years. His prior exam in 2010 identified one polyp (colonic mucosa with lymphoid aggregate) and the exam in 2005 identified one inflammatory pseudopolyp. His most recent PSA in February 2022 was 1.05. Aside from annual dermatologic exams, he does not regularly do any other cancer screening at this time.    Family History: (Please see scanned pedigree for detailed family history information)    One brother was diagnosed with colon cancer at age 48, lung/brain cancer at age 57 (unknown if primary versus metastasis) and passed away at age 57; he had a history of smoking. He did not pursue genetic testing.    One brother is 67 and has been diagnosed with lung " cancer; he has a history of smoking.    One sister was diagnosed with breast cancer at age 65, followed by lung cancer (unknown if primary versus metastasis) and passed away at age 67; she had a history of smoking. She did not pursue genetic testing.    One sister is 72 and was diagnosed with breast cancer at age 60; she has not pursued genetic testing.    Willard's father was diagnosed with small cell lung cancer and passed away at age 66; he had a history of smoking.    One paternal aunt was diagnosed with lymphoma and passed away at age 86.    One paternal half brother (paternal grandmother's son) was diagnosed with and passed away from an unknown cancer in his 60's, thought to be related to his time in the .    Willard reports having no known maternal family history of cancer, but has limited information regarding some of these relatives.    His maternal ethnicity is [place]. His paternal ethnicity is [place]ian. There is no known Ashkenazi Restorationism ancestry on either side of his family.    Discussion:    Willard's personal and family history of breast and colon cancer is suggestive of a hereditary cancer syndrome.    We reviewed the features of sporadic, familial, and hereditary cancers. In looking at Willard's family history, it is possible that a cancer susceptibility gene is present as three of Willard's siblings have been diagnosed with related cancers (breast, colon); his brother was also diagnosed under age 50. That being said, Willard's other relatives have either not been diagnosed with a cancer or were diagnosed with a likely unrelated cancer (lymphoma, lung cancer). This likely reduces, but does not eliminate, the chance for a hereditary cancer syndrome in his family.    We discussed the natural history and genetics of hereditary colon and breast cancer, including Hernandez syndrome.     Hernandez syndrome can be caused by a mutation in one of five genes:  MLH1, MSH2, MSH6, PMS2, and EPCAM. The highest cancer risks associated  with Hernandez syndrome include colon, endometrial/uterine, gastric, and ovarian cancer. Other cancers have also been reported with Hernandez syndrome, including breast cancer.     We discussed that there are additional genes that could cause increased risk for the cancers in Willard's family. For example, mutations in the CHEK2 gene are associated with moderately increased risk for colon and breast cancer. As many of these genes present with overlapping features in a family and accurate cancer risk cannot always be established based upon the pedigree analysis alone, it would be reasonable for Willard to consider panel genetic testing to analyze multiple genes at once.    Based on his family history, Willard meets current National Comprehensive Cancer Network (NCCN) criteria for genetic testing of colon cancer risk genes (i.e. Hernandez syndrome genes, POLD1, POLE, CHEK2, etc.).      A detailed handout regarding these genes/syndromes and the information we discussed was provided to Willard at the end of our appointment and can be found in the after visit summary. Topics included: inheritance pattern, cancer risks, cancer screening recommendations, and also risks, benefits and limitations of testing.    We discussed that genetic testing for cancer susceptibility genes is typically most informative, though, when it is first performed on a family member with a personal history of cancer. Testing is available to Willard, but with limitations. If Willard pursues testing at this time and receives a negative result, this does not rule out the possibility of a hereditary cancer syndrome in him and/or his family. Despite these limitations and given that the majority of his relatives with cancer have passed away, Willard expressed interest in proceeding with testing himself.    We reviewed genetic testing options for hereditary colon, breast, and related cancers: actionable breast and colon cancer panel (Hereditary Breast and Gyn Cancers Guidelines-Based  Panel + Colorectal Cancer Panel) and expanded pan-cancer panels ([facility] Common Hereditary Cancers Panel or Multi-Cancer Panel). Willard expressed interest in learning as much information as possible from the testing. He opted for the [facility] Multi-Cancer Panel.  The [facility] Multi-Cancer Panel analyzes 84 genes associated with increased risk for cancer: AIP, ALK, APC, COREEN, AXIN2, BAP1, BARD1, BLM, BMPR1A, BRCA1, BRCA2, BRIP1, CASR,CDC73, CDH1, CDK4, CDKN1B, CDKN1C, CDKN2A, CEBPA, CHEK2, CTNNA1, DICER1,DIS3L2, EGFR, EPCAM, FH, FLCN, GATA2, GPC3, GREM1, HOXB13, HRAS, KIT, MAX,MEN1, MET, MITF, MLH1, MSH2, MSH3, MSH6, MUTYH, NBN, NF1, NF2, NTHL1,PALB2, PDGFRA, PHOX2B, PMS2, POLD1, POLE, POT1, XMXBI8D, PTCH1, PTEN, RAD50,RAD51C, RAD51D, RB1, RECQL4, RET, RUNX1, SDHA, SDHAF2, SDHB, SDHC, SDHD,SMAD4, SMARCA4, SMARCB1, SMARCE1, STK11, SUFU, TERC, TERT, [id number], TP53,TSC1, TSC2, VHL, WRN, WT1.  This panel includes genes associated with hereditary cancers across multiple major organ systems, including: breast and gynecologic (breast, ovarian, uterine), gastrointestinal (colorectal, gastric, pancreatic), endocrine (thyroid, paraganglioma/pheochromocytoma, parathyroid, pituitary), genitourinary (renal/urinary tract, prostate), skin (melanoma, basal cell carcinoma), brain/nervous system, sarcoma, and hematologic (myelodysplastic syndrome/leukemia).  Individuals who are found to carry a mutation in one of these genes are at increased risk of developing certain cancers/tumors. Depending on the gene mutation found, identifying those at elevated risk may guide implementation of additional screening, surveillance, and other interventions.    Consent was obtained over the video and Willard elected to schedule a lab appointment at his local [facility] at his earliest convenience. Once his blood is drawn, genetic testing using the Multi-Cancer Panel will be sent to [facility]. Of note, testing will begin with the Hernandez syndrome  genes, with reflex to the entire panel. Turn around time: 2-3 weeks after [facility] receives his blood sample.    Medical Management: For Willard, we reviewed that the information from genetic testing may determine:    additional cancer screening for which Willard may qualify (i.e. more frequent colonoscopies, regular upper endoscopies, annual urinalysis, more frequent dermatologic exams, etc.),    and targeted chemotherapies if he were to develop certain cancers in the future (i.e. immunotherapy for individuals with Hernandez syndrome, PARP inhibitors, etc.).     These recommendations and possible targeted chemotherapies will be discussed in detail once genetic testing is completed.     Plan:  1) Today Willard elected to proceed with genetic testing of the Hernandez syndromes genes, with reflex to the [employer] Multi-Cancer Panel, through [facility]. Willard will schedule a lab appointment at his earliest convenience.  2) The results should be available 2-3 weeks after M[facility] receives his blood sample.  3) Willard will be contacted to schedule a virtual visit to discuss the results.    Zo Cruz MS, [facility]  Licensed, Certified Genetic Counselor  Office: [phone]  Pager: [phone]

## 2022-05-10 NOTE — LETTER
"    5/10/2022         RE: Willard Manzano  [address] [facility] [address]        Dear Colleague,    Thank you for referring your patient, Willard Manzano, to the [facility]. Please see a copy of my visit note below.    5/10/2022    Referring Provider: Cassandra Villafana PA-C    Presenting Information:   Given concerns regarding the potential for COVID-19 exposure during a clinic visit, Willard elected for a video genetic counseling visit through the [facility] to discuss his family history of colon and breast cancer. We reviewed this history, cancer screening recommendations, and available genetic testing options.    Personal History:  Willard is a 57 year old male. Aside from \"several\" basal cell carcinomas removed from his arms and back since at age 52, he does not have any other personal history of cancer.    His most recent colonoscopy in February 2021 was normal and follow-up was recommended in five years. His prior exam in 2010 identified one polyp (colonic mucosa with lymphoid aggregate) and the exam in 2005 identified one inflammatory pseudopolyp. His most recent PSA in February 2022 was 1.05. Aside from annual dermatologic exams, he does not regularly do any other cancer screening at this time.    Family History: (Please see scanned pedigree for detailed family history information)    One brother was diagnosed with colon cancer at age 48, lung/brain cancer at age 57 (unknown if primary versus metastasis) and passed away at age 57; he had a history of smoking. He did not pursue genetic testing.    One brother is 67 and has been diagnosed with lung cancer; he has a history of smoking.    One sister was diagnosed with breast cancer at age 65, followed by lung cancer (unknown if primary versus metastasis) and passed away at age 67; she had a history of smoking. She did not pursue genetic testing.    One sister is 72 and was diagnosed with breast cancer at age 60; she has " not pursued genetic testing.    Willard's father was diagnosed with small cell lung cancer and passed away at age 66; he had a history of smoking.    One paternal aunt was diagnosed with lymphoma and passed away at age 86.    One paternal half brother (paternal grandmother's son) was diagnosed with and passed away from an unknown cancer in his 60's, thought to be related to his time in the .    Willard reports having no known maternal family history of cancer, but has limited information regarding some of these relatives.    His maternal ethnicity is [place]. His paternal ethnicity is [place]ian. There is no known Ashkenazi Orthodoxy ancestry on either side of his family.    Discussion:    Willard's personal and family history of breast and colon cancer is suggestive of a hereditary cancer syndrome.    We reviewed the features of sporadic, familial, and hereditary cancers. In looking at Willard's family history, it is possible that a cancer susceptibility gene is present as three of Willard's siblings have been diagnosed with related cancers (breast, colon); his brother was also diagnosed under age 50. That being said, Willard's other relatives have either not been diagnosed with a cancer or were diagnosed with a likely unrelated cancer (lymphoma, lung cancer). This likely reduces, but does not eliminate, the chance for a hereditary cancer syndrome in his family.    We discussed the natural history and genetics of hereditary colon and breast cancer, including Hernandez syndrome.     Hernandez syndrome can be caused by a mutation in one of five genes:  MLH1, MSH2, MSH6, PMS2, and EPCAM. The highest cancer risks associated with Hernandez syndrome include colon, endometrial/uterine, gastric, and ovarian cancer. Other cancers have also been reported with Hernandez syndrome, including breast cancer.     We discussed that there are additional genes that could cause increased risk for the cancers in Willard's family. For example, mutations in the CHEK2 gene  are associated with moderately increased risk for colon and breast cancer. As many of these genes present with overlapping features in a family and accurate cancer risk cannot always be established based upon the pedigree analysis alone, it would be reasonable for Willard to consider panel genetic testing to analyze multiple genes at once.    Based on his family history, Willard meets current National Comprehensive Cancer Network (NCCN) criteria for genetic testing of colon cancer risk genes (i.e. Hernandez syndrome genes, POLD1, POLE, CHEK2, etc.).      A detailed handout regarding these genes/syndromes and the information we discussed was provided to Willard at the end of our appointment and can be found in the after visit summary. Topics included: inheritance pattern, cancer risks, cancer screening recommendations, and also risks, benefits and limitations of testing.    We discussed that genetic testing for cancer susceptibility genes is typically most informative, though, when it is first performed on a family member with a personal history of cancer. Testing is available to Willard, but with limitations. If Willard pursues testing at this time and receives a negative result, this does not rule out the possibility of a hereditary cancer syndrome in him and/or his family. Despite these limitations and given that the majority of his relatives with cancer have passed away, Willard expressed interest in proceeding with testing himself.    We reviewed genetic testing options for hereditary colon, breast, and related cancers: actionable breast and colon cancer panel (Hereditary Breast and Gyn Cancers Guidelines-Based Panel + Colorectal Cancer Panel) and expanded pan-cancer panels ([facility] Common Hereditary Cancers Panel or Multi-Cancer Panel). Willard expressed interest in learning as much information as possible from the testing. He opted for the [facility] Multi-Cancer Panel.  The [facility] Multi-Cancer Panel analyzes 84 genes associated with  increased risk for cancer: AIP, ALK, APC, COREEN, AXIN2, BAP1, BARD1, BLM, BMPR1A, BRCA1, BRCA2, BRIP1, CASR,CDC73, CDH1, CDK4, CDKN1B, CDKN1C, CDKN2A, CEBPA, CHEK2, CTNNA1, DICER1,DIS3L2, EGFR, EPCAM, FH, FLCN, GATA2, GPC3, GREM1, HOXB13, HRAS, KIT, MAX,MEN1, MET, MITF, MLH1, MSH2, MSH3, MSH6, MUTYH, NBN, NF1, NF2, NTHL1,PALB2, PDGFRA, PHOX2B, PMS2, POLD1, POLE, POT1, UXABN4W, PTCH1, PTEN, RAD50,RAD51C, RAD51D, RB1, RECQL4, RET, RUNX1, SDHA, SDHAF2, SDHB, SDHC, SDHD,SMAD4, SMARCA4, SMARCB1, SMARCE1, STK11, SUFU, TERC, TERT, OBOF573, TP53,TSC1, TSC2, VHL, WRN, WT1.  This panel includes genes associated with hereditary cancers across multiple major organ systems, including: breast and gynecologic (breast, ovarian, uterine), gastrointestinal (colorectal, gastric, pancreatic), endocrine (thyroid, paraganglioma/pheochromocytoma, parathyroid, pituitary), genitourinary (renal/urinary tract, prostate), skin (melanoma, basal cell carcinoma), brain/nervous system, sarcoma, and hematologic (myelodysplastic syndrome/leukemia).  Individuals who are found to carry a mutation in one of these genes are at increased risk of developing certain cancers/tumors. Depending on the gene mutation found, identifying those at elevated risk may guide implementation of additional screening, surveillance, and other interventions.    Consent was obtained over the video and Willard elected to schedule a lab appointment at his local [facility] at his earliest convenience. Once his blood is drawn, genetic testing using the Multi-Cancer Panel will be sent to [facility]. Of note, testing will begin with the Hernandez syndrome genes, with reflex to the entire panel. Turn around time: 2-3 weeks after Murali receives his blood sample.    Medical Management: For Willard, we reviewed that the information from genetic testing may determine:    additional cancer screening for which Willard may qualify (i.e. more frequent colonoscopies, regular upper  endoscopies, annual urinalysis, more frequent dermatologic exams, etc.),    and targeted chemotherapies if he were to develop certain cancers in the future (i.e. immunotherapy for individuals with Hernandez syndrome, PARP inhibitors, etc.).     These recommendations and possible targeted chemotherapies will be discussed in detail once genetic testing is completed.     Plan:  1) Today Willard elected to proceed with genetic testing of the Hernandez syndromes genes, with reflex to the [facility]Cancer Panel, through [facility]. Willard will schedule a lab appointment at his earliest convenience.  2) The results should be available 2-3 weeks after Bolster receives his blood sample.  3) Willard will be contacted to schedule a virtual visit to discuss the results.    Zo Cruz MS, [facility] [place] [facility]  Licensed, Certified Genetic Counselor  Office: [phone]  Pager: [phone]

## 2022-05-16 NOTE — PATIENT INSTRUCTIONS
Assessing Cancer Risk  Only about 5-10% of cancers are thought to be due to an inherited cancer susceptibility gene.    These families often have:  Several people with the same or related types of cancer  Cancers diagnosed at a young age (before age 50)  Individuals with more than one primary cancer  Multiple generations of the family affected with cancer    Comprehensive Colon Cancer Panel  We each inherit two copies of every gene in our bodies: one from our mother, and one from our father.  Each gene has a specific job to do.  When a gene has a mistake or  mutation  in it, it does not work like it should.      This handout will review common hereditary colon cancer syndromes, and other genes related to an increased risk for colon cancer.  The genes that will be discussed in this handout are: APC, BMPR1A, CDH1, CHEK2, EPCAM, GREM1, MLH1, MSH2, MSH6, MUTYH, PMS2, POLD1, POLE, PTEN, SMAD4, STK11, and TP53.  These genes are clinically actionable, meaning there are published guidelines for cancer screening and management for individuals who are found to carry mutations in these genes. Inheriting a mutation does not mean a person will develop cancer, but it does significantly increase his or her risk above the general population risk.      Familial Adenomatous Polyposis (FAP)  FAP is a hereditary cancer syndrome caused by mutations in the APC gene. The condition is known to cause hundreds to thousands of adenomatous polyps in the colon, creating a  carpet  of polyps. Some individuals have what is called attenuated FAP (AFAP), a milder form of FAP with fewer polyps and typically later onset. Individuals with an APC gene mutation are at an increased risk for colon, thyroid, and duodenal cancers, as well as several other types of cancer1.  Other features of this condition may include: osteomas, dental anomalies, benign skin lesions, CHRPE ( freckle  on the inside of the eye), and desmoid tumors.      Lifetime Cancer  Risks     Cancer Type General Population FAP   Colon  5% near 100%   Thyroid (papillary) 1% 1-12%   Duodenal <1% 5%   Liver  <1% 1-2% before age 5   Pancreas <1% 1%   Stomach <1% 1%       Juvenile Polyposis Syndrome (JPS)  JPS is characterized by hamartomatous polyps, called juvenile polyps, in the gastrointestinal tract.  Juvenile  refers to the type of polyps seen in this hereditary cancer condition, not the age of onset. Currently, mutations in two genes are known to cause JPS: BMPR1A and SMAD4. Of individuals clinically diagnosed with JPS, only 40% have an identifiable mutation in one of these genes, suggesting there are other genes that cause JPS that have not been discovered yet. Individuals with JPS are at an increased risk for colon cancer and stomach cancer 2,3,4. Pancreatic and small bowel cancers have also been reported in JPS, but the actual risks are unknown.         Lifetime Cancer Risks    Cancer Type General Population JPS   Colon 5% 40-50%   Gastric/Duodenal <1% 10-21%     Some individuals with SMAD4 mutations have a condition called JPS/HHT (Juvenile Polyposis/Hereditary Hemorrhagic Telangiectasia) where in addition to JPS, individuals may have nose bleeds and clotting issues.     Hereditary Diffuse Gastric Cancer (HDGC)  Currently, mutations in one gene are known to cause Hereditary Diffuse Gastric Cancer: CDH1.  Individuals with HDGC are at increased risk for diffuse gastric cancer and lobular breast cancer. Of people diagnosed with HDGC, 30-50% have a mutation in the CDH1 gene.  This suggests there are likely mutations in other genes that may cause HDGC that have not been identified yet. Individuals with HDGC may also be at increased risk for colon cancer.      Lifetime Cancer Risks    Cancer Type General Population HDGC   Diffuse Gastric <1% 67-83%   Breast 12% 39-52%     Hernandez syndrome  Mutations in five different genes are known to cause Hernandez syndrome: MLH1, MSH2, MSH6, PMS2, and EPCAM.  Individuals with Hernandez syndrome have an increased risk for colon, uterine, ovarian, small bowel, stomach, urinary tract, and brain cancer, as well as several other types of cancer. The exact lifetime cancer risks are dependent upon the gene in which the mutation was identified.      Lifetime Cancer Risks    Cancer Type General Population Hernandez syndrome   Colon 5% 12-52%   Uterine 2-3% Up to 57%   Stomach <1% Up to 16%   Ovarian 2% Up to 38%   Urinary tract <1% 1-7%   Hepatobiliary tract <1% 1-4%   Small bowel <1% Up to 11%   Brain/CNS <1% Not well established   Pancreas <1% Up to 6%       MUTYH-Associated Polyposis (MAP)  MAP is a hereditary cancer syndrome caused by mutations in the MUTYH gene. Unlike the other hereditary cancer genes discussed in this handout, two mutations in the MUTYH gene cause MAP and increase cancer risk. Those affected with MAP typically have between  adenomatous polyps. This syndrome also increases the risk for colon and duodenal cancer. Current research suggests that other cancers may be associated with MUTYH mutations, as well. The table below includes the risk that someone with two MUTYH gene mutations would develop cancer in their lifetime; of note, there is also an increased colon cancer risk for individuals who carry only one MUTYH gene mutation5,6,7.       Lifetime Cancer Risks    Cancer Type General Population MAP   Colon 5% %   Duodenal  <1% 5%       Cowden syndrome  Cowden syndrome is a hereditary condition that increases the risk for breast, thyroid, endometrial, colon, and kidney cancer.  A single mutation in the PTEN gene causes Cowden syndrome and increases cancer risk.  The table below shows the chance that someone with a PTEN mutation would develop cancer in their lifetime8,9.  Other benign features seen in some individuals with Cowden syndrome include benign skin lesions (facial papules, keratoses, lipomas), learning disabilities, autism, thyroid nodules,  hamartomatous colon polyps, and larger head size.      Lifetime Cancer Risks   Cancer Type General Population Cowden Syndrome   Breast 12% 25-50%*   Thyroid 1% 35%   Renal 1-2% 35%   Endometrial 2-3% 28%   Colon 5% 9%   Melanoma 2-3% >5%     *One recent study found breast cancer risk to be increased to 85%    Peutz-Jeghers syndrome (PJS)  PJS is a hereditary cancer syndrome caused by mutations in the STK11 gene. This condition can be distinguished from other hereditary syndromes by the presence of hamartomatous polyps in the gastrointestinal tract and freckles present in unusual places such as the hands, feet, neck, and lips. Individuals with Peutz-Jeghers syndrome have an increased risk for colon, breast, pancreatic, and other cancers3.  Men are at risk for testicular tumors which can affect hormones in the body. Women are at risk for sex cord tumors of the ovaries and a rare aggressive type of cervical cancer.     Lifetime Cancer Risks    Cancer Type General Population PJS   Breast 12% 45-50%   Colon 5% 39%   Stomach <1% 29%   Pancreas 1.5% 11-36%   Small Intestine <1% 13%     Ovarian  2%  18%   Lung 6-7% 15-17%     Additional Genes Associated with Increased Colon Cancer Risk  CHEK2  CHEK2 is a moderate-risk breast cancer gene.  Women who have a mutation in CHEK2 have around a 2-fold increased risk for breast cancer compared to the general population, and this risk may be higher depending upon family history.12,13,14.  Mutations in CHEK2 have also been shown to increase the risk of a number of other cancers, including colon and prostate, however these cancer risks are currently not well understood.     GREM1  GREM1 is a moderate-risk colon polyposis gene. Duplications of this gene are more commonly found in individuals with Ashkenazi Buddhist [id number]. Mutations in GREM1 are associated with colon polyps and therefore an increased risk of colon cancer; however the estimated cancer risk is not well [id number].      POLD1 and POLE  POLD1 and POLE are moderate-risk colon cancer genes. Carriers of a mutation in one of these genes increases the lifetime risk of colorectal [id number],18,19,20. Mutations in these genes may also be associated with increased risk for other cancers including: endometrial cancer, duodenal adenomas and carcinomas, and brain tumors.    TP53  Li Fraumeni syndrome (LFS) is a hereditary cancer predisposition syndrome. LFS is caused by a mutation in the TP53 gene. A single mutation in one of the copies of TP53 increases the risk for multiple cancers. Individuals with LFS are at an increased risk for developing cancer at a young age. The general lifetime risk for development of cancer is 50% by age 30 and 90% by age 60.      Core Cancers: Sarcomas, Breast, Brain, Lung, Leukemias/Lymphomas, Adrenocortical carcinomas  Other Cancers: Gastrointestinal, Thyroid, Skin, Genitourinary    Genetic Testing  Genetic testing involves a simple blood test and will look at the genetic information in genes associated with an increased risk of colon cancer. The tests look for any harmful mutations that are associated with increased cancer risk.  If possible, it is recommended that the person(s) who has had cancer be tested before other family members.  That person will give us the most useful information about whether or not a specific gene mutation is associated with the cancer in the family.     Results  There are three possible results from genetic testing:  Positive--a harmful mutation was identified  Negative--no mutation was identified  Variant of unknown significance--a variation in one of the genes was identified, but it is unclear how this impacts cancer risk in the family  Advantages and Disadvantages  There are advantages and disadvantages to genetic testing of these genes.    Advantages  May clarify your cancer risk  Can help you make medical decisions  May explain the cancers in your family  May give useful  information to your family members (if you share your results)    Disadvantages  Possible negative emotional impact of learning about inherited cancer risk  Uncertainty in interpreting a negative test result in some situations  Possible genetic discrimination concerns (see below)    Inheritance   Most mutations in the genes outlined above are inherited in an autosomal dominant pattern.  This means that if a parent has a mutation, each of his or her children will have a 50% chance of inheriting that same mutation.  Therefore, each child--male or female--would have a 50% chance of being at increased risk for developing cancer.                                            Image obtained from [employer], 2013     In the case of MUTYH-Associated Polyposis (MAP) this hereditary cancer syndrome is inherited in an autosomal recessive pattern. This means that each parent of an individual with MAP is a carrier of MAP, meaning that they have only one mutation in MUTYH. They still have one functioning copy of their gene.  Carriers are at a slightly higher risk for colon cancer than the general population. If each parent is a carrier for MAP, they have a 25% of having a child who is affected with MAP, meaning the child inherited both gene mutations - one from each parent.       Image obtained from [employer], 2016    Genetic Information Nondiscrimination Act (ADRIAN)  ADRIAN is a federal law that protects individuals from health insurance or employment discrimination based on a genetic test result alone.  Although rare, there are currently no legal protections in terms of life insurance, long term care, or disability insurances.  Visit the National Human Genome Research [place] at Genome.gov/13161910 to learn more.    Reducing Cancer Risk  Each of the genes listed within this handout have nationally recognized cancer screening guidelines that would be recommended for individuals who test positive.  In  addition to increased cancer screening, surgeries may be offered or recommended to reduce cancer risk in certain cases.  Recommendations are based upon an individual s genetic test result as well as their personal and family history of cancer.    Questions to Think About Regarding Genetic Testing  What effect will the test result have on me and my relationship with my family members if I have an inherited gene mutation?  If I don t have a gene mutation?  Should I share my test results, and how will my family react to this news, which may also affect them?  Are my children ready to learn new information that may one day affect their own health?    Resources    PTEN World [email or web address]   No Stomach for Cancer, Inc. nostomachforcancer.[email or web address]   Stomach Cancer Relief Network scrnet.org   Collaborative Group of the Americas on Inherited Colorectal Cancer (CGA) cgaicc.com   Cancer Care cancercare.org   American Cancer Society (ACS) cancer.org   National Cancer [place] (NCI) cancer.org   Hernandez Syndrome International [email or web address]       Please call us if you have any questions or concerns.     Cancer Risk Management Program [phone] ([phone])  Yovanny Ortega, MS [facility] [phone]  Alaina Patton, MS, [facility] [phone]  Ginette Drake, MS, [facility]  [phone]  Brittany Cordero, MS, [facility]  [phone]  Juani Leon, MS, [facility]  [phone]  Zo Cruz, MS, [facility] [phone]  Jacy Miranda, MS, [facility] [phone]    References    Isis BARBOZA, Isis J, Chaka G, Jean Claude E, Anahi J, et al. The Prevalence of thyroid cancer and benign thyroid disease in patients with familial adenomatous polyposis may be higher than previously recognized. Clin Colorectal Cancer. 2012;11:304-308.  Jf L, Van Marilu A, Niranjan L, Ha C, Fanny K, et al. Risk of colorectal cancer in juvenile polyposis. Gut. 2007;56:965-967.  Jonathan FG, Jameel MN, Josh CA. Colorectal cancer risk in hamartomatous polyposis syndromes. World  Journal of Gastrointestinal Surgery. 2015;27:25-32  Jo-Ann WHITE. Guidance on gastrointestinal surveillance for hereditary non-polyposis colorectal cancer, familial adenomatous polypolis, juvenile polyposis, and Peutz-Jeghers syndrome. Gut. 2002;51:21-27.  Mario AK, Ynadel BHAVNA, Sofía JG et al. Risk of extracolonic cancers for people with biallelic and monoallelic mutations in MUTYH. Int J of Cancer. 2016;139:6789-7453.  Christian S, Madison S, Uyen H, Massimo K, Elliott M, et al. MUTYH-associated polyposis: 70 of 71 patients with biallelic mutations present with an attenuated or atypical phenotype. Int J of Cancer. 2006;119:807-814.  Daria G, Mendez F, Surendra I, Julio César M, Daria H, et al. MUTYH mutation carriers have increased breast cancer risk. Cancer. 2012;5527-7260.  Shields MH, Alexandro J, Emiliana J, Srinivas LA, Suraj MS, Eng C. Lifetime cancer risks in individuals with germline PTEN mutations. Clin Cancer Res. 2012;18:400-7.  Franchescaki R. Cowden Syndrome: A Critical Review of the Clinical Literature. J Vandana . 2009:18:13-27.  National Comprehensive Cancer Network. Clinical practice guidelines in oncology, colorectal cancer screening. Available online (registration required). 2013.  National Cancer [place]. SEER Cancer Stat Fact Sheets.  December 2013.  CHEK2 Breast Cancer Case-Control Consortium. CHEK2*1100delC and susceptibility to breast cancer: A collaborative analysis involving 10,860 breast cancer cases and 9,065 controls from 10 studies. Am J Hum Vandana, 74 (2004), pp. 6051-0507  Dawna T, Lee S, Alicia K, et al. Spectrum of Mutations in BRCA1, BRCA2, CHEK2, and TP53 in Families at High Risk of Breast Cancer. PIYUSH. 2006;295(12):6963-3522.  Jayshree WALDRON, Taylor D, Cherelle A, et al. Risk of breast cancer in women with a CHEK2 mutation with and without a family history of breast cancer. J Clin Oncol. 2011;29:1734-2701.  Nikos CAMERON, John BARBOZA, Aaron CAMERON, Alejandro FLORES, Veena MAGALLANES et al. Defining the  polyposis/colorectal cancer phenotype associated with the GREM1 duplication: counseling and management guidelines. Vandana .Res. 2016;98:1-5.  Jacey E, Shaista S, Jayson A, Nelly Jorgensen, et al. Hereditary mixed polyposis syndrome is caused by a 40kb upstream duplication that leads to increased and ectopic expression of the BMP antagonist GREM1. Tish Vandana. 2015;44:699-703.  VANITA Kenny. et al. Germline mutations affecting the proofreading domains of POLE and POLD1 predispose to colorectal adenomas and carcinomas. Tish. Vandana. 45, 136-44 (2013).  BARRETT San. et al. POLE and POLD1 mutations in 529 sharmin with familial colorectal cancer and/or polyposis: review of reported cases and recommendations for genetic testing and surveillance. Vandana. Med. (2015). doi:10.1038/gim.2015.75  CHANDRIKA Moreno. et al. New insights into POLE and POLD1 germline mutations in familial colorectal cancer and polyposis. Hum. Mol. Vandana. 23, 9861-12 (2014).  ANNABEL Tian. et al. Frequency and phenotypic spectrum of germline mutations in POLE and seven other polymerase genes in 266 patients with colorectal adenomas and carcinomas. Int. J. Cancer 137, 320-31 (2015).

## 2022-05-18 ENCOUNTER — LAB (OUTPATIENT)
Dept: LAB | Facility: CLINIC | Age: 57
End: 2022-05-18
Payer: COMMERCIAL

## 2022-05-18 ENCOUNTER — OFFICE VISIT (OUTPATIENT)
Dept: CARDIOLOGY | Facility: CLINIC | Age: 57
End: 2022-05-18
Payer: COMMERCIAL

## 2022-05-18 VITALS
BODY MASS INDEX: 30.14 KG/M2 | WEIGHT: 210.5 LBS | HEIGHT: 70 IN | DIASTOLIC BLOOD PRESSURE: 75 MMHG | SYSTOLIC BLOOD PRESSURE: 123 MMHG | HEART RATE: 75 BPM

## 2022-05-18 DIAGNOSIS — E55.9 VITAMIN D DEFICIENCY: ICD-10-CM

## 2022-05-18 DIAGNOSIS — Z80.3 FAMILY HISTORY OF MALIGNANT NEOPLASM OF BREAST: ICD-10-CM

## 2022-05-18 DIAGNOSIS — R93.1 ELEVATED CORONARY ARTERY CALCIUM SCORE: ICD-10-CM

## 2022-05-18 DIAGNOSIS — R91.1 PULMONARY NODULE: Primary | ICD-10-CM

## 2022-05-18 DIAGNOSIS — R07.89 ATYPICAL CHEST PAIN: ICD-10-CM

## 2022-05-18 DIAGNOSIS — Z80.0 FAMILY HISTORY OF COLON CANCER: ICD-10-CM

## 2022-05-18 DIAGNOSIS — Z82.49 FAMILY HISTORY OF ISCHEMIC HEART DISEASE: ICD-10-CM

## 2022-05-18 DIAGNOSIS — R73.01 ELEVATED FASTING GLUCOSE: ICD-10-CM

## 2022-05-18 LAB
DEPRECATED CALCIDIOL+CALCIFEROL SERPL-MC: 41 UG/L (ref 20–75)
HBA1C MFR BLD: 5.9 % (ref 0–5.6)

## 2022-05-18 PROCEDURE — 93000 ELECTROCARDIOGRAM COMPLETE: CPT | Performed by: INTERNAL MEDICINE

## 2022-05-18 PROCEDURE — 82306 VITAMIN D 25 HYDROXY: CPT

## 2022-05-18 PROCEDURE — 83036 HEMOGLOBIN GLYCOSYLATED A1C: CPT

## 2022-05-18 PROCEDURE — 80061 LIPID PANEL: CPT

## 2022-05-18 PROCEDURE — 36415 COLL VENOUS BLD VENIPUNCTURE: CPT

## 2022-05-18 PROCEDURE — 99205 OFFICE O/P NEW HI 60 MIN: CPT | Performed by: INTERNAL MEDICINE

## 2022-05-18 PROCEDURE — 99000 SPECIMEN HANDLING OFFICE-LAB: CPT

## 2022-05-18 RX ORDER — LORATADINE 10 MG/1
10 TABLET ORAL DAILY
COMMUNITY

## 2022-05-18 NOTE — PROGRESS NOTES
HPI:     This is a 57 year old male with PMH significant for HLD here for evaluation. Has been having shortness of breath with exertion and occasional nagging chest pressure, both at rest and with stress, left sided no arm or jaw radiation. No history of hypertension. Also has leg and hand cramping on occasion, mostly at night with the legs. Does not have standing muscle pain.    Brother with heart attack (sudden) in 50s and 60s.     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 Second hand smoke exposure is significant. [employer] ship in the 70s.    We reviewed studies performed recently. He has Calcium score as follows:    CORONARY ARTERY CALCIUM SCORES:      Left main coronary artery: 0  Left anterior descending coronary artery: 22.1  Circumflex coronary artery: 0   Right coronary artery: 0     TOTAL CALCIUM SCORE: 0    Has not had echocardiogram study. He did have notable small pulmonary nodules on his CT scan that were < 6 mm. Recommended one year follow up with high risk patients.    ASSESSMENT/PLAN:    1. Chest pain and shortness of breath in setting of calc score of 22, confined to the left anterior descending artery.   -given symptoms will obtain a nuclear treadmill test, if significant ischemia then will obtain coronary angiogram  -echocardiogram  -follow up with WARNER in one month to review, if any abnormalities can recommend further testing    2. HLD:   -LDL is in the 120s, will check additional lipid panel (last one 9 months ago)  -consider CK if ongoing symptoms for statin induced myalgia  -on fibrate for triglyceridemia, well controlled  -LDL goal < 100 mg/dl, can consider an increase statin if needed (40 mg daily) but would check LFTs, CPK first    3. Pulmonary nodules: will obtain CT in one year given second hand smoke history    Follow up with WARNER in one month, one year with me or sooner depending on above.    Cecilia Cox MD MSC  [facility]    Total time spent on review of  records, evaluation of patient, imaging review and documentation 60 minutes.    PAST MEDICAL HISTORY  Past Medical History:   Diagnosis Date     Arthritis 1/2011     Blood clotting disorder (H)      Cancer (H)     Skin     Chiari malformation type I (H)      Elevated fasting glucose 1/19/2012     Headaches, migraine      Headaches, migraine      Other and unspecified hyperlipidemia      Pedal edema 6/10/2021     Rotator cuff tear 1/22/2012     Rotator cuff tear 1/22/2012     Rotator cuff tendinitis 1/2011    Inj. Dr. Cheney.      Spinal headache 2007    spontaneous spinal fluid leak - 2 blood patches     Urethral polyp 6-24-10    Dr. Card     Urethral polyp 6/24/2010    Dr. Card      Varicose veins of both lower extremities 6/10/2021       CURRENT MEDICATIONS  Current Outpatient Medications   Medication Sig Dispense Refill     aspirin 81 MG EC tablet Take 81 mg by mouth daily       fenofibrate (TRICOR) 145 MG tablet TAKE 1 TABLET(145 MG) BY MOUTH DAILY 90 tablet 2     loratadine (CLEAR-ATADINE) 10 MG tablet Take 10 mg by mouth daily       metFORMIN (GLUCOPHAGE-XR) 500 MG 24 hr tablet TAKE 1 TABLET(500 MG) BY MOUTH DAILY WITH DINNER 90 tablet 0     Omega-3 Fatty Acids (FISH OIL PO)        omeprazole (PRILOSEC) 40 MG DR capsule TAKE 1 CAPSULE(40 MG) BY MOUTH DAILY 30 TO 60 MINUTES BEFORE A MEAL 90 capsule 3     simvastatin (ZOCOR) 20 MG tablet TAKE 1 TABLET(20 MG) BY MOUTH AT BEDTIME 90 tablet 3     meloxicam (MOBIC) 15 MG tablet TAKE 1 TABLET(15 MG) BY MOUTH DAILY 90 tablet 0       PAST SURGICAL HISTORY:  Past Surgical History:   Procedure Laterality Date     ABDOMEN SURGERY  2006    Umbilical Hernia repair     BIOPSY      arms cancer, 2 spots     COLONOSCOPY  12/29/10    non-adenomatous polyp. Advised 5 yr f/u.      COLONOSCOPY N/A 2/19/2021    Procedure: COLONOSCOPY;  Surgeon: Broderick Rivera MD;  Location:  GI     HC REMOVAL OF TONSILS,<13 Y/O       HERNIA REPAIR  2006     ORTHOPEDIC SURGERY  2018     Rotator Cuff Repair     SURGICAL HISTORY OF -   2006    umbilical hernia repair with mesh     ZZHC COLONOSCOPY THRU STOMA, DIAGNOSTIC  08    normal. 3 yr f/u due to brother CA age 43.     ZZHC VASECTOMY UNILAT/BILAT W POSTOP SEMEN         ALLERGIES     Allergies   Allergen Reactions     Mold Shortness Of Breath     Cats      Congestion and drainage     Grass Cough     No Known Allergies        FAMILY HISTORY  Family History   Problem Relation Age of Onset     Diabetes Mother         dx in her 40's. overwt. Now IDDM.     Hypertension Mother      Lipids Mother      Gallbladder Disease Mother      Cancer Father          lung CA with brain mets age 65     Other Cancer Father         Lung     Substance Abuse Father         Alcoholism     Other Cancer Brother         Lung     Cardiovascular Brother         Loi. Brother MI at age 49. Had another stent at age 52     Diabetes Sister      Family History Negative Sister      Allergies Son      Substance Abuse Son      Family History Negative Son      Family History Negative Daughter      Breast Cancer Sister         Dx bilateral breast CA at age 56.      Other Cancer Sister         Lung     Breast Cancer Sister         (pronounced Muh-jay)     Colon Cancer Brother         Diagnosed at age 40s     Other Cancer Brother         Lung, mets to brain     Substance Abuse Son         Alcoholism         SOCIAL HISTORY  Social History     Socioeconomic History     Marital status:      Spouse name: Not on file     Number of children: Not on file     Years of education: Not on file     Highest education level: Not on file   Occupational History     Not on file   Tobacco Use     Smoking status: Never Smoker     Smokeless tobacco: Never Used     Tobacco comment: Some sporadic use 40+ years ago- cigars   Vaping Use     Vaping Use: Never used   Substance and Sexual Activity     Alcohol use: Yes     Comment: 1-2 drinks a week     Drug use: No     Sexual activity: Yes      "Partners: Female     Birth control/protection: Male Surgical     Comment: I'm fixed   Other Topics Concern     Parent/sibling w/ CABG, MI or angioplasty before 65F 55M? Yes     Comment: Brother   Social History Narrative     Not on file     Social Determinants of Health     Financial Resource Strain: Not on file   Food Insecurity: Not on file   Transportation Needs: Not on file   Physical Activity: Not on file   Stress: Not on file   Social Connections: Not on file   Intimate Partner Violence: Not on file   Housing Stability: Not on file       ROS:     See HPI      EXAM:  /75   Pulse 75   Ht 1.778 m (5' 10\")   Wt 95.5 kg (210 lb 8 oz)   BMI 30.20 kg/m    In general, the patient is a pleasant male in no apparent distress.    HEENT: NC/AT.  PERRLA.  EOMI.  Sclerae white, not injected.   Neck: No adenopathy.  No thyromegaly. Carotids +2/2 bilaterally without bruits.  No jugular venous distension.   Heart: RRR. Normal S1, S2 splits physiologically. No murmur, rub, click, or gallop. The PMI is in the 5th ICS in the midclavicular line.  Abdomen: Soft, nontender, nondistended. No organomegaly. No AAA.  No bruits.   Extremities: No clubbing, cyanosis, or edema.    Vascular: No bruits are noted.    Labs:  LIPID RESULTS:  Lab Results   Component Value Date    CHOL 191 08/17/2021    CHOL 216 (H) 12/07/2020    HDL 39 (L) 08/17/2021    HDL 42 12/07/2020     (H) 08/17/2021     (H) 12/07/2020    TRIG 123 08/17/2021    TRIG 92 12/07/2020    CHOLHDLRATIO 4.7 08/20/2014    NHDL 152 (H) 08/17/2021    NHDL 174 (H) 12/07/2020       LIVER ENZYME RESULTS:  Lab Results   Component Value Date    AST 25 12/07/2020    ALT 40 12/07/2020       CBC RESULTS:  Lab Results   Component Value Date    WBC 5.2 02/18/2022    WBC 5.2 12/07/2020    RBC 5.64 02/18/2022    RBC 5.34 12/07/2020    HGB 16.6 02/18/2022    HGB 15.8 12/07/2020    HCT 49.9 02/18/2022    HCT 46.5 12/07/2020    MCV 89 02/18/2022    MCV 87 12/07/2020    MCH " 29.4 02/18/2022    MCH 29.6 12/07/2020    MCHC 33.3 02/18/2022    MCHC 34.0 12/07/2020    RDW 13.6 02/18/2022    RDW 13.4 12/07/2020     02/18/2022     12/07/2020       BMP RESULTS:  Lab Results   Component Value Date     12/07/2020    POTASSIUM 4.1 12/07/2020    CHLORIDE 104 12/07/2020    CO2 25 12/07/2020    ANIONGAP 6 12/07/2020    GLC 89 12/07/2020    BUN 23 12/07/2020    CR 1.12 12/07/2020    GFRESTIMATED 72 12/07/2020    GFRESTBLACK 84 12/07/2020    MAR 9.1 12/07/2020        A1C RESULTS:  Lab Results   Component Value Date    A1C 5.9 (H) 05/18/2022    A1C 6.1 (H) 12/07/2020

## 2022-05-18 NOTE — LETTER
5/18/2022    Cassandra Villafana PA-C  [id number]  [facility] [address]    RE: Willard ISAAC Jose Juan       Dear Colleague,     I had the pleasure of seeing Willard Manzano in the [facility].        HPI:     This is a 57 year old male with PMH significant for HLD here for evaluation. Has been having shortness of breath with exertion and occasional nagging chest pressure, both at rest and with stress, left sided no arm or jaw radiation. No history of hypertension. Also has leg and hand cramping on occasion, mostly at night with the legs. Does not have standing muscle pain.    Brother with heart attack (sudden) in 50s and 60s.     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 Second hand smoke exposure is significant. [employer] ship in the 70s.    We reviewed studies performed recently. He has Calcium score as follows:    CORONARY ARTERY CALCIUM SCORES:      Left main coronary artery: 0  Left anterior descending coronary artery: 22.1  Circumflex coronary artery: 0   Right coronary artery: 0     TOTAL CALCIUM SCORE: 0    Has not had echocardiogram study. He did have notable small pulmonary nodules on his CT scan that were < 6 mm. Recommended one year follow up with high risk patients.    ASSESSMENT/PLAN:    1. Chest pain and shortness of breath in setting of calc score of 22, confined to the left anterior descending artery.   -given symptoms will obtain a nuclear treadmill test, if significant ischemia then will obtain coronary angiogram  -echocardiogram  -follow up with WARNER in one month to review, if any abnormalities can recommend further testing    2. HLD:   -LDL is in the 120s, will check additional lipid panel (last one 9 months ago)  -consider CK if ongoing symptoms for statin induced myalgia  -on fibrate for triglyceridemia, well controlled  -LDL goal < 100 mg/dl, can consider an increase statin if needed (40 mg daily) but would check LFTs, CPK first    3. Pulmonary nodules: will obtain CT in  one year given second hand smoke history    Follow up with WARNER in one month, one year with me or sooner depending on above.    Cecilia Cox MD MSC  [facility]    Total time spent on review of records, evaluation of patient, imaging review and documentation 60 minutes.    PAST MEDICAL HISTORY  Past Medical History:   Diagnosis Date     Arthritis 1/2011     Blood clotting disorder (H)      Cancer (H)     Skin     Chiari malformation type I (H)      Elevated fasting glucose 1/19/2012     Headaches, migraine      Headaches, migraine      Other and unspecified hyperlipidemia      Pedal edema 6/10/2021     Rotator cuff tear 1/22/2012     Rotator cuff tear 1/22/2012     Rotator cuff tendinitis 1/2011    Inj. Dr. Cheney.      Spinal headache 2007    spontaneous spinal fluid leak - 2 blood patches     Urethral polyp 6-24-10    Dr. Card     Urethral polyp 6/24/2010    Dr. Card      Varicose veins of both lower extremities 6/10/2021       CURRENT MEDICATIONS  Current Outpatient Medications   Medication Sig Dispense Refill     aspirin 81 MG EC tablet Take 81 mg by mouth daily       fenofibrate (TRICOR) 145 MG tablet TAKE 1 TABLET(145 MG) BY MOUTH DAILY 90 tablet 2     loratadine (CLEAR-ATADINE) 10 MG tablet Take 10 mg by mouth daily       metFORMIN (GLUCOPHAGE-XR) 500 MG 24 hr tablet TAKE 1 TABLET(500 MG) BY MOUTH DAILY WITH DINNER 90 tablet 0     Omega-3 Fatty Acids (FISH OIL PO)        omeprazole (PRILOSEC) 40 MG DR capsule TAKE 1 CAPSULE(40 MG) BY MOUTH DAILY 30 TO 60 MINUTES BEFORE A MEAL 90 capsule 3     simvastatin (ZOCOR) 20 MG tablet TAKE 1 TABLET(20 MG) BY MOUTH AT BEDTIME 90 tablet 3     meloxicam (MOBIC) 15 MG tablet TAKE 1 TABLET(15 MG) BY MOUTH DAILY 90 tablet 0       PAST SURGICAL HISTORY:  Past Surgical History:   Procedure Laterality Date     ABDOMEN SURGERY  2006    Umbilical Hernia repair     BIOPSY      arms cancer, 2 spots     COLONOSCOPY  12/29/10    non-adenomatous polyp. Advised 5 yr  f/u.      COLONOSCOPY N/A 2021    Procedure: COLONOSCOPY;  Surgeon: Broderick Rivera MD;  Location: [facility]     HC REMOVAL OF TONSILS,<9 Y/O       HERNIA REPAIR       ORTHOPEDIC SURGERY  2018    Rotator Cuff Repair     SURGICAL HISTORY OF -   2006    umbilical hernia repair with mesh     ZZHC COLONOSCOPY THRU STOMA, DIAGNOSTIC  08    normal. 3 yr f/u due to brother CA age 43.     ZZHC VASECTOMY UNILAT/BILAT W POSTOP SEMEN         ALLERGIES     Allergies   Allergen Reactions     Mold Shortness Of Breath     Cats      Congestion and drainage     Grass Cough     No Known Allergies        FAMILY HISTORY  Family History   Problem Relation Age of Onset     Diabetes Mother         dx in her 40's. overwt. Now IDDM.     Hypertension Mother      Lipids Mother      Gallbladder Disease Mother      Cancer Father          lung CA with brain mets age 65     Other Cancer Father         Lung     Substance Abuse Father         Alcoholism     Other Cancer Brother         Lung     Cardiovascular Brother         Loi. Brother MI at age 49. Had another stent at age 52     Diabetes Sister      Family History Negative Sister      Allergies Son      Substance Abuse Son      Family History Negative Son      Family History Negative Daughter      Breast Cancer Sister         Dx bilateral breast CA at age 56.      Other Cancer Sister         Lung     Breast Cancer Sister         (pronounced Muh-jay)     Colon Cancer Brother         Diagnosed at age 40s     Other Cancer Brother         Lung, mets to brain     Substance Abuse Son         Alcoholism         SOCIAL HISTORY  Social History     Socioeconomic History     Marital status:      Spouse name: Not on file     Number of children: Not on file     Years of education: Not on file     Highest education level: Not on file   Occupational History     Not on file   Tobacco Use     Smoking status: Never Smoker     Smokeless tobacco: Never Used     Tobacco comment: Some  "sporadic use 40+ years ago- cigars   Vaping Use     Vaping Use: Never used   Substance and Sexual Activity     Alcohol use: Yes     Comment: 1-2 drinks a week     Drug use: No     Sexual activity: Yes     Partners: Female     Birth control/protection: Male Surgical     Comment: I'm fixed   Other Topics Concern     Parent/sibling w/ CABG, MI or angioplasty before 65F 55M? Yes     Comment: Brother   Social History Narrative     Not on file     Social Determinants of Health     Financial Resource Strain: Not on file   Food Insecurity: Not on file   Transportation Needs: Not on file   Physical Activity: Not on file   Stress: Not on file   Social Connections: Not on file   Intimate Partner Violence: Not on file   Housing Stability: Not on file       ROS:     See HPI      EXAM:  /75   Pulse 75   Ht 1.778 m (5' 10\")   Wt 95.5 kg (210 lb 8 oz)   BMI 30.20 kg/m    In general, the patient is a pleasant male in no apparent distress.    HEENT: NC/AT.  PERRLA.  EOMI.  Sclerae white, not injected.   Neck: No adenopathy.  No thyromegaly. Carotids +2/2 bilaterally without bruits.  No jugular venous distension.   Heart: RRR. Normal S1, S2 splits physiologically. No murmur, rub, click, or gallop. The PMI is in the 5th ICS in the midclavicular line.  Abdomen: Soft, nontender, nondistended. No organomegaly. No AAA.  No bruits.   Extremities: No clubbing, cyanosis, or edema.    Vascular: No bruits are noted.    Labs:  LIPID RESULTS:  Lab Results   Component Value Date    CHOL 191 08/17/2021    CHOL 216 (H) 12/07/2020    HDL 39 (L) 08/17/2021    HDL 42 12/07/2020     (H) 08/17/2021     (H) 12/07/2020    TRIG 123 08/17/2021    TRIG 92 12/07/2020    CHOLHDLRATIO 4.7 08/20/2014    NHDL 152 (H) 08/17/2021    NHDL 174 (H) 12/07/2020       LIVER ENZYME RESULTS:  Lab Results   Component Value Date    AST 25 12/07/2020    ALT 40 12/07/2020       CBC RESULTS:  Lab Results   Component Value Date    WBC 5.2 02/18/2022    WBC " 5.2 12/07/2020    RBC 5.64 02/18/2022    RBC 5.34 12/07/2020    HGB 16.6 02/18/2022    HGB 15.8 12/07/2020    HCT 49.9 02/18/2022    HCT 46.5 12/07/2020    MCV 89 02/18/2022    MCV 87 12/07/2020    MCH 29.4 02/18/2022    MCH 29.6 12/07/2020    MCHC 33.3 02/18/2022    MCHC 34.0 12/07/2020    RDW 13.6 02/18/2022    RDW 13.4 12/07/2020     02/18/2022     12/07/2020       BMP RESULTS:  Lab Results   Component Value Date     12/07/2020    POTASSIUM 4.1 12/07/2020    CHLORIDE 104 12/07/2020    CO2 25 12/07/2020    ANIONGAP 6 12/07/2020    GLC 89 12/07/2020    BUN 23 12/07/2020    CR 1.12 12/07/2020    GFRESTIMATED 72 12/07/2020    GFRESTBLACK 84 12/07/2020    MAR 9.1 12/07/2020        A1C RESULTS:  Lab Results   Component Value Date    A1C 5.9 (H) 05/18/2022    A1C 6.1 (H) 12/07/2020       Thank you for allowing me to participate in the care of your patient.      Sincerely,     Cecilia Cox MD     [facility]  cc:   Referred Self

## 2022-05-18 NOTE — PATIENT INSTRUCTIONS
Echocardiogram and nuclear stress test  1 month WARNER follow up  CT scan in 12 months for pulmonary nodule  Continue current medications, if elevated cholesterol then may increase simvastatin dose

## 2022-05-19 ENCOUNTER — TELEPHONE (OUTPATIENT)
Dept: CARDIOLOGY | Facility: CLINIC | Age: 57
End: 2022-05-19
Payer: COMMERCIAL

## 2022-05-19 ENCOUNTER — DOCUMENTATION ONLY (OUTPATIENT)
Dept: CARDIOLOGY | Facility: CLINIC | Age: 57
End: 2022-05-19
Payer: COMMERCIAL

## 2022-05-19 DIAGNOSIS — R93.1 ELEVATED CORONARY ARTERY CALCIUM SCORE: Primary | ICD-10-CM

## 2022-05-19 LAB
CHOLEST SERPL-MCNC: 192 MG/DL
FASTING STATUS PATIENT QL REPORTED: ABNORMAL
HDLC SERPL-MCNC: 40 MG/DL
LDLC SERPL CALC-MCNC: 126 MG/DL
NONHDLC SERPL-MCNC: 152 MG/DL
TRIGL SERPL-MCNC: 130 MG/DL

## 2022-05-19 NOTE — TELEPHONE ENCOUNTER
FLP results noted 5/18/22:    Component      Latest Ref Rng & Units 5/18/2022   Cholesterol      <200 mg/dL 192   Triglycerides      <150 mg/dL 130   HDL Cholesterol      >=40 mg/dL 40   LDL Cholesterol Calculated      <=100 mg/dL 126 (H)   Non HDL Cholesterol      <130 mg/dL 152 (H)   Patient Fasting > 8hrs?       Unknown     Labs done post OV. Dr. Cox's note mentions increasing simvastatin if LDL is elevated. Will route to Dr. Cox for review.

## 2022-05-19 NOTE — PROGRESS NOTES
RN received call from cardiopulmonary dept inquiring if patient needs exercise nuclear stress or lexiscan NM stress. RN informed tech that Dr. Cox would like patient to have exercise NM stress, but if unable to perform can complete as ordered as lexiscan NM stress test.

## 2022-05-20 ENCOUNTER — HOSPITAL ENCOUNTER (OUTPATIENT)
Dept: CARDIOLOGY | Facility: CLINIC | Age: 57
Discharge: HOME | End: 2022-05-20
Attending: INTERNAL MEDICINE
Payer: COMMERCIAL

## 2022-05-20 ENCOUNTER — HOSPITAL ENCOUNTER (OUTPATIENT)
Dept: NUCLEAR MEDICINE | Facility: CLINIC | Age: 57
Setting detail: NUCLEAR MEDICINE
Discharge: HOME | End: 2022-05-20
Attending: INTERNAL MEDICINE
Payer: COMMERCIAL

## 2022-05-20 DIAGNOSIS — Z82.49 FAMILY HISTORY OF ISCHEMIC HEART DISEASE: ICD-10-CM

## 2022-05-20 DIAGNOSIS — R93.1 ELEVATED CORONARY ARTERY CALCIUM SCORE: ICD-10-CM

## 2022-05-20 DIAGNOSIS — R07.89 ATYPICAL CHEST PAIN: ICD-10-CM

## 2022-05-20 LAB
CV STRESS MAX HR HE: 150
RATE PRESSURE PRODUCT: NORMAL
STRESS ECHO BASELINE DIASTOLIC HE: 84
STRESS ECHO BASELINE HR: 71 BPM
STRESS ECHO BASELINE SYSTOLIC BP: 120
STRESS ECHO CALCULATED PERCENT HR: 93 %
STRESS ECHO LAST STRESS DIASTOLIC BP: 70
STRESS ECHO LAST STRESS SYSTOLIC BP: 170
STRESS ECHO POST ESTIMATED WORKLOAD: 12 METS
STRESS ECHO POST EXERCISE DUR MIN: 10 MIN
STRESS ECHO POST EXERCISE DUR SEC: 0 SEC
STRESS ECHO TARGET HR: 161
STRESS/REST PERFUSION RATIO: 1.12

## 2022-05-20 PROCEDURE — 343N000001 HC RX 343: Performed by: INTERNAL MEDICINE

## 2022-05-20 PROCEDURE — 93018 CV STRESS TEST I&R ONLY: CPT | Performed by: INTERNAL MEDICINE

## 2022-05-20 PROCEDURE — 78452 HT MUSCLE IMAGE SPECT MULT: CPT

## 2022-05-20 PROCEDURE — A9502 TC99M TETROFOSMIN: HCPCS | Performed by: INTERNAL MEDICINE

## 2022-05-20 PROCEDURE — 93017 CV STRESS TEST TRACING ONLY: CPT

## 2022-05-20 PROCEDURE — 78452 HT MUSCLE IMAGE SPECT MULT: CPT | Mod: 26 | Performed by: INTERNAL MEDICINE

## 2022-05-20 PROCEDURE — 93016 CV STRESS TEST SUPVJ ONLY: CPT | Performed by: INTERNAL MEDICINE

## 2022-05-20 RX ADMIN — TETROFOSMIN 10.7 MCI.: 1.38 INJECTION, POWDER, LYOPHILIZED, FOR SOLUTION INTRAVENOUS at 13:06

## 2022-05-20 RX ADMIN — TETROFOSMIN 31 MCI.: 1.38 INJECTION, POWDER, LYOPHILIZED, FOR SOLUTION INTRAVENOUS at 14:40

## 2022-05-20 NOTE — TELEPHONE ENCOUNTER
Cecilia Cox MD  You 5 minutes ago (8:52 AM)     CF    Thanks Allegra. Let's try increasing to 40 mg and checking CPK, LFTs and lipids in 6 months. Thank you! If he gets symptoms from this then we can cut back down and keep his LDL goal < 130     Dr. Cox    Message text       Updated patient with Dr. Cox's recommendations via [employer]. Orders placed for labs in 6 months.

## 2022-05-23 ENCOUNTER — MYC MEDICAL ADVICE (OUTPATIENT)
Dept: CARDIOLOGY | Facility: CLINIC | Age: 57
End: 2022-05-23
Payer: COMMERCIAL

## 2022-05-23 DIAGNOSIS — E78.5 HYPERLIPIDEMIA LDL GOAL <130: ICD-10-CM

## 2022-05-23 RX ORDER — SIMVASTATIN 40 MG
40 TABLET ORAL AT BEDTIME
Qty: 90 TABLET | Refills: 3 | Status: SHIPPED | OUTPATIENT
Start: 2022-05-23 | End: 2022-11-07 | Stop reason: ALTCHOICE

## 2022-05-26 LAB — SCANNED LAB RESULT: NORMAL

## 2022-06-14 ENCOUNTER — VIRTUAL VISIT (OUTPATIENT)
Dept: ONCOLOGY | Facility: CLINIC | Age: 57
End: 2022-06-14
Attending: GENETIC COUNSELOR, MS
Payer: COMMERCIAL

## 2022-06-14 DIAGNOSIS — Z80.0 FAMILY HISTORY OF COLON CANCER: Primary | ICD-10-CM

## 2022-06-14 DIAGNOSIS — Z80.3 FAMILY HISTORY OF MALIGNANT NEOPLASM OF BREAST: ICD-10-CM

## 2022-06-14 PROCEDURE — 999N000069 HC STATISTIC GENETIC COUNSELING, < 16 MIN: Mod: GT,95 | Performed by: GENETIC COUNSELOR, MS

## 2022-06-14 NOTE — LETTER
Cancer Risk Management  Program Locations    [facility]  [facility]  [facility]  [facility]  Mailing Address  Cancer Risk Management Program  [facility]  [address]  [facility] [address]  [place] [address]    New patient appointments  [phone]  June 16, 2022    Willard Manzano  [address]  [facility] [address]      Dear Willard,    It was a pleasure speaking with you recently regarding your genetic testing results. Here is a copy of the progress note summarizing our conversation. If you have any additional questions, please feel free to call.    6/14/2022    Willard is a 57 year old who is being evaluated via a billable video visit.      How would you like to obtain your AVS? MyChart  If the video visit is dropped, the invitation should be resent by: Send to e-mail at: [email or web address]  Will anyone else be joining your video visit? Yes, wife will sit in on video visit    Joseline DICKEY    Video-Visit Details  Type of service:  Video Visit  Video Start Time: 3:27pm  Video End Time:3:41pm  Originating Location (pt. Location): Home  Distant Location (provider location):  [facility]   Platform used for Video Visit: Lisandra     Referring Provider: Cassandra Villafana PA-C    Presenting Information:  I spoke to Willard by video today to discuss his genetic testing results. We last met on 5/10/2022 and his blood was drawn on 5/18/2022. The [facility] Multi-Cancer Pane  was ordered from [facility]. This testing was done because of Willard's family history of colon and breast cancer.    Genetic Testing Result: NEGATIVE  Willard is negative for mutations in the AIP, ALK, APC, COREEN, AXIN2, BAP1, BARD1, BLM, BMPR1A, BRCA1, BRCA2, BRIP1, CASR, CDC73, CDH1, CDK4, CDKN1B, CDKN1C, CDKN2A, CEBPA, CHEK2, CTNNA1, DICER1, DIS3L2, EGFR, EPCAM,  "FH, FLCN, GATA2, GPC3, GREM1, HOXB13, HRAS, KIT, MAX, MEN1, MET, MITF, MLH1, MSH2, MSH3, MSH6, MUTYH, NBN, NF1, NF2, NTHL1, PALB2, PDGFRA, PHOX2B, PMS2, POLD1, POLE, POT1, GTQZS6E, PTCH1, PTEN, RAD50, RAD51C, RAD51D, RB1, RECQL4, RET, RUNX1, SDHA, SDHAF2, SDHB, SDHC, SDHD, SMAD4, SMARCA4, SMARCB1, SMARCE1, STK11, SUFU, TERC, TERT, [id number], TP53, TSC1, TSC2, VHL, WRN, and WT1 genes.      No mutations were found in any of the 84 genes analyzed. This test involved sequencing and deletion/duplication analysis of all genes with the exceptions of EPCAM and GREM1 (deletions/duplications only), MITF (only the status of the c.952G>A (p.E318K) alteration is analyzed and reported), and SDHA (sequencing only).      A copy of the test report can be found in the Laboratory tab, dated 5/18/2022, and named \"LABORATORY MISCELLANEOUS ORDER\". The report is scanned in as a linked document.    Interpretation:  We discussed several different interpretations of this negative test result.    1. One explanation may be that there is a different gene or combination of genes and environment that are associated with the cancers in this family that are not identifiable using this test. As such, Willard is encouraged to contact me regularly to review any new genetic testing options that may be appropriate for him.  2. Another explanation may be that his relatives with cancer, such as his brother with early-onset colon cancer, did have a mutation in one of these 84 genes and Willard did not inherit it.  3. There is also a small possibility that there is a mutation in one of these genes, and the testing laboratory could not find it with their current testing methods.       Screening:  Based on this negative test result, it is important for Willard and his relatives to refer back to the family history for appropriate cancer screening.    Per current National Comprehensive Cancer Network (NCCN) guidelines, individuals with a first degree relative with " colorectal cancer diagnosed at any age (like Willard's brother) should begin colonoscopy at age 40, or 10 years before the earliest diagnosis of colorectal cancer, whichever is first. In this family, colonoscopy should start at age 38 and be repeated every 5 years, or based on colonoscopy findings. This would apply to Willard and his other siblings. These recommendations may change based on personal and family history as well as personal preference, and should be discussed with an individual's physician.        Willard s close female relatives remain at increased risk for breast cancer given their family history. Breast cancer screening is generally recommended to begin approximately 10 years younger than the earliest age of breast cancer diagnosis in the family, or at age 40, whichever comes first. In this family, screening may begin at age 40. Breast screening options should be discussed with an individual's primary care provider and a genetic counselor, to determine at what age to begin screening, what screening is appropriate, and if additional screening (such as breast MRI) is necessary based on personal/family history factors.    Other population cancer screening options, such as those recommended by the American Cancer Society and NCCN, are also appropriate for Willard and his family. These screening recommendations may change if there are changes to Willard's personal and/or family history of cancer. Final screening recommendations should be made by each individual's primary care provider.      Inheritance:  We reviewed the autosomal dominant inheritance of mutations in these 84 genes. We discussed that Willard did not pass on an identifiable mutation in these genes to his children based on this test result. Mutations in these genes do not skip generations.      Additional Testing Considerations:  Although Willard's genetic testing result was negative, other relatives may still carry a gene mutation associated with hereditary  cancer. Genetic counseling is recommended for Willard's siblings to discuss their genetic testing options. If any of these relatives do pursue genetic testing, Willard is encouraged to contact me so that we may review the impact of their test results on him.    Summary:  We do not have an explanation for Willard's family history of cancer. While no genetic changes were identified, Willard may still be at risk for certain cancers due to family history, environmental factors, or other genetic causes not identified by this test. Because of that, it is important that he continue with cancer screening based on his personal and family history as discussed above.    Genetic testing is rapidly advancing, and new cancer susceptibility genes will most likely be identified in the future. Therefore, I encouraged Willard to contact me regularly or if there are changes in his personal or family history. This may change how we assess his cancer risk, screening, and the testing we would offer.    Plan:  1. At his request, I provided Willard with a copy of his test report through [employer]'s patient portal.  2. He plans to follow-up with his medical providers, as needed.  3. He should contact me periodically and if his personal or family history of cancer changes.    If Willard has any further questions, I encouraged him to contact me at [phone].    Zo Cruz MS, [facility]  Licensed, Certified Genetic Counselor  Office: [phone]  Pager: [phone]

## 2022-06-14 NOTE — LETTER
"    6/14/2022         RE: Willard Manzano  [address] [facility] [address]        Dear Colleague,    Thank you for referring your patient, Willard Manzano, to the [facility]. Please see a copy of my visit note below.    6/14/2022    Referring Provider: Cassandra Villafana PA-C    Presenting Information:  I spoke to Willard by video today to discuss his genetic testing results. We last met on 5/10/2022 and his blood was drawn on 5/18/2022. The [facility] Multi-Cancer Pane  was ordered from [facility]. This testing was done because of Willard's family history of colon and breast cancer.    Genetic Testing Result: NEGATIVE  Willard is negative for mutations in the AIP, ALK, APC, COREEN, AXIN2, BAP1, BARD1, BLM, BMPR1A, BRCA1, BRCA2, BRIP1, CASR, CDC73, CDH1, CDK4, CDKN1B, CDKN1C, CDKN2A, CEBPA, CHEK2, CTNNA1, DICER1, DIS3L2, EGFR, EPCAM, FH, FLCN, GATA2, GPC3, GREM1, HOXB13, HRAS, KIT, MAX, MEN1, MET, MITF, MLH1, MSH2, MSH3, MSH6, MUTYH, NBN, NF1, NF2, NTHL1, PALB2, PDGFRA, PHOX2B, PMS2, POLD1, POLE, POT1, AQXWB8P, PTCH1, PTEN, RAD50, RAD51C, RAD51D, RB1, RECQL4, RET, RUNX1, SDHA, SDHAF2, SDHB, SDHC, SDHD, SMAD4, SMARCA4, SMARCB1, SMARCE1, STK11, SUFU, TERC, TERT, [id number], TP53, TSC1, TSC2, VHL, WRN, and WT1 genes.      No mutations were found in any of the 84 genes analyzed. This test involved sequencing and deletion/duplication analysis of all genes with the exceptions of EPCAM and GREM1 (deletions/duplications only), MITF (only the status of the c.952G>A (p.E318K) alteration is analyzed and reported), and SDHA (sequencing only).      A copy of the test report can be found in the Laboratory tab, dated 5/18/2022, and named \"LABORATORY MISCELLANEOUS ORDER\". The report is scanned in as a linked document.    Interpretation:  We discussed several different interpretations of this negative test result.    1. One explanation may be that there is a different gene or combination of genes and " environment that are associated with the cancers in this family that are not identifiable using this test. As such, Willard is encouraged to contact me regularly to review any new genetic testing options that may be appropriate for him.  2. Another explanation may be that his relatives with cancer, such as his brother with early-onset colon cancer, did have a mutation in one of these 84 genes and Willard did not inherit it.  3. There is also a small possibility that there is a mutation in one of these genes, and the testing laboratory could not find it with their current testing methods.       Screening:  Based on this negative test result, it is important for Willard and his relatives to refer back to the family history for appropriate cancer screening.    Per current National Comprehensive Cancer Network (NCCN) guidelines, individuals with a first degree relative with colorectal cancer diagnosed at any age (like Willard's brother) should begin colonoscopy at age 40, or 10 years before the earliest diagnosis of colorectal cancer, whichever is first. In this family, colonoscopy should start at age 38 and be repeated every 5 years, or based on colonoscopy findings. This would apply to Willard and his other siblings. These recommendations may change based on personal and family history as well as personal preference, and should be discussed with an individual's physician.        Willard s close female relatives remain at increased risk for breast cancer given their family history. Breast cancer screening is generally recommended to begin approximately 10 years younger than the earliest age of breast cancer diagnosis in the family, or at age 40, whichever comes first. In this family, screening may begin at age 40. Breast screening options should be discussed with an individual's primary care provider and a genetic counselor, to determine at what age to begin screening, what screening is appropriate, and if additional screening (such as  breast MRI) is necessary based on personal/family history factors.    Other population cancer screening options, such as those recommended by the American Cancer Society and NCCN, are also appropriate for Willard and his family. These screening recommendations may change if there are changes to Willard's personal and/or family history of cancer. Final screening recommendations should be made by each individual's primary care provider.      Inheritance:  We reviewed the autosomal dominant inheritance of mutations in these 84 genes. We discussed that Willard did not pass on an identifiable mutation in these genes to his children based on this test result. Mutations in these genes do not skip generations.      Additional Testing Considerations:  Although Willard's genetic testing result was negative, other relatives may still carry a gene mutation associated with hereditary cancer. Genetic counseling is recommended for Willard's siblings to discuss their genetic testing options. If any of these relatives do pursue genetic testing, Willard is encouraged to contact me so that we may review the impact of their test results on him.    Summary:  We do not have an explanation for Willard's family history of cancer. While no genetic changes were identified, Willard may still be at risk for certain cancers due to family history, environmental factors, or other genetic causes not identified by this test. Because of that, it is important that he continue with cancer screening based on his personal and family history as discussed above.    Genetic testing is rapidly advancing, and new cancer susceptibility genes will most likely be identified in the future. Therefore, I encouraged Willard to contact me regularly or if there are changes in his personal or family history. This may change how we assess his cancer risk, screening, and the testing we would offer.    Plan:  1. At his request, I provided Willard with a copy of his test report through [employer]  patient portal.  2. He plans to follow-up with his medical providers, as needed.  3. He should contact me periodically and if his personal or family history of cancer changes.    If Willard has any further questions, I encouraged him to contact me at [phone].      Zo Cruz MS, [facility]  Licensed, Certified Genetic Counselor  Office: [phone]  Pager: [phone]

## 2022-06-14 NOTE — PROGRESS NOTES
6/14/2022    Willard is a 57 year old who is being evaluated via a billable video visit.      How would you like to obtain your AVS? MyChart  If the video visit is dropped, the invitation should be resent by: Send to e-mail at: [email or web address]  Will anyone else be joining your video visit? Yes, wife will sit in on video visit    Joseline DICKEY    Video-Visit Details  Type of service:  Video Visit  Video Start Time: 3:27pm  Video End Time:3:41pm  Originating Location (pt. Location): Home  Distant Location (provider location):  [facility]   Platform used for Video Visit: [employer]     Referring Provider: Cassandra Villafana PA-C    Presenting Information:  I spoke to Willard by video today to discuss his genetic testing results. We last met on 5/10/2022 and his blood was drawn on 5/18/2022. The [facility] Multi-Cancer Pane  was ordered from [facility]. This testing was done because of Willard's family history of colon and breast cancer.    Genetic Testing Result: NEGATIVE  Willard is negative for mutations in the AIP, ALK, APC, COREEN, AXIN2, BAP1, BARD1, BLM, BMPR1A, BRCA1, BRCA2, BRIP1, CASR, CDC73, CDH1, CDK4, CDKN1B, CDKN1C, CDKN2A, CEBPA, CHEK2, CTNNA1, DICER1, DIS3L2, EGFR, EPCAM, FH, FLCN, GATA2, GPC3, GREM1, HOXB13, HRAS, KIT, MAX, MEN1, MET, MITF, MLH1, MSH2, MSH3, MSH6, MUTYH, NBN, NF1, NF2, NTHL1, PALB2, PDGFRA, PHOX2B, PMS2, POLD1, POLE, POT1, ZBFOJ4M, PTCH1, PTEN, RAD50, RAD51C, RAD51D, RB1, RECQL4, RET, RUNX1, SDHA, SDHAF2, SDHB, SDHC, SDHD, SMAD4, SMARCA4, SMARCB1, SMARCE1, STK11, SUFU, TERC, TERT, [id number], TP53, TSC1, TSC2, VHL, WRN, and WT1 genes.      No mutations were found in any of the 84 genes analyzed. This test involved sequencing and deletion/duplication analysis of all genes with the exceptions of EPCAM and GREM1 (deletions/duplications only), MITF (only the status of the c.952G>A (p.E318K) alteration is analyzed and reported), and SDHA (sequencing only).   "    A copy of the test report can be found in the Laboratory tab, dated 5/18/2022, and named \"LABORATORY MISCELLANEOUS ORDER\". The report is scanned in as a linked document.    Interpretation:  We discussed several different interpretations of this negative test result.    1. One explanation may be that there is a different gene or combination of genes and environment that are associated with the cancers in this family that are not identifiable using this test. As such, Willard is encouraged to contact me regularly to review any new genetic testing options that may be appropriate for him.  2. Another explanation may be that his relatives with cancer, such as his brother with early-onset colon cancer, did have a mutation in one of these 84 genes and Willard did not inherit it.  3. There is also a small possibility that there is a mutation in one of these genes, and the testing laboratory could not find it with their current testing methods.       Screening:  Based on this negative test result, it is important for Willard and his relatives to refer back to the family history for appropriate cancer screening.    Per current National Comprehensive Cancer Network (NCCN) guidelines, individuals with a first degree relative with colorectal cancer diagnosed at any age (like Willard's brother) should begin colonoscopy at age 40, or 10 years before the earliest diagnosis of colorectal cancer, whichever is first. In this family, colonoscopy should start at age 38 and be repeated every 5 years, or based on colonoscopy findings. This would apply to Willard and his other siblings. These recommendations may change based on personal and family history as well as personal preference, and should be discussed with an individual's physician.        Willard s close female relatives remain at increased risk for breast cancer given their family history. Breast cancer screening is generally recommended to begin approximately 10 years younger than the " earliest age of breast cancer diagnosis in the family, or at age 40, whichever comes first. In this family, screening may begin at age 40. Breast screening options should be discussed with an individual's primary care provider and a genetic counselor, to determine at what age to begin screening, what screening is appropriate, and if additional screening (such as breast MRI) is necessary based on personal/family history factors.    Other population cancer screening options, such as those recommended by the American Cancer Society and NCCN, are also appropriate for Willard and his family. These screening recommendations may change if there are changes to Willard's personal and/or family history of cancer. Final screening recommendations should be made by each individual's primary care provider.      Inheritance:  We reviewed the autosomal dominant inheritance of mutations in these 84 genes. We discussed that Willard did not pass on an identifiable mutation in these genes to his children based on this test result. Mutations in these genes do not skip generations.      Additional Testing Considerations:  Although Willard's genetic testing result was negative, other relatives may still carry a gene mutation associated with hereditary cancer. Genetic counseling is recommended for Willard's siblings to discuss their genetic testing options. If any of these relatives do pursue genetic testing, Willard is encouraged to contact me so that we may review the impact of their test results on him.    Summary:  We do not have an explanation for Willard's family history of cancer. While no genetic changes were identified, Willard may still be at risk for certain cancers due to family history, environmental factors, or other genetic causes not identified by this test. Because of that, it is important that he continue with cancer screening based on his personal and family history as discussed above.    Genetic testing is rapidly advancing, and new cancer  susceptibility genes will most likely be identified in the future. Therefore, I encouraged Willard to contact me regularly or if there are changes in his personal or family history. This may change how we assess his cancer risk, screening, and the testing we would offer.    Plan:  1. At his request, I provided Willard with a copy of his test report through [employer]'s patient portal.  2. He plans to follow-up with his medical providers, as needed.  3. He should contact me periodically and if his personal or family history of cancer changes.    If Willard has any further questions, I encouraged him to contact me at [phone].    Zo Cruz MS, [facility]  Licensed, Certified Genetic Counselor  Office: [phone]  Pager: [phone]

## 2022-06-16 NOTE — PATIENT INSTRUCTIONS
Negative Genetic Test Result    Genetic Testing  You had a blood test that looked at the genetic information in one or more genes associated with increased cancer risk.  The testing looked for any harmful changes that would stop this particular gene from working like it should. If an individual does not have any harmful changes or variants of unknown significance found from their blood test, their genetic test result is reported as negative.       Results  The genetic test did not identify any pathogenic (harmful) changes in the genes that were tested. There are several possible explanations for a negative test result. Without knowing the gene mutation in your family, the cause of the cancer in you or your relatives is still unknown. Your genetic counselor can help interpret the result for you and your relatives. In this case, there are several reasons that may explain the negative test result:  There may be a gene mutation in the family that you did not inherit.   You may have a gene mutation in a different gene that was not included in the test, or has not yet been discovered.   The cancers in you or your family may be due to a combination of genetic factors and environment (multifactorial/familial).  The cancers in you or your family may be sporadic/random cancers.  There is very small chance that a mutation was not found by current testing methods.  As testing technology evolves over time, it may still be possible to identify a mutation in a gene that was not found on this test.    It is important to note which genes were included in your test. A list of these genes can be found on your test result.    Screening Recommendations  Due to this negative test result, cancer screening recommendations should be based on your personal and family history. This may include increased cancer screening for you and/or your family members. Your genetic counselor and health care provider can help make appropriate  recommendations.      Please call us if you have any questions or concerns.   Cancer Risk Management Program [phone] ([phone])  Yovanny Ortega, MS [facility]  [phone]  Alaina Patton, MS, [facility] [phone]  Ginette Drake, MS, [facility]  [phone]  Brittany Cordero, MS, [facility]  [phone]  Juani Leon, MS, [facility]  [phone]  Zo Cruz, MS, [facility] [phone]  Jacy Miranda, MS, [facility] [phone]

## 2022-06-21 ENCOUNTER — OFFICE VISIT (OUTPATIENT)
Dept: CARDIOLOGY | Facility: CLINIC | Age: 57
End: 2022-06-21
Attending: INTERNAL MEDICINE
Payer: COMMERCIAL

## 2022-06-21 VITALS
BODY MASS INDEX: 29.79 KG/M2 | HEIGHT: 70 IN | DIASTOLIC BLOOD PRESSURE: 78 MMHG | WEIGHT: 208.1 LBS | SYSTOLIC BLOOD PRESSURE: 124 MMHG | HEART RATE: 81 BPM

## 2022-06-21 DIAGNOSIS — E78.5 DYSLIPIDEMIA: ICD-10-CM

## 2022-06-21 DIAGNOSIS — Z82.49 FAMILY HISTORY OF ISCHEMIC HEART DISEASE: ICD-10-CM

## 2022-06-21 DIAGNOSIS — R91.1 PULMONARY NODULE: ICD-10-CM

## 2022-06-21 DIAGNOSIS — R93.1 ELEVATED CORONARY ARTERY CALCIUM SCORE: ICD-10-CM

## 2022-06-21 DIAGNOSIS — R07.89 ATYPICAL CHEST PAIN: Primary | ICD-10-CM

## 2022-06-21 DIAGNOSIS — Z77.22 SECONDHAND SMOKE EXPOSURE: ICD-10-CM

## 2022-06-21 PROCEDURE — 99214 OFFICE O/P EST MOD 30 MIN: CPT | Performed by: NURSE PRACTITIONER

## 2022-06-21 NOTE — PATIENT INSTRUCTIONS
Thank you for your visit with the [facility] today.    Today's plan:   Continue with your current medications.  Check the echocardiogram as planned sometime in the couple of months. We can update you on these results by phone.  Follow-up for a routine annual visit with Dr. Cox in around May 2023.  If the echocardiogram looks good and you continue to have symptoms, follow-up with your primary care doctor for further evaluation of possible noncardiac causes for your symptoms.    Results: Your stress test results look good with no evidence of ischemia or lack of blood flow to any territories of your heart muscle.    If you have questions or concerns, please do not hesitate to call my nurse team at [phone].     Scheduling phone number: [phone]    It was a pleasure seeing you today!     ANDREW García, CNP  Nurse Practitioner  [facility]  June 21, 2022  ________________________________________________________

## 2022-06-21 NOTE — LETTER
6/21/2022    Cassandra Villafana PA-C  [address] [facility] [id number]    RE: Willard Manzano       Dear Colleague,     I had the pleasure of seeing Willard Manzano in the [facility].    Cardiology Clinic Progress Note    Service Date: June 21, 2022    Primary Cardiologist: Dr. Cox      Reason for Visit: Follow up of stress test results    HPI:   I had the pleasure of meeting Mr. Willard Manzano in the clinic today. He is a very pleasant 57 year old male with a past medical history notable for hyperlipidemia and mildly elevated CT coronary calcium score. He recently met with Dr. Cecilia Cox in cardiology consultation last month for evaluation of shortness of breath with exertion and occasional nagging left-sided chest pressure, both at rest and with stress with no radiation.     He has a family history of coronary artery disease in one of his brothers who suffered a heart attack in his 50s and 60s.  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    He underwent CT coronary calcium scoring on 5/5/2022 showing a total Agatston calcium score of 22.1, concentrated in the left anterior descending artery.  Incidentally noted were some small pulmonary nodules that were < 6 mm.    For further evaluation of his chest pressure and reassessment of the small pulmonary nodules given significant secondhand smoke exposure, Dr. Cox recommended an exercise nuclear stress test, echocardiogram, and repeat CT chest in about 12 months.    The exercise nuclear stress test was completed on 5/20/2022 showing average exercise capacity, 3/10 baseline chest pain was noted at the time of the test that did not change with exercise. No ischemic EKG changes were noted. The nuclear stress test was negative for inducible myocardial ischemia or infarction. The left ventricular ejection fraction at stress is  greater than 70%. No changes when directly compared to nuclear stress from 2015.    The echocardiogram has not been completed yet.     Today, Mr. Manzano presents to the clinic accompanied by his wife in follow up of his recent test results.  He tells me that he has been feeling generally well since his visit in May.  He has continued to stay active by walking his dog most days.  He otherwise does not do much in terms of formal exercise. He does have a treadmill at home, which he notes that he is hoping to use more regularly going forward to try to get in better shape. He continues to have mild chest pressure and exertional dyspnea at times, which he feels has been unchanged over the past few weeks. He otherwise denies symptoms of palpitations, dizziness, presyncope, syncope, orthopnea, PND, or lower extremity edema. He denies any known history of asthma or COPD.  We reviewed the reassuring stress test findings in detail today.    ASSESSMENT:  1. Exertional dyspnea and chest pain with some typical and atypical features  2. Family history of CAD in his brother  3. Mildly elevated coronary artery calcium score  4. Dyslipidemia, treated on simvastatin 40 mg once daily  5. Small pulmonary nodules with history of significant secondhand smoke exposure    PLAN:  - Recommend completing the echocardiogram as previously planned to rule out any significant valvular disease or LV dysfunction contributing to his symptoms. We can update him on these results by phone or MyChart message.  - If the echocardiogram is unremarkable, would suggest following up with his PCP for further evaluation of non-cardiac causes for his symptoms. Differentials include asthma or deconditioning.  GERD is also a possibility, but seems less likely as there does seem to be an exertional component to symptoms at times.  - Continue with aspirin, statin, heart healthy Mediterranean style diet, and trying to get more regular exercise.  - Repeat fasting lipid  profile, ALT, and CK as previously suggested. If LDL remains above 100, would consider switching to high intensity statin as tolerated to try to push LDL closer to 70 or lower given his family history and elevated calcium score.  - Repeat CT chest in 1 year for reevaluation of the small pulmonary nodules.  - Follow-up with Dr. Cox in 1 year or I would be happy to see him back sooner if needed based on the echocardiogram results.    Thank you for the opportunity to participate in this pleasant patient's care. I encouraged the patient or his wife to call the clinic with any questions or concerns in the meantime, and as always we would be happy to see him sooner if needed.     35 total minutes was spent today including chart review, precharting, history and exam, post visit documentation, and reviewing studies as outlined above.     ANDREW García, CNP   Nurse Practitioner  [facility]  Pager: [phone]  Text Page  (8am - 5pm, M-F)    Orders this Visit:  No orders of the defined types were placed in this encounter.    No orders of the defined types were placed in this encounter.    There are no discontinued medications.  Encounter Diagnoses   Name Primary?     Atypical chest pain Yes     Family history of ischemic heart disease      Elevated coronary artery calcium score      Dyslipidemia      Pulmonary nodule      Secondhand smoke exposure      CURRENT MEDICATIONS:  Current Outpatient Medications   Medication Sig Dispense Refill     aspirin 81 MG EC tablet Take 81 mg by mouth daily       fenofibrate (TRICOR) 145 MG tablet TAKE 1 TABLET(145 MG) BY MOUTH DAILY 90 tablet 2     loratadine (CLARITIN) 10 MG tablet Take 10 mg by mouth daily       metFORMIN (GLUCOPHAGE-XR) 500 MG 24 hr tablet TAKE 1 TABLET(500 MG) BY MOUTH DAILY WITH DINNER 90 tablet 0     Omega-3 Fatty Acids (FISH OIL PO)        omeprazole (PRILOSEC) 40 MG DR capsule TAKE 1 CAPSULE(40 MG) BY MOUTH DAILY 30 TO 60 MINUTES BEFORE A  MEAL 90 capsule 3     simvastatin (ZOCOR) 40 MG tablet Take 1 tablet (40 mg) by mouth At Bedtime 90 tablet 3     meloxicam (MOBIC) 15 MG tablet TAKE 1 TABLET(15 MG) BY MOUTH DAILY (Patient not taking: Reported on 6/21/2022) 90 tablet 0     ALLERGIES  Allergies   Allergen Reactions     Mold Shortness Of Breath     Cats      Congestion and drainage     Grass Cough     No Known Allergies        PAST MEDICAL, SURGICAL, FAMILY HISTORY:  History was reviewed and updated as needed, see medical record.    SOCIAL HISTORY:  Social History     Socioeconomic History     Marital status:      Spouse name: Not on file     Number of children: Not on file     Years of education: Not on file     Highest education level: Not on file   Occupational History     Not on file   Tobacco Use     Smoking status: Never Smoker     Smokeless tobacco: Never Used     Tobacco comment: Some sporadic use 40+ years ago- cigars   Vaping Use     Vaping Use: Never used   Substance and Sexual Activity     Alcohol use: Yes     Comment: 1-2 drinks a week     Drug use: No     Sexual activity: Yes     Partners: Female     Birth control/protection: Male Surgical     Comment: I'm fixed   Other Topics Concern     Parent/sibling w/ CABG, MI or angioplasty before 65F 55M? Yes     Comment: Brother   Social History Narrative     Not on file     Social Determinants of Health     Financial Resource Strain: Not on file   Food Insecurity: Not on file   Transportation Needs: Not on file   Physical Activity: Not on file   Stress: Not on file   Social Connections: Not on file   Intimate Partner Violence: Not on file   Housing Stability: Not on file       Review of Systems:  Skin:  Negative     Eyes:  Positive for glasses  ENT:  Negative    Respiratory:  Positive for shortness of breath;dyspnea on exertion  Cardiovascular:  Negative    Gastroenterology: Negative    Genitourinary:  Negative    Musculoskeletal:  Positive for joint pain  Neurologic:  Negative   "  Psychiatric:  Negative    Heme/Lymph/Imm:  Positive for allergies  Endocrine:  Positive for diabetes     Physical Exam:  Vitals: /78 (BP Location: Right arm, Patient Position: Sitting)   Pulse 81   Ht 1.778 m (5' 10\")   Wt 94.4 kg (208 lb 1.6 oz)   BMI 29.86 kg/m     Wt Readings from Last 4 Encounters:   06/21/22 94.4 kg (208 lb 1.6 oz)   05/18/22 95.5 kg (210 lb 8 oz)   04/08/22 95.7 kg (211 lb)   02/18/22 95.3 kg (210 lb)     CONSTITUTIONAL: Appears his stated age, well nourished, and in no acute distress.  HEENT: Pupils equal, round. Sclerae nonicteric.    NECK: Supple, no masses or JVD appreciated.   C/V:  Regular rate and rhythm, normal S1 and S2, no S3 or S4, no murmur, rub or gallop.  RESP: Respirations are unlabored. Lungs are clear to auscultation bilaterally without any appreciable wheezes or crackles.  EXTREM: No clubbing, cyanosis, or lower extremity edema bilaterally.   NEURO: Alert and oriented, cooperative. Gait steady. No gross focal deficits.   PSYCH: Affect appropriate. Mentation normal. Responds to questions appropriately.  SKIN: Warm and dry. No apparent rashes or bruising.     Recent Lab Results:  LIPID RESULTS:  Lab Results   Component Value Date    CHOL 192 05/18/2022    CHOL 216 (H) 12/07/2020    HDL 40 05/18/2022    HDL 42 12/07/2020     (H) 05/18/2022     (H) 12/07/2020    TRIG 130 05/18/2022    TRIG 92 12/07/2020    CHOLHDLRATIO 4.7 08/20/2014     LIVER ENZYME RESULTS:  Lab Results   Component Value Date    AST 25 12/07/2020    ALT 40 12/07/2020     CBC RESULTS:  Lab Results   Component Value Date    WBC 5.2 02/18/2022    WBC 5.2 12/07/2020    RBC 5.64 02/18/2022    RBC 5.34 12/07/2020    HGB 16.6 02/18/2022    HGB 15.8 12/07/2020    HCT 49.9 02/18/2022    HCT 46.5 12/07/2020    MCV 89 02/18/2022    MCV 87 12/07/2020    MCH 29.4 02/18/2022    MCH 29.6 12/07/2020    MCHC 33.3 02/18/2022    MCHC 34.0 12/07/2020    RDW 13.6 02/18/2022    RDW 13.4 12/07/2020    PLT " 337 02/18/2022     12/07/2020     BMP RESULTS:  Lab Results   Component Value Date     12/07/2020    POTASSIUM 4.1 12/07/2020    CHLORIDE 104 12/07/2020    CO2 25 12/07/2020    ANIONGAP 6 12/07/2020    GLC 89 12/07/2020    BUN 23 12/07/2020    CR 1.12 12/07/2020    GFRESTIMATED 72 12/07/2020    GFRESTBLACK 84 12/07/2020    MAR 9.1 12/07/2020      A1C RESULTS:  Lab Results   Component Value Date    A1C 5.9 (H) 05/18/2022    A1C 6.1 (H) 12/07/2020     CC  Cecilia Cox MD  [address] [place], [place] [id number]    This note was completed in part using Dragon voice recognition software. Although reviewed after completion, some word and grammatical errors may occur.    Thank you for allowing me to participate in the care of your patient.      Sincerely,     Juan Alberto Cameron NP     [facility]

## 2022-06-21 NOTE — PROGRESS NOTES
Cardiology Clinic Progress Note    Service Date: June 21, 2022    Primary Cardiologist: Dr. Cox      Reason for Visit: Follow up of stress test results    HPI:   I had the pleasure of meeting Mr. Willard Manzano in the clinic today. He is a very pleasant 57 year old male with a past medical history notable for hyperlipidemia and mildly elevated CT coronary calcium score. He recently met with Dr. Cecilia Cox in cardiology consultation last month for evaluation of shortness of breath with exertion and occasional nagging left-sided chest pressure, both at rest and with stress with no radiation.     He has a family history of coronary artery disease in one of his brothers who suffered a heart attack in his 50s and 60s.  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    He underwent CT coronary calcium scoring on 5/5/2022 showing a total Agatston calcium score of 22.1, concentrated in the left anterior descending artery.  Incidentally noted were some small pulmonary nodules that were < 6 mm.    For further evaluation of his chest pressure and reassessment of the small pulmonary nodules given significant secondhand smoke exposure, Dr. Cox recommended an exercise nuclear stress test, echocardiogram, and repeat CT chest in about 12 months.    The exercise nuclear stress test was completed on 5/20/2022 showing average exercise capacity, 3/10 baseline chest pain was noted at the time of the test that did not change with exercise. No ischemic EKG changes were noted. The nuclear stress test was negative for inducible myocardial ischemia or infarction. The left ventricular ejection fraction at stress is greater than 70%. No changes when directly compared to nuclear stress from 2015.    The echocardiogram has not been completed yet.     Today, Mr. Manzano presents to the clinic accompanied by his wife in follow  up of his recent test results.  He tells me that he has been feeling generally well since his visit in May.  He has continued to stay active by walking his dog most days.  He otherwise does not do much in terms of formal exercise. He does have a treadmill at home, which he notes that he is hoping to use more regularly going forward to try to get in better shape. He continues to have mild chest pressure and exertional dyspnea at times, which he feels has been unchanged over the past few weeks. He otherwise denies symptoms of palpitations, dizziness, presyncope, syncope, orthopnea, PND, or lower extremity edema. He denies any known history of asthma or COPD.  We reviewed the reassuring stress test findings in detail today.    ASSESSMENT:  1. Exertional dyspnea and chest pain with some typical and atypical features  2. Family history of CAD in his brother  3. Mildly elevated coronary artery calcium score  4. Dyslipidemia, treated on simvastatin 40 mg once daily  5. Small pulmonary nodules with history of significant secondhand smoke exposure    PLAN:  - Recommend completing the echocardiogram as previously planned to rule out any significant valvular disease or LV dysfunction contributing to his symptoms. We can update him on these results by phone or [employer] message.  - If the echocardiogram is unremarkable, would suggest following up with his PCP for further evaluation of non-cardiac causes for his symptoms. Differentials include asthma or deconditioning.  GERD is also a possibility, but seems less likely as there does seem to be an exertional component to symptoms at times.  - Continue with aspirin, statin, heart healthy Mediterranean style diet, and trying to get more regular exercise.  - Repeat fasting lipid profile, ALT, and CK as previously suggested. If LDL remains above 100, would consider switching to high intensity statin as tolerated to try to push LDL closer to 70 or lower given his family history and  elevated calcium score.  - Repeat CT chest in 1 year for reevaluation of the small pulmonary nodules.  - Follow-up with Dr. Cox in 1 year or I would be happy to see him back sooner if needed based on the echocardiogram results.    Thank you for the opportunity to participate in this pleasant patient's care. I encouraged the patient or his wife to call the clinic with any questions or concerns in the meantime, and as always we would be happy to see him sooner if needed.     35 total minutes was spent today including chart review, precharting, history and exam, post visit documentation, and reviewing studies as outlined above.     ANDREW García, CNP   Nurse Practitioner  [facility]  Pager: [phone]  Text Page  (8am - 5pm, M-F)    Orders this Visit:  No orders of the defined types were placed in this encounter.    No orders of the defined types were placed in this encounter.    There are no discontinued medications.  Encounter Diagnoses   Name Primary?     Atypical chest pain Yes     Family history of ischemic heart disease      Elevated coronary artery calcium score      Dyslipidemia      Pulmonary nodule      Secondhand smoke exposure      CURRENT MEDICATIONS:  Current Outpatient Medications   Medication Sig Dispense Refill     aspirin 81 MG EC tablet Take 81 mg by mouth daily       fenofibrate (TRICOR) 145 MG tablet TAKE 1 TABLET(145 MG) BY MOUTH DAILY 90 tablet 2     loratadine (CLARITIN) 10 MG tablet Take 10 mg by mouth daily       metFORMIN (GLUCOPHAGE-XR) 500 MG 24 hr tablet TAKE 1 TABLET(500 MG) BY MOUTH DAILY WITH DINNER 90 tablet 0     Omega-3 Fatty Acids (FISH OIL PO)        omeprazole (PRILOSEC) 40 MG DR capsule TAKE 1 CAPSULE(40 MG) BY MOUTH DAILY 30 TO 60 MINUTES BEFORE A MEAL 90 capsule 3     simvastatin (ZOCOR) 40 MG tablet Take 1 tablet (40 mg) by mouth At Bedtime 90 tablet 3     meloxicam (MOBIC) 15 MG tablet TAKE 1 TABLET(15 MG) BY MOUTH DAILY (Patient not taking:  Reported on 6/21/2022) 90 tablet 0     ALLERGIES  Allergies   Allergen Reactions     Mold Shortness Of Breath     Cats      Congestion and drainage     Grass Cough     No Known Allergies        PAST MEDICAL, SURGICAL, FAMILY HISTORY:  History was reviewed and updated as needed, see medical record.    SOCIAL HISTORY:  Social History     Socioeconomic History     Marital status:      Spouse name: Not on file     Number of children: Not on file     Years of education: Not on file     Highest education level: Not on file   Occupational History     Not on file   Tobacco Use     Smoking status: Never Smoker     Smokeless tobacco: Never Used     Tobacco comment: Some sporadic use 40+ years ago- cigars   Vaping Use     Vaping Use: Never used   Substance and Sexual Activity     Alcohol use: Yes     Comment: 1-2 drinks a week     Drug use: No     Sexual activity: Yes     Partners: Female     Birth control/protection: Male Surgical     Comment: I'm fixed   Other Topics Concern     Parent/sibling w/ CABG, MI or angioplasty before 65F 55M? Yes     Comment: Brother   Social History Narrative     Not on file     Social Determinants of Health     Financial Resource Strain: Not on file   Food Insecurity: Not on file   Transportation Needs: Not on file   Physical Activity: Not on file   Stress: Not on file   Social Connections: Not on file   Intimate Partner Violence: Not on file   Housing Stability: Not on file       Review of Systems:  Skin:  Negative     Eyes:  Positive for glasses  ENT:  Negative    Respiratory:  Positive for shortness of breath;dyspnea on exertion  Cardiovascular:  Negative    Gastroenterology: Negative    Genitourinary:  Negative    Musculoskeletal:  Positive for joint pain  Neurologic:  Negative    Psychiatric:  Negative    Heme/Lymph/Imm:  Positive for allergies  Endocrine:  Positive for diabetes     Physical Exam:  Vitals: /78 (BP Location: Right arm, Patient Position: Sitting)   Pulse 81   Ht  "1.778 m (5' 10\")   Wt 94.4 kg (208 lb 1.6 oz)   BMI 29.86 kg/m     Wt Readings from Last 4 Encounters:   06/21/22 94.4 kg (208 lb 1.6 oz)   05/18/22 95.5 kg (210 lb 8 oz)   04/08/22 95.7 kg (211 lb)   02/18/22 95.3 kg (210 lb)     CONSTITUTIONAL: Appears his stated age, well nourished, and in no acute distress.  HEENT: Pupils equal, round. Sclerae nonicteric.    NECK: Supple, no masses or JVD appreciated.   C/V:  Regular rate and rhythm, normal S1 and S2, no S3 or S4, no murmur, rub or gallop.  RESP: Respirations are unlabored. Lungs are clear to auscultation bilaterally without any appreciable wheezes or crackles.  EXTREM: No clubbing, cyanosis, or lower extremity edema bilaterally.   NEURO: Alert and oriented, cooperative. Gait steady. No gross focal deficits.   PSYCH: Affect appropriate. Mentation normal. Responds to questions appropriately.  SKIN: Warm and dry. No apparent rashes or bruising.     Recent Lab Results:  LIPID RESULTS:  Lab Results   Component Value Date    CHOL 192 05/18/2022    CHOL 216 (H) 12/07/2020    HDL 40 05/18/2022    HDL 42 12/07/2020     (H) 05/18/2022     (H) 12/07/2020    TRIG 130 05/18/2022    TRIG 92 12/07/2020    CHOLHDLRATIO 4.7 08/20/2014     LIVER ENZYME RESULTS:  Lab Results   Component Value Date    AST 25 12/07/2020    ALT 40 12/07/2020     CBC RESULTS:  Lab Results   Component Value Date    WBC 5.2 02/18/2022    WBC 5.2 12/07/2020    RBC 5.64 02/18/2022    RBC 5.34 12/07/2020    HGB 16.6 02/18/2022    HGB 15.8 12/07/2020    HCT 49.9 02/18/2022    HCT 46.5 12/07/2020    MCV 89 02/18/2022    MCV 87 12/07/2020    MCH 29.4 02/18/2022    MCH 29.6 12/07/2020    MCHC 33.3 02/18/2022    MCHC 34.0 12/07/2020    RDW 13.6 02/18/2022    RDW 13.4 12/07/2020     02/18/2022     12/07/2020     BMP RESULTS:  Lab Results   Component Value Date     12/07/2020    POTASSIUM 4.1 12/07/2020    CHLORIDE 104 12/07/2020    CO2 25 12/07/2020    ANIONGAP 6 12/07/2020 "    GLC 89 12/07/2020    BUN 23 12/07/2020    CR 1.12 12/07/2020    GFRESTIMATED 72 12/07/2020    GFRESTBLACK 84 12/07/2020    MAR 9.1 12/07/2020      A1C RESULTS:  Lab Results   Component Value Date    A1C 5.9 (H) 05/18/2022    A1C 6.1 (H) 12/07/2020     CC  Cecilia Cox MD  [address] [place] [id number]    This note was completed in part using Dragon voice recognition software. Although reviewed after completion, some word and grammatical errors may occur.

## 2022-07-06 ENCOUNTER — TELEPHONE (OUTPATIENT)
Dept: CARDIOLOGY | Facility: CLINIC | Age: 57
End: 2022-07-06

## 2022-07-06 NOTE — TELEPHONE ENCOUNTER
Chart reviewed. It appears that Ismael ANDERSON and Dr. Cox recommend patient get the echo. CT chest will be due May 2023. Pt updated. Message sent to scheduling to help get patient set up for echo.

## 2022-07-06 NOTE — TELEPHONE ENCOUNTER
[facility] Call Center    Phone Message    May a detailed message be left on voicemail: yes     Reason for Call: Other: Pt called in wanting to know if there is any testing Dr. Cox would like pt to do. There are orders for a CT and an echo but the request date is for next year. Please review and c/b to discuss     Action Taken: Message routed to:  Other: cardio    Travel Screening: Not Applicable

## 2022-07-19 DIAGNOSIS — M25.562 PAIN IN BOTH KNEES, UNSPECIFIED CHRONICITY: ICD-10-CM

## 2022-07-19 DIAGNOSIS — M25.561 PAIN IN BOTH KNEES, UNSPECIFIED CHRONICITY: ICD-10-CM

## 2022-07-21 RX ORDER — MELOXICAM 15 MG/1
TABLET ORAL
Qty: 90 TABLET | Refills: 0 | Status: SHIPPED | OUTPATIENT
Start: 2022-07-21 | End: 2022-11-01

## 2022-07-21 NOTE — TELEPHONE ENCOUNTER
Routing refill request to provider for review/approval because:  Labs out of range:  AST, ALT  Labs not current:  ALT, AST     NSAID Medications Failed 07/19/2022 06:30 AM   Protocol Details  Normal ALT on file in past 12 months    Normal AST on file in past 12 months    Normal serum creatinine on file in past 12 months

## 2022-07-27 ENCOUNTER — HOSPITAL ENCOUNTER (OUTPATIENT)
Dept: CARDIOLOGY | Facility: CLINIC | Age: 57
Discharge: HOME | End: 2022-07-27
Attending: INTERNAL MEDICINE | Admitting: INTERNAL MEDICINE
Payer: COMMERCIAL

## 2022-07-27 DIAGNOSIS — R07.89 ATYPICAL CHEST PAIN: ICD-10-CM

## 2022-07-27 DIAGNOSIS — R93.1 ELEVATED CORONARY ARTERY CALCIUM SCORE: ICD-10-CM

## 2022-07-27 DIAGNOSIS — Z82.49 FAMILY HISTORY OF ISCHEMIC HEART DISEASE: ICD-10-CM

## 2022-07-27 LAB — LVEF ECHO: NORMAL

## 2022-07-27 PROCEDURE — 93306 TTE W/DOPPLER COMPLETE: CPT | Mod: 26 | Performed by: INTERNAL MEDICINE

## 2022-07-27 PROCEDURE — 93306 TTE W/DOPPLER COMPLETE: CPT

## 2022-08-22 ENCOUNTER — TRANSFERRED RECORDS (OUTPATIENT)
Dept: HEALTH INFORMATION MANAGEMENT | Facility: CLINIC | Age: 57
End: 2022-08-22

## 2022-09-05 DIAGNOSIS — E78.5 HYPERLIPIDEMIA LDL GOAL <130: ICD-10-CM

## 2022-09-07 RX ORDER — FENOFIBRATE 145 MG/1
TABLET, COATED ORAL
Qty: 90 TABLET | Refills: 0 | Status: SHIPPED | OUTPATIENT
Start: 2022-09-07 | End: 2022-12-08

## 2022-09-07 NOTE — TELEPHONE ENCOUNTER
Prescription approved per [facility] Refill Protocol    Signed Prescriptions:                        Disp   Refills    fenofibrate (TRICOR) 145 MG tablet         90 tab*0        Sig: TAKE 1 TABLET(145 MG) BY MOUTH DAILY  Authorizing Provider: RASHEL WALLER  Ordering User: FRANCES STEPHENS, Registered Nurse  [facility]

## 2022-10-16 ENCOUNTER — HEALTH MAINTENANCE LETTER (OUTPATIENT)
Age: 57
End: 2022-10-16

## 2022-10-29 DIAGNOSIS — M25.562 PAIN IN BOTH KNEES, UNSPECIFIED CHRONICITY: ICD-10-CM

## 2022-10-29 DIAGNOSIS — M25.561 PAIN IN BOTH KNEES, UNSPECIFIED CHRONICITY: ICD-10-CM

## 2022-10-30 DIAGNOSIS — R73.01 ELEVATED FASTING GLUCOSE: ICD-10-CM

## 2022-10-31 NOTE — TELEPHONE ENCOUNTER
Routing refill request to provider for review/approval because:  Labs not current:  GFR, CR, A1C  A break in medication    Creatinine   Date Value Ref Range Status   12/07/2020 1.12 0.66 - 1.25 mg/dL Final      GFR Estimate   Date Value Ref Range Status   12/07/2020 72 >60 mL/min/[1.73_m2] Final     Comment:     Non  GFR Calc  Starting 12/18/2018, serum creatinine based estimated GFR (eGFR) will be   calculated using the Chronic Kidney Disease Epidemiology Collaboration   (CKD-EPI) equation.       GFR Estimate If Black   Date Value Ref Range Status   12/07/2020 84 >60 mL/min/[1.73_m2] Final     Comment:      GFR Calc  Starting 12/18/2018, serum creatinine based estimated GFR (eGFR) will be   calculated using the Chronic Kidney Disease Epidemiology Collaboration   (CKD-EPI) equation.        Lab Results   Component Value Date    A1C 5.9 05/18/2022    A1C 6.4 02/18/2022    A1C 6.1 12/07/2020    A1C 6.1 11/13/2019    A1C 6.3 12/26/2018    A1C 6.1 06/14/2018    A1C 6.0 11/24/2017     Porter Contreras RN on 10/31/2022 at 3:44 PM

## 2022-10-31 NOTE — TELEPHONE ENCOUNTER
Routing refill request to provider for review/approval because:  Labs not current:  ALT, AST, Creat    Has lab visit 11/6    AST   Date Value Ref Range Status   12/07/2020 25 0 - 45 U/L Final     Lab Results   Component Value Date    ALT 40 12/07/2020     Creatinine   Date Value Ref Range Status   12/07/2020 1.12 0.66 - 1.25 mg/dL Final     Porter Contreras RN on 10/31/2022 at 3:07 PM

## 2022-11-01 RX ORDER — METFORMIN HCL 500 MG
TABLET, EXTENDED RELEASE 24 HR ORAL
Qty: 90 TABLET | Refills: 0 | Status: SHIPPED | OUTPATIENT
Start: 2022-11-01 | End: 2022-12-08

## 2022-11-01 RX ORDER — MELOXICAM 15 MG/1
TABLET ORAL
Qty: 90 TABLET | Refills: 0 | Status: SHIPPED | OUTPATIENT
Start: 2022-11-01 | End: 2022-12-08

## 2022-11-03 ENCOUNTER — OFFICE VISIT (OUTPATIENT)
Dept: URGENT CARE | Facility: URGENT CARE | Age: 57
End: 2022-11-03
Payer: COMMERCIAL

## 2022-11-03 VITALS
DIASTOLIC BLOOD PRESSURE: 84 MMHG | RESPIRATION RATE: 20 BRPM | OXYGEN SATURATION: 98 % | TEMPERATURE: 98 F | HEART RATE: 78 BPM | SYSTOLIC BLOOD PRESSURE: 145 MMHG

## 2022-11-03 DIAGNOSIS — R05.1 ACUTE COUGH: ICD-10-CM

## 2022-11-03 DIAGNOSIS — R30.0 DYSURIA: Primary | ICD-10-CM

## 2022-11-03 DIAGNOSIS — N39.0 URINARY TRACT INFECTION WITHOUT HEMATURIA, SITE UNSPECIFIED: ICD-10-CM

## 2022-11-03 DIAGNOSIS — J01.90 ACUTE SINUSITIS WITH COEXISTING CONDITION REQUIRING PROPHYLACTIC TREATMENT: ICD-10-CM

## 2022-11-03 LAB
ALBUMIN UR-MCNC: NEGATIVE MG/DL
AMORPH CRY #/AREA URNS HPF: ABNORMAL /HPF
APPEARANCE UR: CLEAR
BACTERIA #/AREA URNS HPF: ABNORMAL /HPF
BILIRUB UR QL STRIP: NEGATIVE
COLOR UR AUTO: YELLOW
FLUAV AG SPEC QL IA: NEGATIVE
FLUBV AG SPEC QL IA: NEGATIVE
GLUCOSE UR STRIP-MCNC: NEGATIVE MG/DL
HGB UR QL STRIP: NEGATIVE
KETONES UR STRIP-MCNC: NEGATIVE MG/DL
LEUKOCYTE ESTERASE UR QL STRIP: ABNORMAL
NITRATE UR QL: POSITIVE
PH UR STRIP: 7.5 [PH] (ref 5–7)
RBC #/AREA URNS AUTO: ABNORMAL /HPF
SP GR UR STRIP: 1.01 (ref 1–1.03)
SQUAMOUS #/AREA URNS AUTO: ABNORMAL /LPF
UROBILINOGEN UR STRIP-ACNC: 1 E.U./DL
WBC #/AREA URNS AUTO: ABNORMAL /HPF

## 2022-11-03 PROCEDURE — 81001 URINALYSIS AUTO W/SCOPE: CPT | Performed by: FAMILY MEDICINE

## 2022-11-03 PROCEDURE — 99214 OFFICE O/P EST MOD 30 MIN: CPT | Mod: CS | Performed by: FAMILY MEDICINE

## 2022-11-03 PROCEDURE — U0003 INFECTIOUS AGENT DETECTION BY NUCLEIC ACID (DNA OR RNA); SEVERE ACUTE RESPIRATORY SYNDROME CORONAVIRUS 2 (SARS-COV-2) (CORONAVIRUS DISEASE [COVID-19]), AMPLIFIED PROBE TECHNIQUE, MAKING USE OF HIGH THROUGHPUT TECHNOLOGIES AS DESCRIBED BY CMS-2020-01-R: HCPCS | Performed by: FAMILY MEDICINE

## 2022-11-03 PROCEDURE — 87804 INFLUENZA ASSAY W/OPTIC: CPT | Mod: 59 | Performed by: FAMILY MEDICINE

## 2022-11-03 PROCEDURE — 87086 URINE CULTURE/COLONY COUNT: CPT | Performed by: FAMILY MEDICINE

## 2022-11-03 PROCEDURE — U0005 INFEC AGEN DETEC AMPLI PROBE: HCPCS | Performed by: FAMILY MEDICINE

## 2022-11-03 PROCEDURE — 87186 SC STD MICRODIL/AGAR DIL: CPT | Performed by: FAMILY MEDICINE

## 2022-11-03 RX ORDER — CODEINE PHOSPHATE AND GUAIFENESIN 10; 100 MG/5ML; MG/5ML
2 SOLUTION ORAL EVERY 6 HOURS PRN
Qty: 118 ML | Refills: 0 | Status: SHIPPED | OUTPATIENT
Start: 2022-11-03 | End: 2022-12-08

## 2022-11-03 NOTE — PROGRESS NOTES
SUBJECTIVE:   Willard Manzano is a 57 year old male presenting with a chief complaint of runny nose, sinus congestion, headache.    Onset of symptoms was 3 day(s) ago.  Course of illness is worsening.    Severity moderate  Current and Associated symptoms: cough, poor sleep, sinus congestion  Treatment measures tried include Tylenol/Ibuprofen, Fluids and Rest.  Predisposing factors include None.    Completed COVID J&J vaccination, boosted   Has not done home COVID test yet    Woke up with urinary symptoms and concern for UTI, had in the past.  Endorsed odor and discharge.  Denies any concerns for STD, in monogamous relationship    Past Medical History:   Diagnosis Date     Arthritis 1/2011     Blood clotting disorder (H)      Cancer (H)     Skin     Chiari malformation type I (H)      Elevated fasting glucose 1/19/2012     Headaches, migraine      Headaches, migraine      Other and unspecified hyperlipidemia      Pedal edema 6/10/2021     Rotator cuff tear 1/22/2012     Rotator cuff tear 1/22/2012     Rotator cuff tendinitis 1/2011    Inj. Dr. Cheney.      Spinal headache 2007    spontaneous spinal fluid leak - 2 blood patches     Urethral polyp 6-24-10    Dr. Card     Urethral polyp 6/24/2010    Dr. Card      Varicose veins of both lower extremities 6/10/2021     Current Outpatient Medications   Medication Sig Dispense Refill     amoxicillin-clavulanate (AUGMENTIN) 875-125 MG tablet Take 1 tablet by mouth 2 times daily for 10 days 20 tablet 0     aspirin 81 MG EC tablet Take 81 mg by mouth daily       fenofibrate (TRICOR) 145 MG tablet TAKE 1 TABLET(145 MG) BY MOUTH DAILY 90 tablet 0     guaiFENesin-codeine (ROBITUSSIN AC) 100-10 MG/5ML solution Take 10 mLs by mouth every 6 hours as needed for cough 118 mL 0     loratadine (CLARITIN) 10 MG tablet Take 10 mg by mouth daily       meloxicam (MOBIC) 15 MG tablet TAKE 1 TABLET(15 MG) BY MOUTH DAILY 90 tablet 0     metFORMIN (GLUCOPHAGE XR) 500 MG 24 hr tablet TAKE 1  TABLET(500 MG) BY MOUTH DAILY WITH DINNER 90 tablet 0     Omega-3 Fatty Acids (FISH OIL PO)        omeprazole (PRILOSEC) 40 MG DR capsule TAKE 1 CAPSULE(40 MG) BY MOUTH DAILY 30 TO 60 MINUTES BEFORE A MEAL 90 capsule 3     simvastatin (ZOCOR) 40 MG tablet Take 1 tablet (40 mg) by mouth At Bedtime 90 tablet 3     Social History     Tobacco Use     Smoking status: Never     Smokeless tobacco: Never     Tobacco comments:     Some sporadic use 40+ years ago- cigars   Substance Use Topics     Alcohol use: Yes     Comment: 1-2 drinks a week       ROS:  Review of systems negative except as stated above.    OBJECTIVE:  BP (!) 145/84   Pulse 78   Temp 98  F (36.7  C) (Tympanic)   Resp 20   SpO2 98%   GENERAL APPEARANCE: healthy, alert and no distress  EYES: EOMI,  PERRL, conjunctiva clear  RESP: lungs clear to auscultation - no rales, rhonchi or wheezes  CV: regular rates and rhythm, normal S1 S2, no murmur noted  PSYCH: mentation appears normal and affect normal/bright    Results for orders placed or performed in visit on 11/03/22   UA Macro with Reflex to Micro and Culture - lab collect     Status: Abnormal    Specimen: Urine, Clean Catch   Result Value Ref Range    Color Urine Yellow Colorless, Straw, Light Yellow, Yellow    Appearance Urine Clear Clear    Glucose Urine Negative Negative, 1000 , >=2000 mg/dL    Bilirubin Urine Negative Negative    Ketones Urine Negative Negative, 160  mg/dL    Specific Gravity Urine 1.015 1.003 - 1.035    Blood Urine Negative Negative    pH Urine 7.5 (H) 5.0 - 7.0    Protein Albumin Urine Negative Negative, 300 , >=2000 mg/dL    Urobilinogen Urine 1.0 0.2, 1.0 E.U./dL    Nitrite Urine Positive (A) Negative    Leukocyte Esterase Urine Small (A) Negative   Urine Microscopic Exam     Status: Abnormal   Result Value Ref Range    Bacteria Urine Many (A) None Seen /HPF    RBC Urine 0-2 0-2 /HPF /HPF    WBC Urine 5-10 (A) 0-5 /HPF /HPF    Squamous Epithelials Urine Few (A) None Seen /LPF     Amorphous Crystals Urine Moderate (A) None Seen /HPF   Influenza A & B Antigen - Clinic Collect     Status: Normal    Specimen: Nose; Swab   Result Value Ref Range    Influenza A antigen Negative Negative    Influenza B antigen Negative Negative    Narrative    Test results must be correlated with clinical data. If necessary, results should be confirmed by a molecular assay or viral culture.       ASSESSMENT/PLAN:  (R30.0) Dysuria  (primary encounter diagnosis)  Comment: UTI  Plan: UA Macro with Reflex to Micro and Culture - lab        collect, Urine Microscopic Exam, Urine Culture            (R05.1) Acute cough  Plan: guaiFENesin-codeine (ROBITUSSIN AC) 100-10         MG/5ML solution, Influenza A & B Antigen -         Clinic Collect, Symptomatic; Unknown COVID-19         Virus (Coronavirus) by PCR Nose            (J01.90) Acute sinusitis with coexisting condition requiring prophylactic treatment  Plan: amoxicillin-clavulanate (AUGMENTIN) 875-125 MG         tablet            (N39.0) Urinary tract infection without hematuria, site unspecified  Plan: amoxicillin-clavulanate (AUGMENTIN) 875-125 MG         tablet            Reassurance given, reviewed symptomatic treatment with tylenol, ibuprofen, plenty of fluids and rest.  RX Augmentin given for treatment for both UTI and sinus infection.  Will follow up on urine culture and adjust medication if needed.  RX jazzmine AC given to help with cough.  COVID screen obtained as symptoms overlap with COVID infection, quarantine while awaiting result.    Follow up with primary provider if no improvement of symptoms in 1 week    James Cifuentes MD  November 3, 2022 5:36 PM

## 2022-11-04 LAB — SARS-COV-2 RNA RESP QL NAA+PROBE: NEGATIVE

## 2022-11-05 LAB — BACTERIA UR CULT: ABNORMAL

## 2022-11-06 ENCOUNTER — LAB (OUTPATIENT)
Dept: LAB | Facility: CLINIC | Age: 57
End: 2022-11-06
Payer: COMMERCIAL

## 2022-11-06 DIAGNOSIS — R93.1 ELEVATED CORONARY ARTERY CALCIUM SCORE: ICD-10-CM

## 2022-11-06 LAB
ALBUMIN SERPL-MCNC: 3.4 G/DL (ref 3.4–5)
ALP SERPL-CCNC: 34 U/L (ref 40–150)
ALT SERPL W P-5'-P-CCNC: 35 U/L (ref 0–70)
AST SERPL W P-5'-P-CCNC: 22 U/L (ref 0–45)
BILIRUB DIRECT SERPL-MCNC: 0.2 MG/DL (ref 0–0.2)
BILIRUB SERPL-MCNC: 0.7 MG/DL (ref 0.2–1.3)
CHOLEST SERPL-MCNC: 196 MG/DL
CK SERPL-CCNC: 95 U/L (ref 30–300)
FASTING STATUS PATIENT QL REPORTED: ABNORMAL
HDLC SERPL-MCNC: 24 MG/DL
LDLC SERPL CALC-MCNC: 123 MG/DL
NONHDLC SERPL-MCNC: 172 MG/DL
PROT SERPL-MCNC: 7.6 G/DL (ref 6.8–8.8)
TRIGL SERPL-MCNC: 244 MG/DL

## 2022-11-06 PROCEDURE — 80061 LIPID PANEL: CPT

## 2022-11-06 PROCEDURE — 82550 ASSAY OF CK (CPK): CPT

## 2022-11-06 PROCEDURE — 80076 HEPATIC FUNCTION PANEL: CPT

## 2022-11-06 PROCEDURE — 36415 COLL VENOUS BLD VENIPUNCTURE: CPT

## 2022-11-07 ENCOUNTER — OFFICE VISIT (OUTPATIENT)
Dept: CARDIOLOGY | Facility: CLINIC | Age: 57
End: 2022-11-07
Payer: COMMERCIAL

## 2022-11-07 VITALS
OXYGEN SATURATION: 96 % | HEART RATE: 86 BPM | WEIGHT: 204.7 LBS | HEIGHT: 70 IN | BODY MASS INDEX: 29.31 KG/M2 | DIASTOLIC BLOOD PRESSURE: 84 MMHG | SYSTOLIC BLOOD PRESSURE: 130 MMHG

## 2022-11-07 DIAGNOSIS — E78.5 HYPERLIPIDEMIA LDL GOAL <70: ICD-10-CM

## 2022-11-07 DIAGNOSIS — R93.1 ELEVATED CORONARY ARTERY CALCIUM SCORE: Primary | ICD-10-CM

## 2022-11-07 PROCEDURE — 99214 OFFICE O/P EST MOD 30 MIN: CPT | Performed by: NURSE PRACTITIONER

## 2022-11-07 RX ORDER — ROSUVASTATIN CALCIUM 20 MG/1
20 TABLET, COATED ORAL DAILY
Qty: 30 TABLET | Refills: 11 | Status: SHIPPED | OUTPATIENT
Start: 2022-11-07 | End: 2023-02-06

## 2022-11-07 NOTE — LETTER
11/7/2022    Cassandra Villafana PA-C  [id number] [facility] [id number]    RE: Willard Manzano       Dear Colleague,     I had the pleasure of seeing Willard Manzano in the [facility].       ~Cardiology Clinic Visit~    Primary Cardiologist: Dr. Cox  Last Office Visit: 06/21/2022  Reason for visit: Follow-up for echo results    I had the pleasure of meeting Willard Manzano in Cardiology clinic today.    History of Present Illness    He is a very pleasant 57 year old male with a past medical history notable for hyperlipidemia and mildly elevated CT coronary calcium score. He recently met with Dr. Cecilia Cox in cardiology consultation last month for evaluation of shortness of breath with exertion and occasional nagging left-sided chest pressure, both at rest and with stress with no radiation.      He has a family history of coronary artery disease in one of his brothers who suffered a heart attack in his 50s and 60s.  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     He underwent CT coronary calcium scoring on 5/5/2022 showing a total Agatston calcium score of 22.1, concentrated in the left anterior descending artery.  Incidentally noted were some small pulmonary nodules that were < 6 mm.     For further evaluation of his chest pressure and reassessment of the small pulmonary nodules given significant secondhand smoke exposure, Dr. Cox recommended an exercise nuclear stress test, echocardiogram, and repeat CT chest in about 12 months.     The exercise nuclear stress test was completed on 5/20/2022 showing average exercise capacity, 3/10 baseline chest pain was noted at the time of the test that did not change with exercise. No ischemic EKG changes were noted. The nuclear stress test was negative for inducible myocardial ischemia or infarction. The left ventricular ejection fraction  at stress is greater than 70%. No changes when directly compared to nuclear stress from 2015.    Interval  I am seeing this pleasant patient today for follow-up, and unfortunately he is not feeling at his best as he is sick with a sinus infection and UTI.  From a cardiac perspective though he has no new symptoms to report.  He otherwise denies symptoms of palpitations, dizziness, presyncope, syncope, orthopnea, PND, or lower extremity edema. He denies any known history of asthma or COPD.  He continues to remain active by walking his dogs 3-4 times a week, has no shortness of breath associated with activity.    Impression & Plan  Mildly elevated coronary artery calcium score  Exertional dyspnea and chest pain with typical and atypical features  Family history of CAD in his brother - soon to have double bypass surgery.  -No anginal symptoms.  -Recent echocardiogram demonstrated normal EF function 55-60%, no acute abnormalities.  -Is compliant with his medications, although notes that he has not taken most of his medications since this past Thursday when he started taking antibiotics because he was unsure of the interactions.  -Currently on aspirin, simvastatin.    Plan: Stop simvastatin.  Start rosuvastatin 20 mg daily for more aggressive cholesterol management.    Repeat labs in 1 month, follow-up in cardiology clinic at that time.    Hyperlipidemia, LDL goal <70  - Most recent lipid panel 11/6/22: , HDL 24, , .  - Currently managed on simvastatin. Continue to optimize lipid levels to goals with healthy lifestyle choices.    Plan: Change simvastatin to rosuvastatin as outlined above.    Small pulmonary nodules with history of significant secondhand smoke exposure  -Remote monitoring.  -Follow with yearly CT for trends.    Thank you for the opportunity to participate in this pleasant patient's care.  Patient will see WARNER for follow-up with labs in 1 month, then will plan to see Dr. Cox in follow  up in summer 2023, this would be approximately 1-year from previous MD visit.   We would be happy to see this patient sooner if needed for any concerns in the meantime.    ANDREW Dee, CNP   Nurse Practitioner  [facility]    Today's clinic visit entailed:  Review of the result(s) of each unique test - labs, echo, CT, calcium score  Ordering of each unique test  Prescription drug management    The level of medical decision making during this visit was of moderate complexity.      Orders this Visit:  Orders Placed This Encounter   Procedures     Lipid Profile     Follow-Up with Cardiology WARNER     Orders Placed This Encounter   Medications     rosuvastatin (CRESTOR) 20 MG tablet     Sig: Take 1 tablet (20 mg) by mouth daily     Dispense:  30 tablet     Refill:  11     Medications Discontinued During This Encounter   Medication Reason     simvastatin (ZOCOR) 40 MG tablet Alternate therapy       Encounter Diagnoses   Name Primary?     Hyperlipidemia LDL goal <70      Elevated coronary artery calcium score Yes       CURRENT MEDICATIONS:  Current Outpatient Medications   Medication Sig Dispense Refill     amoxicillin-clavulanate (AUGMENTIN) 875-125 MG tablet Take 1 tablet by mouth 2 times daily for 10 days 20 tablet 0     aspirin 81 MG EC tablet Take 81 mg by mouth daily       fenofibrate (TRICOR) 145 MG tablet TAKE 1 TABLET(145 MG) BY MOUTH DAILY 90 tablet 0     loratadine (CLARITIN) 10 MG tablet Take 10 mg by mouth daily       meloxicam (MOBIC) 15 MG tablet TAKE 1 TABLET(15 MG) BY MOUTH DAILY 90 tablet 0     metFORMIN (GLUCOPHAGE XR) 500 MG 24 hr tablet TAKE 1 TABLET(500 MG) BY MOUTH DAILY WITH DINNER 90 tablet 0     Omega-3 Fatty Acids (FISH OIL PO)        omeprazole (PRILOSEC) 40 MG DR capsule TAKE 1 CAPSULE(40 MG) BY MOUTH DAILY 30 TO 60 MINUTES BEFORE A MEAL 90 capsule 3     rosuvastatin (CRESTOR) 20 MG tablet Take 1 tablet (20 mg) by mouth daily 30 tablet 11     guaiFENesin-codeine  "(ROBITUSSIN AC) 100-10 MG/5ML solution Take 10 mLs by mouth every 6 hours as needed for cough (Patient not taking: Reported on 11/7/2022) 118 mL 0     ALLERGIES     Allergies   Allergen Reactions     Mold Shortness Of Breath     Cats      Congestion and drainage     Grass Cough     No Known Allergies      PAST MEDICAL, SURGICAL, FAMILY, SOCIAL HISTORY:  History was reviewed and updated as needed, see medical record.  Review of Systems:  Review Of Systems  Skin: negative  Eyes: negative  Ears/Nose/Throat: Positive for sinus congestion/infection  Respiratory: Shortness of breath- intermittent from acute illness. No GUERRA.  Cardiovascular: negative for, palpitations, tachycardia, irregular heart beat, chest pain, exertional chest pain or pressure, paroxysmal nocturnal dyspnea, lower extremity edema, syncope or near-syncope and exercise intolerance  Gastrointestinal: negative  Genitourinary: positive for urinary tract infection  Musculoskeletal: positive for joint pain and knee pain/discomfort.  Neurologic: negative  Psychiatric: negative  Hematologic/Lymphatic/Immunologic: negative  Endocrine: negative     Physical Exam:    Vitals: /84 (BP Location: Right arm, Patient Position: Right side)   Pulse 86   Ht 1.778 m (5' 10\")   Wt 92.9 kg (204 lb 11.2 oz)   SpO2 96%   BMI 29.37 kg/m    Constitutional: Appears stated age, well nourished, NAD.  Eyes: Pupils equal, round. Sclerae anicteric.   HEENT: Normocephalic, atraumatic.   Neck: Supple. Carotid pulses full and equal. No carotid bruit.  No JVD appreciated.  Respiratory: Non-labored. Lungs CTAB. No crackles, wheezes, rhonchi, or rales.  Cardiovascular: RRR, normal S1 and S2. No M/G/R.  GI: Soft, non-distended, non-tender, bowel sounds present equally.  Skin: Warm and dry. No rashes, cyanosis, edema, or xanthelasma.  Musculoskeletal/Extremities: Symmetrical movement to all extremities. No edema.  Neurologic: No gross focal deficits. Alert, awake, and oriented to " all spheres.  Psychiatric: Affect appropriate. Mentation normal.    Recent Lab Results:  LIPID RESULTS:  Lab Results   Component Value Date    CHOL 196 11/06/2022    CHOL 216 (H) 12/07/2020    HDL 24 (L) 11/06/2022    HDL 42 12/07/2020     (H) 11/06/2022     (H) 12/07/2020    TRIG 244 (H) 11/06/2022    TRIG 92 12/07/2020    CHOLHDLRATIO 4.7 08/20/2014     LIVER ENZYME RESULTS:  Lab Results   Component Value Date    AST 22 11/06/2022    AST 25 12/07/2020    ALT 35 11/06/2022    ALT 40 12/07/2020     CBC RESULTS:  Lab Results   Component Value Date    WBC 5.2 02/18/2022    WBC 5.2 12/07/2020    RBC 5.64 02/18/2022    RBC 5.34 12/07/2020    HGB 16.6 02/18/2022    HGB 15.8 12/07/2020    HCT 49.9 02/18/2022    HCT 46.5 12/07/2020    MCV 89 02/18/2022    MCV 87 12/07/2020    MCH 29.4 02/18/2022    MCH 29.6 12/07/2020    MCHC 33.3 02/18/2022    MCHC 34.0 12/07/2020    RDW 13.6 02/18/2022    RDW 13.4 12/07/2020     02/18/2022     12/07/2020     BMP RESULTS:  Lab Results   Component Value Date     12/07/2020    POTASSIUM 4.1 12/07/2020    CHLORIDE 104 12/07/2020    CO2 25 12/07/2020    ANIONGAP 6 12/07/2020    GLC 89 12/07/2020    BUN 23 12/07/2020    CR 1.12 12/07/2020    GFRESTIMATED 72 12/07/2020    GFRESTBLACK 84 12/07/2020    MAR 9.1 12/07/2020      A1C RESULTS:  Lab Results   Component Value Date    A1C 5.9 (H) 05/18/2022    A1C 6.1 (H) 12/07/2020     INR RESULTS:  Lab Results   Component Value Date    INR 1.05 06/14/2018    INR 1.03 06/07/2007       Thank you for allowing me to participate in the care of your patient.      Sincerely,     Katalina Riggs APRN CNP     [facility]  cc:   Referred Self,

## 2022-11-07 NOTE — PATIENT INSTRUCTIONS
Thank you for your visit with the [facility] today.    Today's plan:   Stop Zocor.  Start Rosuvastatin (Crestor).  Plan to repeat labs and follow up in clinic in about 1-month to recheck your cholesterol numbers.    Results: Your echocardiogram looks normal.    If you have questions or concerns, please do not hesitate to call my nursing support team at [phone].    Scheduling phone number: [phone]  [facility] Clinic Number: [phone]    It was a pleasure seeing you today.     ANDREW Dee, CNP  Nurse Practitioner  [facility]  November 7, 2022  ________________________________________________________

## 2022-11-07 NOTE — PROGRESS NOTES
~Cardiology Clinic Visit~    Primary Cardiologist: Dr. Cox  Last Office Visit: 06/21/2022  Reason for visit: Follow-up for echo results    I had the pleasure of meeting Willard Manzano in Cardiology clinic today.    History of Present Illness    He is a very pleasant 57 year old male with a past medical history notable for hyperlipidemia and mildly elevated CT coronary calcium score. He recently met with Dr. Cecilia Cox in cardiology consultation last month for evaluation of shortness of breath with exertion and occasional nagging left-sided chest pressure, both at rest and with stress with no radiation.      He has a family history of coronary artery disease in one of his brothers who suffered a heart attack in his 50s and 60s.  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     He underwent CT coronary calcium scoring on 5/5/2022 showing a total Agatston calcium score of 22.1, concentrated in the left anterior descending artery.  Incidentally noted were some small pulmonary nodules that were < 6 mm.     For further evaluation of his chest pressure and reassessment of the small pulmonary nodules given significant secondhand smoke exposure, Dr. Cox recommended an exercise nuclear stress test, echocardiogram, and repeat CT chest in about 12 months.     The exercise nuclear stress test was completed on 5/20/2022 showing average exercise capacity, 3/10 baseline chest pain was noted at the time of the test that did not change with exercise. No ischemic EKG changes were noted. The nuclear stress test was negative for inducible myocardial ischemia or infarction. The left ventricular ejection fraction at stress is greater than 70%. No changes when directly compared to nuclear stress from 2015.    Interval  I am seeing this pleasant patient today for follow-up, and unfortunately he is not feeling at  his best as he is sick with a sinus infection and UTI.  From a cardiac perspective though he has no new symptoms to report.  He otherwise denies symptoms of palpitations, dizziness, presyncope, syncope, orthopnea, PND, or lower extremity edema. He denies any known history of asthma or COPD.  He continues to remain active by walking his dogs 3-4 times a week, has no shortness of breath associated with activity.    Impression & Plan  Mildly elevated coronary artery calcium score  Exertional dyspnea and chest pain with typical and atypical features  Family history of CAD in his brother - soon to have double bypass surgery.  -No anginal symptoms.  -Recent echocardiogram demonstrated normal EF function 55-60%, no acute abnormalities.  -Is compliant with his medications, although notes that he has not taken most of his medications since this past Thursday when he started taking antibiotics because he was unsure of the interactions.  -Currently on aspirin, simvastatin.    Plan: Stop simvastatin.  Start rosuvastatin 20 mg daily for more aggressive cholesterol management.    Repeat labs in 1 month, follow-up in cardiology clinic at that time.    Hyperlipidemia, LDL goal <70  - Most recent lipid panel 11/6/22: , HDL 24, , .  - Currently managed on simvastatin. Continue to optimize lipid levels to goals with healthy lifestyle choices.    Plan: Change simvastatin to rosuvastatin as outlined above.    Small pulmonary nodules with history of significant secondhand smoke exposure  -Remote monitoring.  -Follow with yearly CT for trends.    Thank you for the opportunity to participate in this pleasant patient's care.  Patient will see WARNER for follow-up with labs in 1 month, then will plan to see Dr. Cox in follow up in summer 2023, this would be approximately 1-year from previous MD visit.   We would be happy to see this patient sooner if needed for any concerns in the meantime.    Katalina Riggs, APRN, CNP    Nurse Practitioner  [facility]    Today's clinic visit entailed:  Review of the result(s) of each unique test - labs, echo, CT, calcium score  Ordering of each unique test  Prescription drug management    The level of medical decision making during this visit was of moderate complexity.      Orders this Visit:  Orders Placed This Encounter   Procedures     Lipid Profile     Follow-Up with Cardiology WARNER     Orders Placed This Encounter   Medications     rosuvastatin (CRESTOR) 20 MG tablet     Sig: Take 1 tablet (20 mg) by mouth daily     Dispense:  30 tablet     Refill:  11     Medications Discontinued During This Encounter   Medication Reason     simvastatin (ZOCOR) 40 MG tablet Alternate therapy       Encounter Diagnoses   Name Primary?     Hyperlipidemia LDL goal <70      Elevated coronary artery calcium score Yes       CURRENT MEDICATIONS:  Current Outpatient Medications   Medication Sig Dispense Refill     amoxicillin-clavulanate (AUGMENTIN) 875-125 MG tablet Take 1 tablet by mouth 2 times daily for 10 days 20 tablet 0     aspirin 81 MG EC tablet Take 81 mg by mouth daily       fenofibrate (TRICOR) 145 MG tablet TAKE 1 TABLET(145 MG) BY MOUTH DAILY 90 tablet 0     loratadine (CLARITIN) 10 MG tablet Take 10 mg by mouth daily       meloxicam (MOBIC) 15 MG tablet TAKE 1 TABLET(15 MG) BY MOUTH DAILY 90 tablet 0     metFORMIN (GLUCOPHAGE XR) 500 MG 24 hr tablet TAKE 1 TABLET(500 MG) BY MOUTH DAILY WITH DINNER 90 tablet 0     Omega-3 Fatty Acids (FISH OIL PO)        omeprazole (PRILOSEC) 40 MG DR capsule TAKE 1 CAPSULE(40 MG) BY MOUTH DAILY 30 TO 60 MINUTES BEFORE A MEAL 90 capsule 3     rosuvastatin (CRESTOR) 20 MG tablet Take 1 tablet (20 mg) by mouth daily 30 tablet 11     guaiFENesin-codeine (ROBITUSSIN AC) 100-10 MG/5ML solution Take 10 mLs by mouth every 6 hours as needed for cough (Patient not taking: Reported on 11/7/2022) 118 mL 0     ALLERGIES     Allergies   Allergen Reactions      "Mold Shortness Of Breath     Cats      Congestion and drainage     Grass Cough     No Known Allergies      PAST MEDICAL, SURGICAL, FAMILY, SOCIAL HISTORY:  History was reviewed and updated as needed, see medical record.  Review of Systems:  Review Of Systems  Skin: negative  Eyes: negative  Ears/Nose/Throat: Positive for sinus congestion/infection  Respiratory: Shortness of breath- intermittent from acute illness. No GUERRA.  Cardiovascular: negative for, palpitations, tachycardia, irregular heart beat, chest pain, exertional chest pain or pressure, paroxysmal nocturnal dyspnea, lower extremity edema, syncope or near-syncope and exercise intolerance  Gastrointestinal: negative  Genitourinary: positive for urinary tract infection  Musculoskeletal: positive for joint pain and knee pain/discomfort.  Neurologic: negative  Psychiatric: negative  Hematologic/Lymphatic/Immunologic: negative  Endocrine: negative     Physical Exam:    Vitals: /84 (BP Location: Right arm, Patient Position: Right side)   Pulse 86   Ht 1.778 m (5' 10\")   Wt 92.9 kg (204 lb 11.2 oz)   SpO2 96%   BMI 29.37 kg/m    Constitutional: Appears stated age, well nourished, NAD.  Eyes: Pupils equal, round. Sclerae anicteric.   HEENT: Normocephalic, atraumatic.   Neck: Supple. Carotid pulses full and equal. No carotid bruit.  No JVD appreciated.  Respiratory: Non-labored. Lungs CTAB. No crackles, wheezes, rhonchi, or rales.  Cardiovascular: RRR, normal S1 and S2. No M/G/R.  GI: Soft, non-distended, non-tender, bowel sounds present equally.  Skin: Warm and dry. No rashes, cyanosis, edema, or xanthelasma.  Musculoskeletal/Extremities: Symmetrical movement to all extremities. No edema.  Neurologic: No gross focal deficits. Alert, awake, and oriented to all spheres.  Psychiatric: Affect appropriate. Mentation normal.    Recent Lab Results:  LIPID RESULTS:  Lab Results   Component Value Date    CHOL 196 11/06/2022    CHOL 216 (H) 12/07/2020    HDL 24 " (L) 11/06/2022    HDL 42 12/07/2020     (H) 11/06/2022     (H) 12/07/2020    TRIG 244 (H) 11/06/2022    TRIG 92 12/07/2020    CHOLHDLRATIO 4.7 08/20/2014     LIVER ENZYME RESULTS:  Lab Results   Component Value Date    AST 22 11/06/2022    AST 25 12/07/2020    ALT 35 11/06/2022    ALT 40 12/07/2020     CBC RESULTS:  Lab Results   Component Value Date    WBC 5.2 02/18/2022    WBC 5.2 12/07/2020    RBC 5.64 02/18/2022    RBC 5.34 12/07/2020    HGB 16.6 02/18/2022    HGB 15.8 12/07/2020    HCT 49.9 02/18/2022    HCT 46.5 12/07/2020    MCV 89 02/18/2022    MCV 87 12/07/2020    MCH 29.4 02/18/2022    MCH 29.6 12/07/2020    MCHC 33.3 02/18/2022    MCHC 34.0 12/07/2020    RDW 13.6 02/18/2022    RDW 13.4 12/07/2020     02/18/2022     12/07/2020     BMP RESULTS:  Lab Results   Component Value Date     12/07/2020    POTASSIUM 4.1 12/07/2020    CHLORIDE 104 12/07/2020    CO2 25 12/07/2020    ANIONGAP 6 12/07/2020    GLC 89 12/07/2020    BUN 23 12/07/2020    CR 1.12 12/07/2020    GFRESTIMATED 72 12/07/2020    GFRESTBLACK 84 12/07/2020    MAR 9.1 12/07/2020      A1C RESULTS:  Lab Results   Component Value Date    A1C 5.9 (H) 05/18/2022    A1C 6.1 (H) 12/07/2020     INR RESULTS:  Lab Results   Component Value Date    INR 1.05 06/14/2018    INR 1.03 06/07/2007

## 2022-11-20 ENCOUNTER — LAB (OUTPATIENT)
Dept: LAB | Facility: CLINIC | Age: 57
End: 2022-11-20
Payer: COMMERCIAL

## 2022-11-20 DIAGNOSIS — R93.1 ELEVATED CORONARY ARTERY CALCIUM SCORE: ICD-10-CM

## 2022-11-20 LAB
CHOLEST SERPL-MCNC: 142 MG/DL
HDLC SERPL-MCNC: 37 MG/DL
LDLC SERPL CALC-MCNC: 82 MG/DL
NONHDLC SERPL-MCNC: 105 MG/DL
TRIGL SERPL-MCNC: 115 MG/DL

## 2022-11-20 PROCEDURE — 36415 COLL VENOUS BLD VENIPUNCTURE: CPT

## 2022-11-20 PROCEDURE — 80061 LIPID PANEL: CPT

## 2022-12-01 SDOH — HEALTH STABILITY: PHYSICAL HEALTH: ON AVERAGE, HOW MANY MINUTES DO YOU ENGAGE IN EXERCISE AT THIS LEVEL?: 30 MIN

## 2022-12-01 SDOH — ECONOMIC STABILITY: INCOME INSECURITY: HOW HARD IS IT FOR YOU TO PAY FOR THE VERY BASICS LIKE FOOD, HOUSING, MEDICAL CARE, AND HEATING?: NOT HARD AT ALL

## 2022-12-01 SDOH — HEALTH STABILITY: PHYSICAL HEALTH: ON AVERAGE, HOW MANY DAYS PER WEEK DO YOU ENGAGE IN MODERATE TO STRENUOUS EXERCISE (LIKE A BRISK WALK)?: 3 DAYS

## 2022-12-01 SDOH — ECONOMIC STABILITY: FOOD INSECURITY: WITHIN THE PAST 12 MONTHS, THE FOOD YOU BOUGHT JUST DIDN'T LAST AND YOU DIDN'T HAVE MONEY TO GET MORE.: NEVER TRUE

## 2022-12-01 SDOH — ECONOMIC STABILITY: TRANSPORTATION INSECURITY
IN THE PAST 12 MONTHS, HAS THE LACK OF TRANSPORTATION KEPT YOU FROM MEDICAL APPOINTMENTS OR FROM GETTING MEDICATIONS?: NO

## 2022-12-01 SDOH — ECONOMIC STABILITY: INCOME INSECURITY: IN THE LAST 12 MONTHS, WAS THERE A TIME WHEN YOU WERE NOT ABLE TO PAY THE MORTGAGE OR RENT ON TIME?: NO

## 2022-12-01 SDOH — ECONOMIC STABILITY: TRANSPORTATION INSECURITY
IN THE PAST 12 MONTHS, HAS LACK OF TRANSPORTATION KEPT YOU FROM MEETINGS, WORK, OR FROM GETTING THINGS NEEDED FOR DAILY LIVING?: NO

## 2022-12-01 SDOH — ECONOMIC STABILITY: FOOD INSECURITY: WITHIN THE PAST 12 MONTHS, YOU WORRIED THAT YOUR FOOD WOULD RUN OUT BEFORE YOU GOT MONEY TO BUY MORE.: NEVER TRUE

## 2022-12-01 ASSESSMENT — ENCOUNTER SYMPTOMS
FEVER: 0
JOINT SWELLING: 1
ARTHRALGIAS: 1
CHILLS: 0
DYSURIA: 0
NERVOUS/ANXIOUS: 0
DIZZINESS: 0
HEADACHES: 0
SORE THROAT: 0
MYALGIAS: 1
COUGH: 0
NAUSEA: 0
CONSTIPATION: 0
DIARRHEA: 0
PALPITATIONS: 0
ABDOMINAL PAIN: 0
HEMATURIA: 0
FREQUENCY: 0
HEMATOCHEZIA: 0
EYE PAIN: 0
SHORTNESS OF BREATH: 0
WEAKNESS: 0
PARESTHESIAS: 0
HEARTBURN: 0

## 2022-12-01 ASSESSMENT — LIFESTYLE VARIABLES
AUDIT-C TOTAL SCORE: -1
HOW OFTEN DO YOU HAVE A DRINK CONTAINING ALCOHOL: PATIENT DECLINED
SKIP TO QUESTIONS 9-10: 0
HOW OFTEN DO YOU HAVE SIX OR MORE DRINKS ON ONE OCCASION: NEVER
HOW MANY STANDARD DRINKS CONTAINING ALCOHOL DO YOU HAVE ON A TYPICAL DAY: PATIENT DOES NOT DRINK

## 2022-12-01 ASSESSMENT — SOCIAL DETERMINANTS OF HEALTH (SDOH)
IN A TYPICAL WEEK, HOW MANY TIMES DO YOU TALK ON THE PHONE WITH FAMILY, FRIENDS, OR NEIGHBORS?: NEVER
HOW OFTEN DO YOU ATTEND CHURCH OR RELIGIOUS SERVICES?: NEVER
HOW OFTEN DO YOU GET TOGETHER WITH FRIENDS OR RELATIVES?: TWICE A WEEK
DO YOU BELONG TO ANY CLUBS OR ORGANIZATIONS SUCH AS CHURCH GROUPS UNIONS, FRATERNAL OR ATHLETIC GROUPS, OR SCHOOL GROUPS?: YES

## 2022-12-08 ENCOUNTER — OFFICE VISIT (OUTPATIENT)
Dept: FAMILY MEDICINE | Facility: CLINIC | Age: 57
End: 2022-12-08
Payer: COMMERCIAL

## 2022-12-08 VITALS
SYSTOLIC BLOOD PRESSURE: 138 MMHG | DIASTOLIC BLOOD PRESSURE: 72 MMHG | WEIGHT: 204.4 LBS | HEART RATE: 81 BPM | OXYGEN SATURATION: 96 % | HEIGHT: 70 IN | BODY MASS INDEX: 29.26 KG/M2

## 2022-12-08 DIAGNOSIS — G47.09 OTHER INSOMNIA: ICD-10-CM

## 2022-12-08 DIAGNOSIS — L60.0 INGROWN TOENAIL OF RIGHT FOOT: ICD-10-CM

## 2022-12-08 DIAGNOSIS — Z00.00 ROUTINE HISTORY AND PHYSICAL EXAMINATION OF ADULT: Primary | ICD-10-CM

## 2022-12-08 DIAGNOSIS — E78.5 HYPERLIPIDEMIA LDL GOAL <130: ICD-10-CM

## 2022-12-08 DIAGNOSIS — R73.01 ELEVATED FASTING GLUCOSE: ICD-10-CM

## 2022-12-08 DIAGNOSIS — R39.11 BENIGN PROSTATIC HYPERPLASIA WITH URINARY HESITANCY: ICD-10-CM

## 2022-12-08 DIAGNOSIS — M79.645 PAIN OF LEFT THUMB: ICD-10-CM

## 2022-12-08 DIAGNOSIS — N40.1 BENIGN PROSTATIC HYPERPLASIA WITH URINARY HESITANCY: ICD-10-CM

## 2022-12-08 PROCEDURE — 99396 PREV VISIT EST AGE 40-64: CPT | Mod: 25 | Performed by: PHYSICIAN ASSISTANT

## 2022-12-08 PROCEDURE — 90682 RIV4 VACC RECOMBINANT DNA IM: CPT | Performed by: PHYSICIAN ASSISTANT

## 2022-12-08 PROCEDURE — 90472 IMMUNIZATION ADMIN EACH ADD: CPT | Performed by: PHYSICIAN ASSISTANT

## 2022-12-08 PROCEDURE — 90471 IMMUNIZATION ADMIN: CPT | Performed by: PHYSICIAN ASSISTANT

## 2022-12-08 PROCEDURE — 90750 HZV VACC RECOMBINANT IM: CPT | Performed by: PHYSICIAN ASSISTANT

## 2022-12-08 RX ORDER — METFORMIN HCL 500 MG
TABLET, EXTENDED RELEASE 24 HR ORAL
Qty: 90 TABLET | Refills: 3 | Status: SHIPPED | OUTPATIENT
Start: 2022-12-08 | End: 2023-12-13

## 2022-12-08 RX ORDER — FENOFIBRATE 145 MG/1
145 TABLET, COATED ORAL DAILY
Qty: 90 TABLET | Refills: 3 | Status: SHIPPED | OUTPATIENT
Start: 2022-12-08 | End: 2023-12-13

## 2022-12-08 RX ORDER — TRAZODONE HYDROCHLORIDE 50 MG/1
50 TABLET, FILM COATED ORAL
Qty: 90 TABLET | Refills: 1 | Status: SHIPPED | OUTPATIENT
Start: 2022-12-08 | End: 2023-07-03

## 2022-12-08 RX ORDER — TADALAFIL 5 MG/1
5 TABLET ORAL EVERY 24 HOURS
Qty: 90 TABLET | Refills: 1 | Status: SHIPPED | OUTPATIENT
Start: 2022-12-08 | End: 2023-01-03

## 2022-12-08 ASSESSMENT — ENCOUNTER SYMPTOMS
HEMATOCHEZIA: 0
PALPITATIONS: 0
NAUSEA: 0
CHILLS: 0
FEVER: 0
DIFFICULTY URINATING: 1
MYALGIAS: 1
FREQUENCY: 0
COUGH: 0
DIZZINESS: 0
WEAKNESS: 0
HEARTBURN: 0
EYE PAIN: 0
JOINT SWELLING: 1
DYSURIA: 0
NERVOUS/ANXIOUS: 0
HEADACHES: 0
ABDOMINAL PAIN: 0
DIARRHEA: 0
SHORTNESS OF BREATH: 0
PARESTHESIAS: 0
ARTHRALGIAS: 1
SORE THROAT: 0
CONSTIPATION: 0
HEMATURIA: 0

## 2022-12-08 NOTE — PROGRESS NOTES
SUBJECTIVE:   Patient has been advised of split billing requirements and indicates understanding: No  Healthy Habits:     Getting at least 3 servings of Calcium per day:  Yes    Bi-annual eye exam:  Yes    Dental care twice a year:  Yes    Sleep apnea or symptoms of sleep apnea:  Daytime drowsiness    Diet:  Regular (no restrictions)    Frequency of exercise:  2-3 days/week    Duration of exercise:  15-30 minutes    Taking medications regularly:  Yes    Medication side effects:  None    PHQ-2 Total Score: 0    Additional concerns today:  No      Today's PHQ-2 Score:   PHQ-2 ( 1999 [employer]) 12/1/2022   Q1: Little interest or pleasure in doing things 0   Q2: Feeling down, depressed or hopeless 0   PHQ-2 Score 0   PHQ-2 Total Score (12-17 Years)- Positive if 3 or more points; Administer PHQ-A if positive -   Q1: Little interest or pleasure in doing things Not at all   Q2: Feeling down, depressed or hopeless Not at all   PHQ-2 Score 0           Social History     Tobacco Use     Smoking status: Never     Smokeless tobacco: Never     Tobacco comments:     Some sporadic use 40+ years ago- cigars   Substance Use Topics     Alcohol use: Yes     Comment: 1-2 drinks a week     If you drink alcohol do you typically have >3 drinks per day or >7 drinks per week? No    Alcohol Use 12/1/2022   Prescreen: >3 drinks/day or >7 drinks/week? No   Prescreen: >3 drinks/day or >7 drinks/week? -       Last PSA:   PSA   Date Value Ref Range Status   12/07/2020 0.87 0 - 4 ug/L Final     Comment:     Assay Method:  Chemiluminescence using Siemens Vista analyzer     Prostate Specific Antigen Screen   Date Value Ref Range Status   02/18/2022 1.05 0.00 - 4.00 ug/L Final       Reviewed orders with patient. Reviewed health maintenance and updated orders accordingly - Yes  Lab work is in process    Reviewed and updated as needed this visit by clinical staff   Tobacco  Allergies  Meds              Reviewed and updated as needed this visit by  "Provider                 Past Medical History:   Diagnosis Date     Arthritis 1/2011     Blood clotting disorder (H)      Cancer (H)     Skin     Chiari malformation type I (H)      Elevated fasting glucose 1/19/2012     Headaches, migraine      Headaches, migraine      Other and unspecified hyperlipidemia      Pedal edema 6/10/2021     Rotator cuff tear 1/22/2012     Rotator cuff tear 1/22/2012     Rotator cuff tendinitis 1/2011    Inj. Dr. Cheney.      Spinal headache 2007    spontaneous spinal fluid leak - 2 blood patches     Urethral polyp 6-24-10    Dr. Card     Urethral polyp 6/24/2010    Dr. Card      Varicose veins of both lower extremities 6/10/2021        Review of Systems   Constitutional: Negative for chills and fever.   HENT: Positive for hearing loss. Negative for congestion, ear pain and sore throat.    Eyes: Negative for pain and visual disturbance.   Respiratory: Negative for cough and shortness of breath.    Cardiovascular: Negative for chest pain, palpitations and peripheral edema.   Gastrointestinal: Negative for abdominal pain, constipation, diarrhea, heartburn, hematochezia and nausea.   Genitourinary: Positive for difficulty urinating. Negative for dysuria, frequency, genital sores, hematuria, impotence, penile discharge and urgency.        Difficulty with erection    Musculoskeletal: Positive for arthralgias, joint swelling and myalgias.   Skin: Negative for rash.   Neurological: Negative for dizziness, weakness, headaches and paresthesias.   Psychiatric/Behavioral: Negative for mood changes. The patient is not nervous/anxious.          OBJECTIVE:   /72   Pulse 81   Ht 1.778 m (5' 10\")   Wt 92.7 kg (204 lb 6.4 oz)   SpO2 96%   BMI 29.33 kg/m      Physical Exam  GENERAL: healthy, alert and no distress  EYES: Eyes grossly normal to inspection, PERRL and conjunctivae and sclerae normal  HENT: ear canals and TM's normal, nose and mouth without ulcers or lesions  NECK: no " adenopathy, no asymmetry, masses, or scars and thyroid normal to palpation  RESP: lungs clear to auscultation - no rales, rhonchi or wheezes  CV: regular rate and rhythm, normal S1 S2, no S3 or S4, no murmur, click or rub, no peripheral edema and peripheral pulses strong  ABDOMEN: soft, nontender, no hepatosplenomegaly, no masses and bowel sounds normal  MS ingrown right great toenail, no infection. Tender left CMC joint with crepitus   SKIN: no suspicious lesions or rashes  NEURO: Normal strength and tone, mentation intact and speech normal  PSYCH: mentation appears normal, affect normal/bright        ASSESSMENT/PLAN:   (Z00.00) Routine history and physical examination of adult  (primary encounter diagnosis)  Comment:   Plan:     (R73.01) Elevated fasting glucose  Comment:   Plan: metFORMIN (GLUCOPHAGE XR) 500 MG 24 hr tablet            (E78.5) Hyperlipidemia LDL goal <130  Comment:   Plan: fenofibrate (TRICOR) 145 MG tablet            (N40.1,  R39.11) Benign prostatic hyperplasia with urinary hesitancy  Comment: Risks/benefits of medication use discussed with patient. Patient reports understanding and accepts trial of medication.  If cost is too much or not covered please contact us.   Plan: tadalafil (CIALIS) 5 MG tablet            (G47.09) Other insomnia  Comment: trial of trazodone  Plan: traZODone (DESYREL) 50 MG tablet            (L60.0) Ingrown toenail of right foot  Comment:   Plan: Orthopedic  Referral            (M79.645) Pain of left thumb  Comment:   Plan: Orthopedic  Referral        [facility], Dr. Mantilla      Patient has been advised of split billing requirements and indicates understanding: No      COUNSELING:   Reviewed preventive health counseling, as reflected in patient instructions       Regular exercise       Healthy diet/nutrition       Vision screening      BMI:   Estimated body mass index is 29.33 kg/m  as calculated from the following:    Height as of this encounter: 1.778 m (5'  "10\").    Weight as of this encounter: 92.7 kg (204 lb 6.4 oz).   Weight management plan: Discussed healthy diet and exercise guidelines      He reports that he has never smoked. He has never used smokeless tobacco.        Cassandra Villafana PA-C  [facility]  "

## 2022-12-21 ENCOUNTER — OFFICE VISIT (OUTPATIENT)
Dept: PODIATRY | Facility: CLINIC | Age: 57
End: 2022-12-21
Attending: PHYSICIAN ASSISTANT
Payer: COMMERCIAL

## 2022-12-21 VITALS — SYSTOLIC BLOOD PRESSURE: 128 MMHG | WEIGHT: 204 LBS | DIASTOLIC BLOOD PRESSURE: 72 MMHG | BODY MASS INDEX: 29.27 KG/M2

## 2022-12-21 DIAGNOSIS — L60.0 INGROWN TOENAIL OF RIGHT FOOT: ICD-10-CM

## 2022-12-21 DIAGNOSIS — M79.671 RIGHT FOOT PAIN: Primary | ICD-10-CM

## 2022-12-21 PROCEDURE — 11730 AVULSION NAIL PLATE SIMPLE 1: CPT | Mod: T5 | Performed by: PODIATRIST

## 2022-12-21 PROCEDURE — 99203 OFFICE O/P NEW LOW 30 MIN: CPT | Mod: 25 | Performed by: PODIATRIST

## 2022-12-21 NOTE — PROGRESS NOTES
PATIENT HISTORY:  Cassandra Villafana PA-C requested I see this patient for their foot issue.  Willard Manzano is a 57 year old male who presents to clinic for right ingrown nail.  States its been sore for the last month or so.  Denies injury.  Pain can be 4 out of 10 at its worst.  Worse with pressure.  Wondering what can be done for it.    Review of Systems:  Patient denies fever, chills, rash, wound, stiffness, limping, numbness, weakness, heart burn, blood in stool, chest pain with activity, calf pain when walking, shortness of breath with activity, chronic cough, easy bleeding/bruising, swelling of ankles, excessive thirst, fatigue, depression, anxiety.      PAST MEDICAL HISTORY:   Past Medical History:   Diagnosis Date     Arthritis 1/2011     Blood clotting disorder (H)      Cancer (H)     Skin     Chiari malformation type I (H)      Elevated fasting glucose 1/19/2012     Headaches, migraine      Headaches, migraine      Other and unspecified hyperlipidemia      Pedal edema 6/10/2021     Rotator cuff tear 1/22/2012     Rotator cuff tear 1/22/2012     Rotator cuff tendinitis 1/2011    Inj. Dr. Cheney.      Spinal headache 2007    spontaneous spinal fluid leak - 2 blood patches     Urethral polyp 6-24-10    Dr. Card     Urethral polyp 6/24/2010    Dr. Card      Varicose veins of both lower extremities 6/10/2021        PAST SURGICAL HISTORY:   Past Surgical History:   Procedure Laterality Date     ABDOMEN SURGERY  2006    Umbilical Hernia repair     BIOPSY      arms cancer, 2 spots     COLONOSCOPY  12/29/10    non-adenomatous polyp. Advised 5 yr f/u.      COLONOSCOPY N/A 2/19/2021    Procedure: COLONOSCOPY;  Surgeon: Broderick Rivera MD;  Location:  GI     HC REMOVAL OF TONSILS,<13 Y/O       HERNIA REPAIR  2006     ORTHOPEDIC SURGERY  2018    Rotator Cuff Repair     SURGICAL HISTORY OF -   2/2006    umbilical hernia repair with mesh     ZZHC COLONOSCOPY THRU STOMA, DIAGNOSTIC  6/26/08    normal. 3 yr f/u due  to brother CA age 43.     Z VASECTOMY UNILAT/BILAT W POSTOP SEMEN          MEDICATIONS:   Current Outpatient Medications:      aspirin 81 MG EC tablet, Take 81 mg by mouth daily, Disp: , Rfl:      fenofibrate (TRICOR) 145 MG tablet, Take 1 tablet (145 mg) by mouth daily, Disp: 90 tablet, Rfl: 3     loratadine (CLARITIN) 10 MG tablet, Take 10 mg by mouth daily, Disp: , Rfl:      metFORMIN (GLUCOPHAGE XR) 500 MG 24 hr tablet, TAKE 1 TABLET(500 MG) BY MOUTH DAILY WITH DINNER Strength: 500 mg, Disp: 90 tablet, Rfl: 3     Omega-3 Fatty Acids (FISH OIL PO), , Disp: , Rfl:      omeprazole (PRILOSEC) 40 MG DR capsule, TAKE 1 CAPSULE(40 MG) BY MOUTH DAILY 30 TO 60 MINUTES BEFORE A MEAL, Disp: 90 capsule, Rfl: 3     rosuvastatin (CRESTOR) 20 MG tablet, Take 1 tablet (20 mg) by mouth daily, Disp: 30 tablet, Rfl: 11     tadalafil (CIALIS) 5 MG tablet, Take 1 tablet (5 mg) by mouth every 24 hours, Disp: 90 tablet, Rfl: 1     traZODone (DESYREL) 50 MG tablet, Take 1 tablet (50 mg) by mouth nightly as needed for sleep, Disp: 90 tablet, Rfl: 1     ALLERGIES:    Allergies   Allergen Reactions     Mold Shortness Of Breath     Cats      Congestion and drainage     Grass Cough     No Known Allergies         SOCIAL HISTORY:   Social History     Socioeconomic History     Marital status:      Spouse name: Not on file     Number of children: Not on file     Years of education: Not on file     Highest education level: Not on file   Occupational History     Not on file   Tobacco Use     Smoking status: Never     Smokeless tobacco: Never     Tobacco comments:     Some sporadic use 40+ years ago- cigars   Vaping Use     Vaping Use: Never used   Substance and Sexual Activity     Alcohol use: Yes     Comment: 1-2 drinks a week     Drug use: No     Sexual activity: Yes     Partners: Female     Birth control/protection: Male Surgical     Comment: I'm fixed   Other Topics Concern     Parent/sibling w/ CABG, MI or angioplasty before 65F  55M? Yes     Comment: Brother   Social History Narrative     Not on file     Social Determinants of Health     Financial Resource Strain: Low Risk      Difficulty of Paying Living Expenses: Not hard at all   Food Insecurity: No Food Insecurity     Worried About Running Out of Food in the Last Year: Never true     Ran Out of Food in the Last Year: Never true   Transportation Needs: No Transportation Needs     Lack of Transportation (Medical): No     Lack of Transportation (Non-Medical): No   Physical Activity: Insufficiently Active     Days of Exercise per Week: 3 days     Minutes of Exercise per Session: 30 min   Stress: No Stress Concern Present     Feeling of Stress : Only a little   Social Connections: Moderately Isolated     Frequency of Communication with Friends and Family: Never     Frequency of Social Gatherings with Friends and Family: Twice a week     Attends [employer] Services: Never     Active Member of Clubs or Organizations: Yes     Attends Club or Organization Meetings: Not on file     Marital Status:    Intimate Partner Violence: Not on file   Housing Stability: Low Risk      Unable to Pay for Housing in the Last Year: No     Number of Places Lived in the Last Year: 1     Unstable Housing in the Last Year: No        FAMILY HISTORY:   Family History   Problem Relation Age of Onset     Diabetes Mother         dx in her 40's. overwt. Now IDDM.     Hypertension Mother      Lipids Mother      Gallbladder Disease Mother      Cancer Father          lung CA with brain mets age 65     Other Cancer Father         Lung     Substance Abuse Father         Alcoholism     Other Cancer Brother         Lung     Cardiovascular Brother         Loi. Brother MI at age 49. Had another stent at age 52     Diabetes Sister      Family History Negative Sister      Allergies Son      Substance Abuse Son      Family History Negative Son      Family History Negative Daughter      Breast Cancer Sister         Dx  bilateral breast CA at age 56.      Other Cancer Sister         Lung     Breast Cancer Sister         (pronounced Muh-jay)     Colon Cancer Brother         Diagnosed at age 40s     Other Cancer Brother         Lung     Substance Abuse Son         Alcoholism        EXAM:Vitals: /72   Wt 92.5 kg (204 lb)   BMI 29.27 kg/m        General appearance: Patient is alert and fully cooperative with history & exam.  No sign of distress is noted during the visit.     Psychiatric: Affect is pleasant & appropriate.  Patient appears motivated to improve health.     Respiratory: Breathing is regular & unlabored while sitting.     HEENT: Hearing is intact to spoken word.  Speech is clear.  No gross evidence of visual impairment that would impact ambulation.     Dermatologic: Lateral border of right great toenail is incurvated.  Minimal localized redness and pain on palpation.     Vascular: DP & PT pulses are intact & regular bilaterally.  No significant edema or varicosities noted.  CFT and skin temperature is normal to both lower extremities.     Neurologic: Lower extremity sensation is intact to light touch.  No evidence of weakness or contracture in the lower extremities.  No evidence of neuropathy.     Musculoskeletal: Patient is ambulatory without assistive device or brace.  No gross ankle deformity noted.  No foot or ankle joint effusion is noted.     ASSESSMENT:    Ingrown toenail of right foot  Right foot pain       Medical Decision Making/Plan:  Reviewed [facility] in [facility]. The potential causes and nature of an ingrown toenail were discussed with the patient.  We reviewed the natural history/prognosis of the condition and potential risks if no treatment is provided.      Treatment options discussed included conservative management (oral antibiotics, soaking of foot, adequate width shoes)  as well as surgical management (partial or total nail removal).  The pros and cons of both forms of treatment were  reviewed.      After thorough discussion and answering all questions, the patient elected to have the border removed.  He will soak the foot twice a day for 2 weeks and apply antibiotic ointment and a bandage.  And he will call for the questions or concerns.     Procedure: After verbal consent, the right big toe was anesthetized with 5cc's of 1% lidocaine plain. A tourniquet was applied to the toe. The lateral border was then raised from the nail bed and then cut the length of the nail.  The offending nail border was then removed. Bacitracin was applied to the nail bed.  The tourniquet was removed.  Bandage was applied to the toe.  The patient tolerated the procedure and anesthesia well.      Patient risk factor: Patient is at low risk for infection.        Sumi Navarrete DPM, Podiatry/Foot and Ankle Surgery

## 2022-12-21 NOTE — PATIENT INSTRUCTIONS
Thank you for choosing [facility] Podiatry / Foot & Ankle Surgery!    DR PASTRANA'S CLINIC:  [facility]   [address]   [place] [address]      TRIAGE LINE: [phone]  APPOINTMENTS: [phone]  RADIOLOGY: [phone]  SET UP SURGERY: [phone]  FAX NUMBER: [phone]  BILLING QUESTIONS: [phone]       Follow up: as needed      INGROWN TOENAILS  When a toenail is ingrown, it is curved and grows into the skin, usually at the nail borders (the sides of the nail). This  digging in  of the nail irritates the skin, often creating pain, redness, swelling, and warmth in the toe.  If an ingrown nail causes a break in the skin, bacteria may enter and cause an infection in the area, which is often marked by drainage and a foul odor. However, even if the toe isn t painful, red, swollen, or warm, a nail that curves downward into the skin can progress to an infection.  CAUSES:  Heredity: In many people, the tendency for ingrown toenails is inherited.   Trauma: Sometimes an ingrown toenail is the result of trauma, such as stubbing your toe, having an object fall on your toe, or engaging in activities that involve repeated pressure on the toes, such as kicking or running.   Improper Trimming:  The most common cause of ingrown toenails is cutting your nails too short. This encourages the skin next to the nail to fold over the nail.   Improperly Sized Footwear: Ingrown toenails can result from wearing socks and shoes that are tight or short.   Nail Conditions: Ingrown toenails can be caused by nail problems, such as fungal infections or losing a nail due to trauma.   TREATMENT: Sometimes initial treatment for ingrown toenails can be safely performed at home. However, home treatment is strongly discouraged if an infection is suspected, or for those who have medical conditions that put feet at high risk, such as diabetes, nerve damage in the foot, or poor circulation.  Home care: If you  don t have an infection or any of the above medical conditions, you can soak your foot in room-temperature water (adding Epsom s salt may be recommended by your doctor), and gently massage the side of the nail fold to help reduce the inflammation.  Avoid attempting  bathroom surgery.  Repeated cutting of the nail can cause the condition to worsen over time. If your symptoms fail to improve, it s time to see a foot and ankle surgeon.  Physician care: After examining the toe, the foot and ankle surgeon will select the treatment best suited for you. If an infection is present, an oral antibiotic may be prescribed.  Sometimes a minor surgical procedure, often performed in the office, will ease the pain and remove the offending nail. After applying a local anesthetic, the doctor removes part of the nail s side border. Some nails may become ingrown again, requiring removal of the nail root.  Following the nail procedure, a light bandage will be applied. Most people experience very little pain after surgery and may resume normal activity the next day. If your surgeon has prescribed an oral antibiotic, be sure to take all the medication, even if your symptoms have improved.  PREVENTION:  Proper Trimming: Cut toenails in a fairly straight line, and don t cut them too short. You should be able to get your fingernail under the sides and end of the nail.   Well-fitting Footwear: Don t wear shoes that are short or tight in the toe area. Avoid shoes that are loose, because they too cause pressure on the toes, especially when running or walking briskly.     INGROWN TOENAIL REMOVAL AFTERCARE   Go directly home and elevate the affected foot on one or two pillows for the remainder of the day/evening if possible. Your toe may stay numb anywhere from 2-8 hours.   Take Tylenol, ibuprofen or another anti-inflammatory as needed for pain.   Take antibiotic if that has been prescribed. Finish the entire prescribed antibiotic even if your  symptoms have improved.   The evening of the procedure, soak/wash the affected area in warm water (you may add Epsom salt) for 5 to 10 minutes. Do this twice a day for 2-4 weeks (6-8 weeks if you had phenol) (you may count showering/bathing as one soak).  After soaks, pat the area dry and then allow to airdry for a few minutes. Apply antibiotic ointment to the area and cover with 2 X 2 gauze and paper tape or band-aid.  You may pursue everyday activities as tolerated with either an open toe shoe or cut-out shoe as needed or you may wear regular shoes if no pain is noted.  Watch for any signs and symptoms of infection such as: redness, red streaks going up the foot/leg, swelling, pus or foul odor. Those that have had the phenol procedure, the toe will drain longer and will look like it is infected because it is a chemical burn.   Please call with questions.

## 2022-12-21 NOTE — LETTER
12/21/2022         RE: Willard Manzano  [address] [facility] [address]        Dear Colleague,    Thank you for referring your patient, Willard Manzano, to the [facility] PODIATRY. Please see a copy of my visit note below.    PATIENT HISTORY:  Cassandra Villafana PA-C requested I see this patient for their foot issue.  Willard Manzano is a 57 year old male who presents to clinic for right ingrown nail.  States its been sore for the last month or so.  Denies injury.  Pain can be 4 out of 10 at its worst.  Worse with pressure.  Wondering what can be done for it.    Review of Systems:  Patient denies fever, chills, rash, wound, stiffness, limping, numbness, weakness, heart burn, blood in stool, chest pain with activity, calf pain when walking, shortness of breath with activity, chronic cough, easy bleeding/bruising, swelling of ankles, excessive thirst, fatigue, depression, anxiety.      PAST MEDICAL HISTORY:   Past Medical History:   Diagnosis Date     Arthritis 1/2011     Blood clotting disorder (H)      Cancer (H)     Skin     Chiari malformation type I (H)      Elevated fasting glucose 1/19/2012     Headaches, migraine      Headaches, migraine      Other and unspecified hyperlipidemia      Pedal edema 6/10/2021     Rotator cuff tear 1/22/2012     Rotator cuff tear 1/22/2012     Rotator cuff tendinitis 1/2011    Inj. Dr. Cheney.      Spinal headache 2007    spontaneous spinal fluid leak - 2 blood patches     Urethral polyp 6-24-10    Dr. Card     Urethral polyp 6/24/2010    Dr. Card      Varicose veins of both lower extremities 6/10/2021        PAST SURGICAL HISTORY:   Past Surgical History:   Procedure Laterality Date     ABDOMEN SURGERY  2006    Umbilical Hernia repair     BIOPSY      arms cancer, 2 spots     COLONOSCOPY  12/29/10    non-adenomatous polyp. Advised 5 yr f/u.      COLONOSCOPY N/A 2/19/2021    Procedure: COLONOSCOPY;  Surgeon: Broderick Rivera MD;  Location: [facility]  REMOVAL OF TONSILS,<13 Y/O       HERNIA REPAIR  2006     ORTHOPEDIC SURGERY  2018    Rotator Cuff Repair     SURGICAL HISTORY OF -   2/2006    umbilical hernia repair with mesh     ZZHC COLONOSCOPY THRU STOMA, DIAGNOSTIC  6/26/08    normal. 3 yr f/u due to brother CA age 43.     ZZHC VASECTOMY UNILAT/BILAT W POSTOP SEMEN          MEDICATIONS:   Current Outpatient Medications:      aspirin 81 MG EC tablet, Take 81 mg by mouth daily, Disp: , Rfl:      fenofibrate (TRICOR) 145 MG tablet, Take 1 tablet (145 mg) by mouth daily, Disp: 90 tablet, Rfl: 3     loratadine (CLARITIN) 10 MG tablet, Take 10 mg by mouth daily, Disp: , Rfl:      metFORMIN (GLUCOPHAGE XR) 500 MG 24 hr tablet, TAKE 1 TABLET(500 MG) BY MOUTH DAILY WITH DINNER Strength: 500 mg, Disp: 90 tablet, Rfl: 3     Omega-3 Fatty Acids (FISH OIL PO), , Disp: , Rfl:      omeprazole (PRILOSEC) 40 MG DR capsule, TAKE 1 CAPSULE(40 MG) BY MOUTH DAILY 30 TO 60 MINUTES BEFORE A MEAL, Disp: 90 capsule, Rfl: 3     rosuvastatin (CRESTOR) 20 MG tablet, Take 1 tablet (20 mg) by mouth daily, Disp: 30 tablet, Rfl: 11     tadalafil (CIALIS) 5 MG tablet, Take 1 tablet (5 mg) by mouth every 24 hours, Disp: 90 tablet, Rfl: 1     traZODone (DESYREL) 50 MG tablet, Take 1 tablet (50 mg) by mouth nightly as needed for sleep, Disp: 90 tablet, Rfl: 1     ALLERGIES:    Allergies   Allergen Reactions     Mold Shortness Of Breath     Cats      Congestion and drainage     Grass Cough     No Known Allergies         SOCIAL HISTORY:   Social History     Socioeconomic History     Marital status:      Spouse name: Not on file     Number of children: Not on file     Years of education: Not on file     Highest education level: Not on file   Occupational History     Not on file   Tobacco Use     Smoking status: Never     Smokeless tobacco: Never     Tobacco comments:     Some sporadic use 40+ years ago- cigars   Vaping Use     Vaping Use: Never used   Substance and Sexual Activity      Alcohol use: Yes     Comment: 1-2 drinks a week     Drug use: No     Sexual activity: Yes     Partners: Female     Birth control/protection: Male Surgical     Comment: I'm fixed   Other Topics Concern     Parent/sibling w/ CABG, MI or angioplasty before 65F 55M? Yes     Comment: Brother   Social History Narrative     Not on file     Social Determinants of Health     Financial Resource Strain: Low Risk      Difficulty of Paying Living Expenses: Not hard at all   Food Insecurity: No Food Insecurity     Worried About Running Out of Food in the Last Year: Never true     Ran Out of Food in the Last Year: Never true   Transportation Needs: No Transportation Needs     Lack of Transportation (Medical): No     Lack of Transportation (Non-Medical): No   Physical Activity: Insufficiently Active     Days of Exercise per Week: 3 days     Minutes of Exercise per Session: 30 min   Stress: No Stress Concern Present     Feeling of Stress : Only a little   Social Connections: Moderately Isolated     Frequency of Communication with Friends and Family: Never     Frequency of Social Gatherings with Friends and Family: Twice a week     Attends [employer] Services: Never     Active Member of Clubs or Organizations: Yes     Attends Club or Organization Meetings: Not on file     Marital Status:    Intimate Partner Violence: Not on file   Housing Stability: Low Risk      Unable to Pay for Housing in the Last Year: No     Number of Places Lived in the Last Year: 1     Unstable Housing in the Last Year: No        FAMILY HISTORY:   Family History   Problem Relation Age of Onset     Diabetes Mother         dx in her 40's. overwt. Now IDDM.     Hypertension Mother      Lipids Mother      Gallbladder Disease Mother      Cancer Father          lung CA with brain mets age 65     Other Cancer Father         Lung     Substance Abuse Father         Alcoholism     Other Cancer Brother         Lung     Cardiovascular Brother         Yohan  Brother MI at age 49. Had another stent at age 52     Diabetes Sister      Family History Negative Sister      Allergies Son      Substance Abuse Son      Family History Negative Son      Family History Negative Daughter      Breast Cancer Sister         Dx bilateral breast CA at age 56.      Other Cancer Sister         Lung     Breast Cancer Sister         (pronounced Muh-jay)     Colon Cancer Brother         Diagnosed at age 40s     Other Cancer Brother         Lung     Substance Abuse Son         Alcoholism        EXAM:Vitals: /72   Wt 92.5 kg (204 lb)   BMI 29.27 kg/m        General appearance: Patient is alert and fully cooperative with history & exam.  No sign of distress is noted during the visit.     Psychiatric: Affect is pleasant & appropriate.  Patient appears motivated to improve health.     Respiratory: Breathing is regular & unlabored while sitting.     HEENT: Hearing is intact to spoken word.  Speech is clear.  No gross evidence of visual impairment that would impact ambulation.     Dermatologic: Lateral border of right great toenail is incurvated.  Minimal localized redness and pain on palpation.     Vascular: DP & PT pulses are intact & regular bilaterally.  No significant edema or varicosities noted.  CFT and skin temperature is normal to both lower extremities.     Neurologic: Lower extremity sensation is intact to light touch.  No evidence of weakness or contracture in the lower extremities.  No evidence of neuropathy.     Musculoskeletal: Patient is ambulatory without assistive device or brace.  No gross ankle deformity noted.  No foot or ankle joint effusion is noted.     ASSESSMENT:    Ingrown toenail of right foot  Right foot pain       Medical Decision Making/Plan:  Reviewed patient's chart in [facility]. The potential causes and nature of an ingrown toenail were discussed with the patient.  We reviewed the natural history/prognosis of the condition and potential risks if no treatment is  provided.      Treatment options discussed included conservative management (oral antibiotics, soaking of foot, adequate width shoes)  as well as surgical management (partial or total nail removal).  The pros and cons of both forms of treatment were reviewed.      After thorough discussion and answering all questions, the patient elected to have the border removed.  He will soak the foot twice a day for 2 weeks and apply antibiotic ointment and a bandage.  And he will call for the questions or concerns.     Procedure: After verbal consent, the right big toe was anesthetized with 5cc's of 1% lidocaine plain. A tourniquet was applied to the toe. The lateral border was then raised from the nail bed and then cut the length of the nail.  The offending nail border was then removed. Bacitracin was applied to the nail bed.  The tourniquet was removed.  Bandage was applied to the toe.  The patient tolerated the procedure and anesthesia well.      Patient risk factor: Patient is at low risk for infection.        Sumi Navarrete DPM, Podiatry/Foot and Ankle Surgery        Again, thank you for allowing me to participate in the care of your patient.        Sincerely,        Sumi Navarrete DPM, Podiatry/Foot and Ankle Surgery

## 2023-01-03 ENCOUNTER — MYC MEDICAL ADVICE (OUTPATIENT)
Dept: FAMILY MEDICINE | Facility: CLINIC | Age: 58
End: 2023-01-03

## 2023-01-03 ENCOUNTER — TRANSFERRED RECORDS (OUTPATIENT)
Dept: HEALTH INFORMATION MANAGEMENT | Facility: CLINIC | Age: 58
End: 2023-01-03
Payer: COMMERCIAL

## 2023-01-03 DIAGNOSIS — N40.1 BENIGN PROSTATIC HYPERPLASIA WITH URINARY HESITANCY: Primary | ICD-10-CM

## 2023-01-03 DIAGNOSIS — R39.11 BENIGN PROSTATIC HYPERPLASIA WITH URINARY HESITANCY: Primary | ICD-10-CM

## 2023-01-03 RX ORDER — TAMSULOSIN HYDROCHLORIDE 0.4 MG/1
0.4 CAPSULE ORAL DAILY
Qty: 90 CAPSULE | Refills: 1 | Status: SHIPPED | OUTPATIENT
Start: 2023-01-03 | End: 2023-08-08

## 2023-01-25 ENCOUNTER — TELEPHONE (OUTPATIENT)
Dept: FAMILY MEDICINE | Facility: CLINIC | Age: 58
End: 2023-01-25

## 2023-01-25 ENCOUNTER — OFFICE VISIT (OUTPATIENT)
Dept: FAMILY MEDICINE | Facility: CLINIC | Age: 58
End: 2023-01-25
Payer: COMMERCIAL

## 2023-01-25 VITALS
HEIGHT: 70 IN | OXYGEN SATURATION: 97 % | WEIGHT: 210.6 LBS | SYSTOLIC BLOOD PRESSURE: 118 MMHG | HEART RATE: 87 BPM | RESPIRATION RATE: 12 BRPM | BODY MASS INDEX: 30.15 KG/M2 | TEMPERATURE: 98.1 F | DIASTOLIC BLOOD PRESSURE: 70 MMHG

## 2023-01-25 DIAGNOSIS — Z01.818 PREOP GENERAL PHYSICAL EXAM: Primary | ICD-10-CM

## 2023-01-25 LAB
ERYTHROCYTE [DISTWIDTH] IN BLOOD BY AUTOMATED COUNT: 14.1 % (ref 10–15)
HBA1C MFR BLD: 6.4 % (ref 0–5.6)
HCT VFR BLD AUTO: 44.2 % (ref 40–53)
HGB BLD-MCNC: 14.6 G/DL (ref 13.3–17.7)
MCH RBC QN AUTO: 29.5 PG (ref 26.5–33)
MCHC RBC AUTO-ENTMCNC: 33 G/DL (ref 31.5–36.5)
MCV RBC AUTO: 89 FL (ref 78–100)
PLATELET # BLD AUTO: 341 10E3/UL (ref 150–450)
RBC # BLD AUTO: 4.95 10E6/UL (ref 4.4–5.9)
WBC # BLD AUTO: 4.6 10E3/UL (ref 4–11)

## 2023-01-25 PROCEDURE — 36415 COLL VENOUS BLD VENIPUNCTURE: CPT | Performed by: PHYSICIAN ASSISTANT

## 2023-01-25 PROCEDURE — 99214 OFFICE O/P EST MOD 30 MIN: CPT | Mod: 25 | Performed by: PHYSICIAN ASSISTANT

## 2023-01-25 PROCEDURE — 90715 TDAP VACCINE 7 YRS/> IM: CPT | Performed by: PHYSICIAN ASSISTANT

## 2023-01-25 PROCEDURE — 83036 HEMOGLOBIN GLYCOSYLATED A1C: CPT | Performed by: PHYSICIAN ASSISTANT

## 2023-01-25 PROCEDURE — 90471 IMMUNIZATION ADMIN: CPT | Performed by: PHYSICIAN ASSISTANT

## 2023-01-25 PROCEDURE — 85027 COMPLETE CBC AUTOMATED: CPT | Performed by: PHYSICIAN ASSISTANT

## 2023-01-25 RX ORDER — SIMVASTATIN 20 MG
20 TABLET ORAL AT BEDTIME
COMMUNITY
End: 2023-02-06

## 2023-01-25 NOTE — PROGRESS NOTES
[facility]  [id number] [place] [id number]  Phone: [phone]  Primary Provider: Cassandra Waller  Pre-op Performing Provider: CASSANDRA WALLER      PREOPERATIVE EVALUATION:  Today's date: 1/25/2023    Willard Manzano is a 57 year old male who presents for a preoperative evaluation.    Surgical Information:  Surgery/Procedure: left thumb CMC arthoplasty ligament reconstruction tendon interposition   Surgery Location: [facility]  Surgeon: Tonya Mantilla   Surgery Date: 1/27/23  Time of Surgery: 9:30  Where patient plans to recover: At home with family  Fax number for surgical facility: [phone]    Type of Anesthesia Anticipated: axillary block with sedation    Assessment & Plan     The proposed surgical procedure is considered INTERMEDIATE risk.    Preop general physical exam    - CBC with platelets; Future  - Hemoglobin A1c; Future  - CBC with platelets  - Hemoglobin A1c           Risks and Recommendations:  The patient has the following additional risks and recommendations for perioperative complications:   - No identified additional risk factors other than previously addressed    Medication Instructions:   - metformin: HOLD day of surgery.    RECOMMENDATION:  APPROVAL GIVEN to proceed with proposed procedure, without further diagnostic evaluation.            Subjective     HPI related to upcoming procedure: left thumb pain    Preop Questions 1/19/2023   1. Have you ever had a heart attack or stroke? No   2. Have you ever had surgery on your heart or blood vessels, such as a stent placement, a coronary artery bypass, or surgery on an artery in your head, neck, heart, or legs? No   3. Do you have chest pain with activity? No   4. Do you have a history of  heart failure? No   5. Do you currently have a cold, bronchitis or symptoms of other infection? No   6. Do you have a cough, shortness of breath, or wheezing? No   7. Do you or anyone in your family have  previous history of blood clots? No   8. Do you or does anyone in your family have a serious bleeding problem such as prolonged bleeding following surgeries or cuts? No   9. Have you ever had problems with anemia or been told to take iron pills? No   10. Have you had any abnormal blood loss such as black, tarry or bloody stools? No   11. Have you ever had a blood transfusion? No   12. Are you willing to have a blood transfusion if it is medically needed before, during, or after your surgery? Yes   13. Have you or any of your relatives ever had problems with anesthesia? No   14. Do you have sleep apnea, excessive snoring or daytime drowsiness? No   15. Do you have any artifical heart valves or other implanted medical devices like a pacemaker, defibrillator, or continuous glucose monitor? No   16. Do you have artificial joints? No   17. Are you allergic to latex? No       Health Care Directive:  Patient does not have a Health Care Directive or Living Will:     Preoperative Review of :   reviewed - no record of controlled substances prescribed.      Status of Chronic Conditions:  See problem list for active medical problems.  Problems all longstanding and stable, except as noted/documented.  See ROS for pertinent symptoms related to these conditions.      Review of Systems  Constitutional, neuro, ENT, endocrine, pulmonary, cardiac, gastrointestinal, genitourinary, musculoskeletal, integument and psychiatric systems are negative, except as otherwise noted.    Patient Active Problem List    Diagnosis Date Noted     Pedal edema 06/10/2021     Priority: Medium     Varicose veins of both lower extremities 06/10/2021     Priority: Medium     Pain in both knees, unspecified chronicity 06/10/2021     Priority: Medium     Prediabetes 11/16/2020     Priority: Medium     Personal history of DVT (deep vein thrombosis) 12/26/2018     Priority: Medium     Benign prostatic hyperplasia with urinary hesitancy 12/30/2015      Priority: Medium     Esophageal reflux 08/20/2014     Priority: Medium     Family history of coronary artery disease 08/15/2013     Priority: Medium     Elevated fasting glucose 01/19/2012     Priority: Medium     Family history of colon cancer 11/23/2010     Priority: Medium     Hyperlipidemia LDL goal <160 10/31/2010     Priority: Medium     Chiari malformation type I (H)      Priority: Medium     Diagnosed in 2007        Past Medical History:   Diagnosis Date     Arthritis 1/2011     Blood clotting disorder (H)      Cancer (H)     Skin     Chiari malformation type I (H)      Elevated fasting glucose 1/19/2012     Headaches, migraine      Headaches, migraine      Other and unspecified hyperlipidemia      Pedal edema 6/10/2021     Rotator cuff tear 1/22/2012     Rotator cuff tear 1/22/2012     Rotator cuff tendinitis 1/2011    Inj. Dr. Cheney.      Spinal headache 2007    spontaneous spinal fluid leak - 2 blood patches     Urethral polyp 6-24-10    Dr. Card     Urethral polyp 6/24/2010    Dr. Card      Varicose veins of both lower extremities 6/10/2021     Past Surgical History:   Procedure Laterality Date     ABDOMEN SURGERY  2006    Umbilical Hernia repair     BIOPSY      arms cancer, 2 spots     COLONOSCOPY  12/29/10    non-adenomatous polyp. Advised 5 yr f/u.      COLONOSCOPY N/A 2/19/2021    Procedure: COLONOSCOPY;  Surgeon: Broderick Rivera MD;  Location: [facility]     HC REMOVAL OF TONSILS,<13 Y/O       HERNIA REPAIR  2006     ORTHOPEDIC SURGERY  2018    Rotator Cuff Repair     SURGICAL HISTORY OF -   2/2006    umbilical hernia repair with mesh     ZZHC COLONOSCOPY THRU STOMA, DIAGNOSTIC  6/26/08    normal. 3 yr f/u due to brother CA age 43.     ZZHC VASECTOMY UNILAT/BILAT W POSTOP SEMEN       Current Outpatient Medications   Medication Sig Dispense Refill     aspirin 81 MG EC tablet Take 81 mg by mouth daily       fenofibrate (TRICOR) 145 MG tablet Take 1 tablet (145 mg) by mouth daily 90 tablet 3      metFORMIN (GLUCOPHAGE XR) 500 MG 24 hr tablet TAKE 1 TABLET(500 MG) BY MOUTH DAILY WITH DINNER Strength: 500 mg 90 tablet 3     Omega-3 Fatty Acids (FISH OIL PO)        omeprazole (PRILOSEC) 40 MG DR capsule TAKE 1 CAPSULE(40 MG) BY MOUTH DAILY 30 TO 60 MINUTES BEFORE A MEAL 90 capsule 3     simvastatin (ZOCOR) 20 MG tablet Take 20 mg by mouth At Bedtime       UNABLE TO FIND MEDICATION NAME: Equal line for allergies       loratadine (CLARITIN) 10 MG tablet Take 10 mg by mouth daily (Patient not taking: Reported on 2023)       rosuvastatin (CRESTOR) 20 MG tablet Take 1 tablet (20 mg) by mouth daily (Patient not taking: Reported on 2023) 30 tablet 11     tamsulosin (FLOMAX) 0.4 MG capsule Take 1 capsule (0.4 mg) by mouth daily (Patient not taking: Reported on 2023) 90 capsule 1     traZODone (DESYREL) 50 MG tablet Take 1 tablet (50 mg) by mouth nightly as needed for sleep (Patient not taking: Reported on 2023) 90 tablet 1       Allergies   Allergen Reactions     Mold Shortness Of Breath     Cats      Congestion and drainage     Grass Cough     No Known Allergies         Social History     Tobacco Use     Smoking status: Never     Smokeless tobacco: Never     Tobacco comments:     Some sporadic use 40+ years ago- cigars   Substance Use Topics     Alcohol use: Yes     Comment: 1-2 drinks a week     Family History   Problem Relation Age of Onset     Diabetes Mother         dx in her 40's. overwt. Now IDDM.     Hypertension Mother      Lipids Mother      Gallbladder Disease Mother      Cancer Father          lung CA with brain mets age 65     Other Cancer Father         Lung     Substance Abuse Father         Alcoholism     Other Cancer Brother         Lung     Cardiovascular Brother         Loi. Brother MI at age 49. Had another stent at age 52     Diabetes Sister      Family History Negative Sister      Allergies Son      Substance Abuse Son      Family History Negative Son      Family History  "Negative Daughter      Breast Cancer Sister         Dx bilateral breast CA at age 56.      Other Cancer Sister         Lung     Breast Cancer Sister         (pronounced Muh-jay)     Colon Cancer Brother         Diagnosed at age 40s     Other Cancer Brother         Lung     Substance Abuse Son         Alcoholism     History   Drug Use No         Objective     /70 (BP Location: Right arm, Patient Position: Sitting, Cuff Size: Adult Regular)   Pulse 87   Temp 98.1  F (36.7  C) (Oral)   Resp 12   Ht 1.778 m (5' 10\")   Wt 95.5 kg (210 lb 9.6 oz)   SpO2 97%   BMI 30.22 kg/m      Physical Exam    GENERAL APPEARANCE: healthy, alert and no distress     EYES: EOMI,  PERRL     HENT: ear canals and TM's normal and nose and mouth without ulcers or lesions     NECK: no adenopathy, no asymmetry, masses, or scars and thyroid normal to palpation     RESP: lungs clear to auscultation - no rales, rhonchi or wheezes     CV: regular rates and rhythm, normal S1 S2, no S3 or S4 and no murmur, click or rub     ABDOMEN:  soft, nontender, no HSM or masses and bowel sounds normal     SKIN: no suspicious lesions or rashes     NEURO: Normal strength and tone, sensory exam grossly normal, mentation intact and speech normal     PSYCH: mentation appears normal. and affect normal/bright     LYMPHATICS: No cervical adenopathy    Recent Labs   Lab Test 05/18/22  0821 02/18/22  0924   HGB  --  16.6   PLT  --  337   A1C 5.9* 6.4*        Diagnostics:  Recent Results (from the past 24 hour(s))   CBC with platelets    Collection Time: 01/25/23  3:56 PM   Result Value Ref Range    WBC Count 4.6 4.0 - 11.0 10e3/uL    RBC Count 4.95 4.40 - 5.90 10e6/uL    Hemoglobin 14.6 13.3 - 17.7 g/dL    Hematocrit 44.2 40.0 - 53.0 %    MCV 89 78 - 100 fL    MCH 29.5 26.5 - 33.0 pg    MCHC 33.0 31.5 - 36.5 g/dL    RDW 14.1 10.0 - 15.0 %    Platelet Count 341 150 - 450 10e3/uL   Hemoglobin A1c    Collection Time: 01/25/23  3:56 PM   Result Value Ref Range    " Hemoglobin A1C 6.4 (H) 0.0 - 5.6 %      No EKG required, no history of coronary heart disease, significant arrhythmia, peripheral arterial disease or other structural heart disease.    Revised Cardiac Risk Index (RCRI):  The patient has the following serious cardiovascular risks for perioperative complications:   - No serious cardiac risks = 0 points     RCRI Interpretation: 0 points: Class I (very low risk - 0.4% complication rate)         Signed Electronically by: Cassandra Villafana PA-C  Copy of this evaluation report is provided to requesting physician.

## 2023-01-25 NOTE — TELEPHONE ENCOUNTER
Dr. Mantilla called back and informed that patient is having left thumb CMC arthoplasty ligament reconstruction tendon interposition. Anesthesia is going to be Axillary block with sedation.

## 2023-01-25 NOTE — TELEPHONE ENCOUNTER
Patient here for preop with Cassandra.  Surgery is with Dr. Mantilla at [facility].  Need to know what surgery he is having and what type of anesthesia.  Called [facility] at [phone].  Got transferred to care coordinator for Dr. Mallorie Rai.  Went to V/M.  L/M to call [facility] phone # with what surgery he is having and what anesthesia.  Adeline Still RN

## 2023-01-26 ENCOUNTER — MYC MEDICAL ADVICE (OUTPATIENT)
Dept: FAMILY MEDICINE | Facility: CLINIC | Age: 58
End: 2023-01-26
Payer: COMMERCIAL

## 2023-02-06 ENCOUNTER — OFFICE VISIT (OUTPATIENT)
Dept: CARDIOLOGY | Facility: CLINIC | Age: 58
End: 2023-02-06
Attending: NURSE PRACTITIONER
Payer: COMMERCIAL

## 2023-02-06 VITALS
WEIGHT: 206.6 LBS | OXYGEN SATURATION: 97 % | HEIGHT: 61 IN | DIASTOLIC BLOOD PRESSURE: 81 MMHG | BODY MASS INDEX: 39.01 KG/M2 | SYSTOLIC BLOOD PRESSURE: 133 MMHG | HEART RATE: 85 BPM | RESPIRATION RATE: 17 BRPM

## 2023-02-06 DIAGNOSIS — R93.1 ELEVATED CORONARY ARTERY CALCIUM SCORE: ICD-10-CM

## 2023-02-06 DIAGNOSIS — R91.8 PULMONARY NODULES: Primary | ICD-10-CM

## 2023-02-06 DIAGNOSIS — E78.5 HYPERLIPIDEMIA LDL GOAL <70: ICD-10-CM

## 2023-02-06 PROCEDURE — 99214 OFFICE O/P EST MOD 30 MIN: CPT | Performed by: NURSE PRACTITIONER

## 2023-02-06 RX ORDER — ROSUVASTATIN CALCIUM 20 MG/1
20 TABLET, COATED ORAL DAILY
Qty: 30 TABLET | Refills: 11 | Status: SHIPPED | OUTPATIENT
Start: 2023-02-06 | End: 2023-11-30

## 2023-02-06 NOTE — PATIENT INSTRUCTIONS
_______________________________________     Thank you for your visit with the [facility] today.    Today's plan:   Repeat chest CT in the summer, then follow up with Dr. Cox for your yearly visit.    If you have questions or concerns, please do not hesitate to call my nursing support team at [phone].    Scheduling phone number: [phone]  [facility] Clinic Number: [phone]    It was a pleasure seeing you today.     ANDREW Dee, CNP  Nurse Practitioner  [facility]  February 6, 2023  ________________________________________________________

## 2023-02-06 NOTE — PROGRESS NOTES
~Cardiology Clinic Visit~    Primary Cardiologist: Dr. Cox  Last Office Visit: 11/7/2022  Reason for visit: Medication change follow up    He is a very pleasant 57 year old male with a past medical history notable for hyperlipidemia and mildly elevated CT coronary calcium score. He recently met with Dr. Cecilia Cox in cardiology consultation last month for evaluation of shortness of breath with exertion and occasional nagging left-sided chest pressure, both at rest and with stress with no radiation.      He has a family history of coronary artery disease in one of his brothers who suffered a heart attack in his 50s and 60s.  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     He underwent CT coronary calcium scoring on 5/5/2022 showing a total Agatston calcium score of 22.1, concentrated in the left anterior descending artery.  Incidentally noted were some small pulmonary nodules that were < 6 mm.     For further evaluation of his chest pressure and reassessment of the small pulmonary nodules given significant secondhand smoke exposure, Dr. Cox recommended an exercise nuclear stress test, echocardiogram, and repeat CT chest in about 12 months.     The exercise nuclear stress test was completed on 5/20/2022 showing average exercise capacity, 3/10 baseline chest pain was noted at the time of the test that did not change with exercise. No ischemic EKG changes were noted. The nuclear stress test was negative for inducible myocardial ischemia or infarction. The left ventricular ejection fraction at stress is greater than 70%. No changes when directly compared to nuclear stress from 2015.    Patient had no new cardiac symptoms at our last visit together, he was feeling well aside to having a sinus infection.  He remains active by walking his dog 3-4 times a week and has no associated  shortness of breath with activity.  At that visit, he was recommended to change from simvastatin to rosuvastatin for improved cholesterol management.     Interval 02/06/23    Patient is doing quite well from a cardiac standpoint.  Has no new concerning symptoms.  He notes that he is taking rosuvastatin as ordered.  We reviewed his most recent lipid panel from 11/20/2022 with great improvement -, HDL 37, LDL 82, .  He also recently underwent a left thumb surgery (Left Thumb CMC Arthroplasty ligament reconstruction Tendon interposition), and is healing well from that standpoint.  He continues to remain active.     Impression & Plan    Mildly elevated coronary artery calcium score  Exertional dyspnea and chest pain with typical and atypical features  Family history of CAD in his brother - soon to have double bypass surgery.  -No anginal symptoms.  -Recent echocardiogram demonstrated normal EF function 55-60%, no acute abnormalities.  -Is compliant with his medications.  -Currently on aspirin, rosuvastatin.     Hyperlipidemia, LDL goal <70  - Most recent lipid panel 11/6/22: , HDL 24, , .  -On rosuvastatin.     Small pulmonary nodules with history of significant secondhand smoke exposure  -Remote monitoring; Follow with yearly CT for trends.  __________________________________________________________________    Plan:  1. Continue current cardiac medications without changes.  2. Will repeat chest CT with contrast this summer part of serial monitoring.  3. We will see Dr. Cox in clinic this summer as part of annual visit.  __________________________________________________________________    Thank you for the opportunity to participate in this pleasant patient's care.    We would be happy to see this patient sooner for any concerns in the meantime.    ANDREW Dee, CNP   Nurse Practitioner  [facility]    Today's clinic visit entailed:  Review of the  result(s) of each unique test - CT, labs, surgical notes, echo, stress test.   Ordering of each unique test  Prescription drug management    The level of medical decision making during this visit was of moderate complexity.    Orders this Visit:  Orders Placed This Encounter   Procedures     CT Chest w contrast*     Follow-Up with Cardiology     Orders Placed This Encounter   Medications     rosuvastatin (CRESTOR) 20 MG tablet     Sig: Take 1 tablet (20 mg) by mouth daily     Dispense:  30 tablet     Refill:  11     Medications Discontinued During This Encounter   Medication Reason     simvastatin (ZOCOR) 20 MG tablet      UNABLE TO FIND      rosuvastatin (CRESTOR) 20 MG tablet Reorder (No AVS / No eCancel)     Encounter Diagnoses   Name Primary?     Hyperlipidemia LDL goal <70      Elevated coronary artery calcium score      Pulmonary nodules Yes     CURRENT MEDICATIONS:  Current Outpatient Medications   Medication Sig Dispense Refill     aspirin 81 MG EC tablet Take 81 mg by mouth daily       fenofibrate (TRICOR) 145 MG tablet Take 1 tablet (145 mg) by mouth daily 90 tablet 3     loratadine (CLARITIN) 10 MG tablet Take 10 mg by mouth daily PRN       metFORMIN (GLUCOPHAGE XR) 500 MG 24 hr tablet TAKE 1 TABLET(500 MG) BY MOUTH DAILY WITH DINNER Strength: 500 mg 90 tablet 3     Omega-3 Fatty Acids (FISH OIL PO)        omeprazole (PRILOSEC) 40 MG DR capsule TAKE 1 CAPSULE(40 MG) BY MOUTH DAILY 30 TO 60 MINUTES BEFORE A MEAL 90 capsule 3     rosuvastatin (CRESTOR) 20 MG tablet Take 1 tablet (20 mg) by mouth daily 30 tablet 11     tamsulosin (FLOMAX) 0.4 MG capsule Take 1 capsule (0.4 mg) by mouth daily 90 capsule 1     traZODone (DESYREL) 50 MG tablet Take 1 tablet (50 mg) by mouth nightly as needed for sleep 90 tablet 1     ALLERGIES     Allergies   Allergen Reactions     Mold Shortness Of Breath     Cats      Congestion and drainage     Grass Cough     No Known Allergies      PAST MEDICAL, SURGICAL, FAMILY, SOCIAL  "HISTORY:  History was reviewed and updated as needed, see medical record.    Review of Systems:  Review Of Systems  Skin: negative  Eyes: negative  Ears/Nose/Throat: negative  Respiratory: No shortness of breath, dyspnea on exertion, cough, or hemoptysis  Cardiovascular: negative, chest pain, exertional chest pain or pressure and dyspnea on exertion  Gastrointestinal: negative  Genitourinary: negative  Musculoskeletal: negative  Neurologic: negative  Psychiatric: negative  Hematologic/Lymphatic/Immunologic: negative  Endocrine: negative     Physical Exam:    Vitals: /81   Pulse 85   Resp 17   Ht 1.554 m (5' 1.2\")   Wt 93.7 kg (206 lb 9.6 oz)   SpO2 97%   BMI 38.78 kg/m    Constitutional: Appears stated age, well nourished, NAD.  Eyes: Pupils equal, round. Sclerae anicteric.   HEENT: Normocephalic, atraumatic.   Neck: Supple. Carotid pulses full and equal. No carotid bruit.  No JVD appreciated.  Respiratory: Non-labored. Lungs CTAB.  Cardiovascular: RRR, normal S1 and S2. No M/G/R.  No edema.  GI: Soft, non-distended, non-tender.  Skin: Warm and dry. No rashes, cyanosis, edema.  Musculoskeletal/Extremities: Symmetrical movement to all extremities. .  Neurologic: No gross focal deficits. Alert, awake, and oriented to all spheres.  Psychiatric: Affect appropriate. Mentation normal.    Recent Lab Results:  LIPID RESULTS:  Lab Results   Component Value Date    CHOL 142 11/20/2022    CHOL 216 (H) 12/07/2020    HDL 37 (L) 11/20/2022    HDL 42 12/07/2020    LDL 82 11/20/2022     (H) 12/07/2020    TRIG 115 11/20/2022    TRIG 92 12/07/2020    CHOLHDLRATIO 4.7 08/20/2014     LIVER ENZYME RESULTS:  Lab Results   Component Value Date    AST 22 11/06/2022    AST 25 12/07/2020    ALT 35 11/06/2022    ALT 40 12/07/2020     CBC RESULTS:  Lab Results   Component Value Date    WBC 4.6 01/25/2023    WBC 5.2 12/07/2020    RBC 4.95 01/25/2023    RBC 5.34 12/07/2020    HGB 14.6 01/25/2023    HGB 15.8 12/07/2020    HCT " 44.2 01/25/2023    HCT 46.5 12/07/2020    MCV 89 01/25/2023    MCV 87 12/07/2020    MCH 29.5 01/25/2023    MCH 29.6 12/07/2020    MCHC 33.0 01/25/2023    MCHC 34.0 12/07/2020    RDW 14.1 01/25/2023    RDW 13.4 12/07/2020     01/25/2023     12/07/2020     BMP RESULTS:  Lab Results   Component Value Date     12/07/2020    POTASSIUM 4.1 12/07/2020    CHLORIDE 104 12/07/2020    CO2 25 12/07/2020    ANIONGAP 6 12/07/2020    GLC 89 12/07/2020    BUN 23 12/07/2020    CR 1.12 12/07/2020    GFRESTIMATED 72 12/07/2020    GFRESTBLACK 84 12/07/2020    MAR 9.1 12/07/2020      A1C RESULTS:  Lab Results   Component Value Date    A1C 6.4 (H) 01/25/2023    A1C 6.1 (H) 12/07/2020     INR RESULTS:  Lab Results   Component Value Date    INR 1.05 06/14/2018    INR 1.03 06/07/2007

## 2023-02-06 NOTE — LETTER
2/6/2023    Cassandra Villafana PA-C  [address] [facility] [address]    RE: Willard Manzano       Dear Colleague,     I had the pleasure of seeing Willard Manzano in the [facility].              ~Cardiology Clinic Visit~    Primary Cardiologist: Dr. Cox  Last Office Visit: 11/7/2022  Reason for visit: Medication change follow up    He is a very pleasant 57 year old male with a past medical history notable for hyperlipidemia and mildly elevated CT coronary calcium score. He recently met with Dr. Cecilia Cox in cardiology consultation last month for evaluation of shortness of breath with exertion and occasional nagging left-sided chest pressure, both at rest and with stress with no radiation.      He has a family history of coronary artery disease in one of his brothers who suffered a heart attack in his 50s and 60s.  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     He underwent CT coronary calcium scoring on 5/5/2022 showing a total Agatston calcium score of 22.1, concentrated in the left anterior descending artery.  Incidentally noted were some small pulmonary nodules that were < 6 mm.     For further evaluation of his chest pressure and reassessment of the small pulmonary nodules given significant secondhand smoke exposure, Dr. Cox recommended an exercise nuclear stress test, echocardiogram, and repeat CT chest in about 12 months.     The exercise nuclear stress test was completed on 5/20/2022 showing average exercise capacity, 3/10 baseline chest pain was noted at the time of the test that did not change with exercise. No ischemic EKG changes were noted. The nuclear stress test was negative for inducible myocardial ischemia or infarction. The left ventricular ejection fraction at stress is greater than 70%. No changes when directly compared to nuclear stress from  2015.    Patient had no new cardiac symptoms at our last visit together, he was feeling well aside to having a sinus infection.  He remains active by walking his dog 3-4 times a week and has no associated shortness of breath with activity.  At that visit, he was recommended to change from simvastatin to rosuvastatin for improved cholesterol management.     Interval 02/06/23    Patient is doing quite well from a cardiac standpoint.  Has no new concerning symptoms.  He notes that he is taking rosuvastatin as ordered.  We reviewed his most recent lipid panel from 11/20/2022 with great improvement -, HDL 37, LDL 82, .  He also recently underwent a left thumb surgery (Left Thumb CMC Arthroplasty ligament reconstruction Tendon interposition), and is healing well from that standpoint.  He continues to remain active.     Impression & Plan    Mildly elevated coronary artery calcium score  Exertional dyspnea and chest pain with typical and atypical features  Family history of CAD in his brother - soon to have double bypass surgery.  -No anginal symptoms.  -Recent echocardiogram demonstrated normal EF function 55-60%, no acute abnormalities.  -Is compliant with his medications.  -Currently on aspirin, rosuvastatin.     Hyperlipidemia, LDL goal <70  - Most recent lipid panel 11/6/22: , HDL 24, , .  -On rosuvastatin.     Small pulmonary nodules with history of significant secondhand smoke exposure  -Remote monitoring; Follow with yearly CT for trends.  __________________________________________________________________    Plan:  1. Continue current cardiac medications without changes.  2. Will repeat chest CT with contrast this summer part of serial monitoring.  3. We will see Dr. Cox in clinic this summer as part of annual visit.  __________________________________________________________________    Thank you for the opportunity to participate in this pleasant patient's care.    We would be  happy to see this patient sooner for any concerns in the meantime.    ANDREW Dee, CNP   Nurse Practitioner  [facility]    Today's clinic visit entailed:  Review of the result(s) of each unique test - CT, labs, surgical notes, echo, stress test.   Ordering of each unique test  Prescription drug management    The level of medical decision making during this visit was of moderate complexity.    Orders this Visit:  Orders Placed This Encounter   Procedures     CT Chest w contrast*     Follow-Up with Cardiology     Orders Placed This Encounter   Medications     rosuvastatin (CRESTOR) 20 MG tablet     Sig: Take 1 tablet (20 mg) by mouth daily     Dispense:  30 tablet     Refill:  11     Medications Discontinued During This Encounter   Medication Reason     simvastatin (ZOCOR) 20 MG tablet      UNABLE TO FIND      rosuvastatin (CRESTOR) 20 MG tablet Reorder (No AVS / No eCancel)     Encounter Diagnoses   Name Primary?     Hyperlipidemia LDL goal <70      Elevated coronary artery calcium score      Pulmonary nodules Yes     CURRENT MEDICATIONS:  Current Outpatient Medications   Medication Sig Dispense Refill     aspirin 81 MG EC tablet Take 81 mg by mouth daily       fenofibrate (TRICOR) 145 MG tablet Take 1 tablet (145 mg) by mouth daily 90 tablet 3     loratadine (CLARITIN) 10 MG tablet Take 10 mg by mouth daily PRN       metFORMIN (GLUCOPHAGE XR) 500 MG 24 hr tablet TAKE 1 TABLET(500 MG) BY MOUTH DAILY WITH DINNER Strength: 500 mg 90 tablet 3     Omega-3 Fatty Acids (FISH OIL PO)        omeprazole (PRILOSEC) 40 MG DR capsule TAKE 1 CAPSULE(40 MG) BY MOUTH DAILY 30 TO 60 MINUTES BEFORE A MEAL 90 capsule 3     rosuvastatin (CRESTOR) 20 MG tablet Take 1 tablet (20 mg) by mouth daily 30 tablet 11     tamsulosin (FLOMAX) 0.4 MG capsule Take 1 capsule (0.4 mg) by mouth daily 90 capsule 1     traZODone (DESYREL) 50 MG tablet Take 1 tablet (50 mg) by mouth nightly as needed for sleep 90 tablet 1  "    ALLERGIES     Allergies   Allergen Reactions     Mold Shortness Of Breath     Cats      Congestion and drainage     Grass Cough     No Known Allergies      PAST MEDICAL, SURGICAL, FAMILY, SOCIAL HISTORY:  History was reviewed and updated as needed, see medical record.    Review of Systems:  Review Of Systems  Skin: negative  Eyes: negative  Ears/Nose/Throat: negative  Respiratory: No shortness of breath, dyspnea on exertion, cough, or hemoptysis  Cardiovascular: negative, chest pain, exertional chest pain or pressure and dyspnea on exertion  Gastrointestinal: negative  Genitourinary: negative  Musculoskeletal: negative  Neurologic: negative  Psychiatric: negative  Hematologic/Lymphatic/Immunologic: negative  Endocrine: negative     Physical Exam:    Vitals: /81   Pulse 85   Resp 17   Ht 1.554 m (5' 1.2\")   Wt 93.7 kg (206 lb 9.6 oz)   SpO2 97%   BMI 38.78 kg/m    Constitutional: Appears stated age, well nourished, NAD.  Eyes: Pupils equal, round. Sclerae anicteric.   HEENT: Normocephalic, atraumatic.   Neck: Supple. Carotid pulses full and equal. No carotid bruit.  No JVD appreciated.  Respiratory: Non-labored. Lungs CTAB.  Cardiovascular: RRR, normal S1 and S2. No M/G/R.  No edema.  GI: Soft, non-distended, non-tender.  Skin: Warm and dry. No rashes, cyanosis, edema.  Musculoskeletal/Extremities: Symmetrical movement to all extremities. .  Neurologic: No gross focal deficits. Alert, awake, and oriented to all spheres.  Psychiatric: Affect appropriate. Mentation normal.    Recent Lab Results:  LIPID RESULTS:  Lab Results   Component Value Date    CHOL 142 11/20/2022    CHOL 216 (H) 12/07/2020    HDL 37 (L) 11/20/2022    HDL 42 12/07/2020    LDL 82 11/20/2022     (H) 12/07/2020    TRIG 115 11/20/2022    TRIG 92 12/07/2020    CHOLHDLRATIO 4.7 08/20/2014     LIVER ENZYME RESULTS:  Lab Results   Component Value Date    AST 22 11/06/2022    AST 25 12/07/2020    ALT 35 11/06/2022    ALT 40 " 12/07/2020     CBC RESULTS:  Lab Results   Component Value Date    WBC 4.6 01/25/2023    WBC 5.2 12/07/2020    RBC 4.95 01/25/2023    RBC 5.34 12/07/2020    HGB 14.6 01/25/2023    HGB 15.8 12/07/2020    HCT 44.2 01/25/2023    HCT 46.5 12/07/2020    MCV 89 01/25/2023    MCV 87 12/07/2020    MCH 29.5 01/25/2023    MCH 29.6 12/07/2020    MCHC 33.0 01/25/2023    MCHC 34.0 12/07/2020    RDW 14.1 01/25/2023    RDW 13.4 12/07/2020     01/25/2023     12/07/2020     BMP RESULTS:  Lab Results   Component Value Date     12/07/2020    POTASSIUM 4.1 12/07/2020    CHLORIDE 104 12/07/2020    CO2 25 12/07/2020    ANIONGAP 6 12/07/2020    GLC 89 12/07/2020    BUN 23 12/07/2020    CR 1.12 12/07/2020    GFRESTIMATED 72 12/07/2020    GFRESTBLACK 84 12/07/2020    MAR 9.1 12/07/2020      A1C RESULTS:  Lab Results   Component Value Date    A1C 6.4 (H) 01/25/2023    A1C 6.1 (H) 12/07/2020     INR RESULTS:  Lab Results   Component Value Date    INR 1.05 06/14/2018    INR 1.03 06/07/2007       Thank you for allowing me to participate in the care of your patient.      Sincerely,     ANDREW Dee CNP     [facility]  cc:   ANDREW Dee CNP  [address]  [place] [address]

## 2023-02-09 ENCOUNTER — TRANSFERRED RECORDS (OUTPATIENT)
Dept: HEALTH INFORMATION MANAGEMENT | Facility: CLINIC | Age: 58
End: 2023-02-09
Payer: COMMERCIAL

## 2023-03-02 ENCOUNTER — TRANSFERRED RECORDS (OUTPATIENT)
Dept: HEALTH INFORMATION MANAGEMENT | Facility: CLINIC | Age: 58
End: 2023-03-02
Payer: COMMERCIAL

## 2023-03-16 DIAGNOSIS — K21.00 GASTROESOPHAGEAL REFLUX DISEASE WITH ESOPHAGITIS WITHOUT HEMORRHAGE: ICD-10-CM

## 2023-03-17 RX ORDER — OMEPRAZOLE 40 MG/1
CAPSULE, DELAYED RELEASE ORAL
Qty: 90 CAPSULE | Refills: 2 | Status: SHIPPED | OUTPATIENT
Start: 2023-03-17 | End: 2023-12-13

## 2023-04-06 ENCOUNTER — TRANSFERRED RECORDS (OUTPATIENT)
Dept: HEALTH INFORMATION MANAGEMENT | Facility: CLINIC | Age: 58
End: 2023-04-06
Payer: COMMERCIAL

## 2023-05-25 ENCOUNTER — TRANSFERRED RECORDS (OUTPATIENT)
Dept: HEALTH INFORMATION MANAGEMENT | Facility: CLINIC | Age: 58
End: 2023-05-25
Payer: COMMERCIAL

## 2023-06-06 ENCOUNTER — HOSPITAL ENCOUNTER (OUTPATIENT)
Dept: CT IMAGING | Facility: CLINIC | Age: 58
Discharge: HOME | End: 2023-06-06
Attending: NURSE PRACTITIONER | Admitting: NURSE PRACTITIONER
Payer: COMMERCIAL

## 2023-06-06 DIAGNOSIS — R91.8 PULMONARY NODULES: ICD-10-CM

## 2023-06-06 PROCEDURE — 71250 CT THORAX DX C-: CPT

## 2023-06-13 ENCOUNTER — TRANSFERRED RECORDS (OUTPATIENT)
Dept: HEALTH INFORMATION MANAGEMENT | Facility: CLINIC | Age: 58
End: 2023-06-13
Payer: COMMERCIAL

## 2023-06-13 LAB — RETINOPATHY: NEGATIVE

## 2023-06-15 ENCOUNTER — TELEPHONE (OUTPATIENT)
Dept: FAMILY MEDICINE | Facility: CLINIC | Age: 58
End: 2023-06-15
Payer: COMMERCIAL

## 2023-06-15 NOTE — TELEPHONE ENCOUNTER
Patient Quality Outreach    Patient is due for the following:   Diabetes -  Eye Exam    Next Steps:   No follow up needed at this time.    Type of outreach:    Chart review performed, no outreach needed. and Pt had a diabetic eye exam at [facility] on 6/13/23      Questions for provider review:    None           Malinda Tian, [facility]

## 2023-06-21 NOTE — PATIENT INSTRUCTIONS
Apply warm compresses to the area intermittently      Take tylenol and/or ibuprofen (whichever you are allowed to take) for the discomfort every 4-6 hours as needed      If symptoms worsen return to be evaluated      Appointment with your doctor's office if symptoms haven't improved by early next week   Paramedian Forehead Flap Text: A decision was made to reconstruct the defect utilizing an interpolation axial flap and a staged reconstruction.  A telfa template was made of the defect.  This telfa template was then used to outline the paramedian forehead pedicle flap.  The donor area for the pedicle flap was then injected with anesthesia.  The flap was excised through the skin and subcutaneous tissue down to the layer of the underlying musculature.  The pedicle flap was carefully excised within this deep plane to maintain its blood supply.  The edges of the donor site were undermined.   The donor site was closed in a primary fashion.  The pedicle was then rotated into position and sutured.  Once the tube was sutured into place, adequate blood supply was confirmed with blanching and refill.  The pedicle was then wrapped with xeroform gauze and dressed appropriately with a telfa and gauze bandage to ensure continued blood supply and protect the attached pedicle.

## 2023-07-01 DIAGNOSIS — G47.09 OTHER INSOMNIA: ICD-10-CM

## 2023-07-03 RX ORDER — TRAZODONE HYDROCHLORIDE 50 MG/1
TABLET, FILM COATED ORAL
Qty: 90 TABLET | Refills: 1 | Status: SHIPPED | OUTPATIENT
Start: 2023-07-03 | End: 2023-12-13

## 2023-07-03 NOTE — TELEPHONE ENCOUNTER
Prescription approved per [facility] Refill Protocol.  Van MONDRAGON RN 7/3/2023 at 2:50 PM

## 2023-08-07 DIAGNOSIS — N40.1 BENIGN PROSTATIC HYPERPLASIA WITH URINARY HESITANCY: ICD-10-CM

## 2023-08-07 DIAGNOSIS — R39.11 BENIGN PROSTATIC HYPERPLASIA WITH URINARY HESITANCY: ICD-10-CM

## 2023-08-08 RX ORDER — TAMSULOSIN HYDROCHLORIDE 0.4 MG/1
0.4 CAPSULE ORAL DAILY
Qty: 90 CAPSULE | Refills: 0 | Status: SHIPPED | OUTPATIENT
Start: 2023-08-08 | End: 2023-11-30

## 2023-08-08 NOTE — TELEPHONE ENCOUNTER
Prescription approved per [facility] Refill Protocol.    Leigh Muhammad, Registered Nurse  [facility]

## 2023-09-28 ENCOUNTER — OFFICE VISIT (OUTPATIENT)
Dept: URGENT CARE | Facility: URGENT CARE | Age: 58
End: 2023-09-28
Payer: COMMERCIAL

## 2023-09-28 VITALS
DIASTOLIC BLOOD PRESSURE: 86 MMHG | WEIGHT: 200 LBS | HEART RATE: 77 BPM | SYSTOLIC BLOOD PRESSURE: 130 MMHG | TEMPERATURE: 98.3 F | OXYGEN SATURATION: 98 % | BODY MASS INDEX: 37.54 KG/M2
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
## 2023-09-28 DIAGNOSIS — T78.40XA ALLERGIC REACTION, INITIAL ENCOUNTER: ICD-10-CM

## 2023-09-28 DIAGNOSIS — R73.01 ELEVATED FASTING GLUCOSE: ICD-10-CM

## 2023-09-28 PROCEDURE — 99213 OFFICE O/P EST LOW 20 MIN: CPT | Performed by: PHYSICIAN ASSISTANT

## 2023-09-28 RX ORDER — TRIAMCINOLONE ACETONIDE 1 MG/G
OINTMENT TOPICAL 2 TIMES DAILY
Qty: 45 G | Refills: 0 | Status: SHIPPED | OUTPATIENT
Start: 2023-09-28 | End: 2024-01-08

## 2023-09-28 RX ORDER — CEPHALEXIN 500 MG/1
500 CAPSULE ORAL 3 TIMES DAILY
Qty: 30 CAPSULE | Refills: 0 | Status: SHIPPED | OUTPATIENT
Start: 2023-09-28 | End: 2023-10-08

## 2023-09-28 ASSESSMENT — PAIN SCALES - GENERAL: PAINLEVEL: MILD PAIN (2)

## 2023-09-28 NOTE — PROGRESS NOTES
"  Assessment & Plan     Bug bite with infection, initial encounter    Bug bite appears to be locally infected with inflammation     Cellulitis is an infection of the deep layers of skin. A break in the skin, such as a cut or scratch, can let bacteria under the skin. If the bacteria get to deep layers of the skin, it can be serious. If not treated, cellulitis can get into the bloodstream and lymph nodes. The infection can then spread throughout the body. This causes serious illness.   Cellulitis causes the affected skin to become red, swollen, warm, and sore. The reddened areas have a visible border. An open sore may leak fluid (pus). You may have a fever, chills, and pain.   Cellulitis is treated with antibiotics taken for 7 to 10 days. An open sore may be cleaned and covered with cool wet gauze. Symptoms should get better 1 to 2 days after treatment is started. Make sure to take all the antibiotics for the full number of days until they are gone. Keep taking the medicine even if your symptoms go away.     Start on medication:  - cephALEXin (KEFLEX) 500 MG capsule  Dispense: 30 capsule; Refill: 0    Allergic reaction, initial encounter    Patient had allergic reaction to bee sting   Area is not healing and still have inflammation and irritation around wound  - triamcinolone (KENALOG) 0.1 % external ointment  Dispense: 45 g; Refill: 0    Elevated fasting glucose    Will avoid oral prednisone at this time due to diabetes      Review of external notes as documented elsewhere in note       BMI:   Estimated body mass index is 37.54 kg/m  as calculated from the following:    Height as of 2/6/23: 1.554 m (5' 1.2\").    Weight as of this encounter: 90.7 kg (200 lb).     At today's visit with Willard Manzano , we discussed results, diagnosis, medications and formulated a plan.  We also discussed red flags for immediate return to clinic/ER, as well as indications for follow up with PCP if not improved in 3 days. Patient " understood and agreed to plan. Willard Manzano was discharged with stable vitals and has no further questions.       No follow-ups on file.    Dez Prince, [facility], PA-C  [facility]    Guanako Lamar is a 58 year old, presenting for the following health issues:  Urgent Care (Pt states that he has a bug bite on stomach--got stung 7 times over Labor Day weekend.)      HPI     Review of Systems   Constitutional, HEENT, cardiovascular, pulmonary, gi and gu systems are negative, except as otherwise noted.      Objective    /86 (BP Location: Right arm, Patient Position: Sitting, Cuff Size: Adult Regular)   Pulse 77   Temp 98.3  F (36.8  C) (Oral)   Wt 90.7 kg (200 lb)   SpO2 98%   BMI 37.54 kg/m    Body mass index is 37.54 kg/m .  Physical Exam   GENERAL: healthy, alert and no distress  ABDOMEN: Positive for anterior abdomen rash  SKIN: Positive for macular rash, localized rash  NEURO: Normal strength and tone, mentation intact and speech normal  PSYCH: mentation appears normal, affect normal/bright

## 2023-11-30 ENCOUNTER — MYC REFILL (OUTPATIENT)
Dept: FAMILY MEDICINE | Facility: CLINIC | Age: 58
End: 2023-11-30
Payer: COMMERCIAL

## 2023-11-30 ENCOUNTER — MYC REFILL (OUTPATIENT)
Dept: CARDIOLOGY | Facility: CLINIC | Age: 58
End: 2023-11-30
Payer: COMMERCIAL

## 2023-11-30 DIAGNOSIS — E78.5 HYPERLIPIDEMIA LDL GOAL <70: ICD-10-CM

## 2023-11-30 DIAGNOSIS — R39.11 BENIGN PROSTATIC HYPERPLASIA WITH URINARY HESITANCY: ICD-10-CM

## 2023-11-30 DIAGNOSIS — N40.1 BENIGN PROSTATIC HYPERPLASIA WITH URINARY HESITANCY: ICD-10-CM

## 2023-11-30 RX ORDER — ROSUVASTATIN CALCIUM 20 MG/1
20 TABLET, COATED ORAL DAILY
Qty: 30 TABLET | Refills: 0 | Status: SHIPPED | OUTPATIENT
Start: 2023-11-30 | End: 2023-12-13

## 2023-12-01 RX ORDER — TAMSULOSIN HYDROCHLORIDE 0.4 MG/1
0.4 CAPSULE ORAL DAILY
Qty: 90 CAPSULE | Refills: 0 | Status: SHIPPED | OUTPATIENT
Start: 2023-12-01 | End: 2023-12-13

## 2023-12-07 SDOH — HEALTH STABILITY: PHYSICAL HEALTH: ON AVERAGE, HOW MANY MINUTES DO YOU ENGAGE IN EXERCISE AT THIS LEVEL?: 40 MIN

## 2023-12-07 SDOH — HEALTH STABILITY: PHYSICAL HEALTH: ON AVERAGE, HOW MANY DAYS PER WEEK DO YOU ENGAGE IN MODERATE TO STRENUOUS EXERCISE (LIKE A BRISK WALK)?: 2 DAYS

## 2023-12-07 ASSESSMENT — SOCIAL DETERMINANTS OF HEALTH (SDOH)
IN A TYPICAL WEEK, HOW MANY TIMES DO YOU TALK ON THE PHONE WITH FAMILY, FRIENDS, OR NEIGHBORS?: ONCE A WEEK
HOW OFTEN DO YOU ATTENT MEETINGS OF THE CLUB OR ORGANIZATION YOU BELONG TO?: MORE THAN 4 TIMES PER YEAR
HOW OFTEN DO YOU ATTEND CHURCH OR RELIGIOUS SERVICES?: NEVER
DO YOU BELONG TO ANY CLUBS OR ORGANIZATIONS SUCH AS CHURCH GROUPS UNIONS, FRATERNAL OR ATHLETIC GROUPS, OR SCHOOL GROUPS?: YES
HOW OFTEN DO YOU GET TOGETHER WITH FRIENDS OR RELATIVES?: TWICE A WEEK

## 2023-12-07 ASSESSMENT — ENCOUNTER SYMPTOMS
CONSTIPATION: 0
HEMATOCHEZIA: 0
JOINT SWELLING: 0
DIZZINESS: 0
SHORTNESS OF BREATH: 0
DIARRHEA: 0
NAUSEA: 0
FREQUENCY: 1
NERVOUS/ANXIOUS: 0
COUGH: 0
WEAKNESS: 0
HEADACHES: 0
SORE THROAT: 0
PARESTHESIAS: 0
EYE PAIN: 0
PALPITATIONS: 0
MYALGIAS: 0
HEARTBURN: 0
DYSURIA: 0
FEVER: 0
ABDOMINAL PAIN: 0
HEMATURIA: 0
ARTHRALGIAS: 0
CHILLS: 0

## 2023-12-07 ASSESSMENT — LIFESTYLE VARIABLES
HOW OFTEN DO YOU HAVE A DRINK CONTAINING ALCOHOL: 2-4 TIMES A MONTH
HOW OFTEN DO YOU HAVE SIX OR MORE DRINKS ON ONE OCCASION: NEVER
SKIP TO QUESTIONS 9-10: 0
AUDIT-C TOTAL SCORE: -1
HOW MANY STANDARD DRINKS CONTAINING ALCOHOL DO YOU HAVE ON A TYPICAL DAY: PATIENT DECLINED

## 2023-12-13 ENCOUNTER — OFFICE VISIT (OUTPATIENT)
Dept: FAMILY MEDICINE | Facility: CLINIC | Age: 58
End: 2023-12-13
Attending: PHYSICIAN ASSISTANT
Payer: COMMERCIAL

## 2023-12-13 VITALS
HEIGHT: 70 IN | TEMPERATURE: 97.9 F | RESPIRATION RATE: 18 BRPM | OXYGEN SATURATION: 96 % | BODY MASS INDEX: 29.48 KG/M2 | DIASTOLIC BLOOD PRESSURE: 74 MMHG | SYSTOLIC BLOOD PRESSURE: 128 MMHG | HEART RATE: 71 BPM | WEIGHT: 205.9 LBS

## 2023-12-13 DIAGNOSIS — E78.5 HYPERLIPIDEMIA LDL GOAL <70: ICD-10-CM

## 2023-12-13 DIAGNOSIS — R73.01 ELEVATED FASTING GLUCOSE: ICD-10-CM

## 2023-12-13 DIAGNOSIS — R39.11 BENIGN PROSTATIC HYPERPLASIA WITH URINARY HESITANCY: ICD-10-CM

## 2023-12-13 DIAGNOSIS — K21.00 GASTROESOPHAGEAL REFLUX DISEASE WITH ESOPHAGITIS WITHOUT HEMORRHAGE: ICD-10-CM

## 2023-12-13 DIAGNOSIS — N40.1 BENIGN PROSTATIC HYPERPLASIA WITH URINARY HESITANCY: ICD-10-CM

## 2023-12-13 DIAGNOSIS — Z00.00 ROUTINE HISTORY AND PHYSICAL EXAMINATION OF ADULT: Primary | ICD-10-CM

## 2023-12-13 DIAGNOSIS — M62.838 MUSCLE SPASM: ICD-10-CM

## 2023-12-13 DIAGNOSIS — Z12.5 SCREENING FOR PROSTATE CANCER: ICD-10-CM

## 2023-12-13 LAB
ERYTHROCYTE [DISTWIDTH] IN BLOOD BY AUTOMATED COUNT: 12.9 % (ref 10–15)
HBA1C MFR BLD: 5.9 % (ref 0–5.6)
HCT VFR BLD AUTO: 47.4 % (ref 40–53)
HGB BLD-MCNC: 16.3 G/DL (ref 13.3–17.7)
MCH RBC QN AUTO: 29.7 PG (ref 26.5–33)
MCHC RBC AUTO-ENTMCNC: 34.4 G/DL (ref 31.5–36.5)
MCV RBC AUTO: 87 FL (ref 78–100)
PLATELET # BLD AUTO: 289 10E3/UL (ref 150–450)
RBC # BLD AUTO: 5.48 10E6/UL (ref 4.4–5.9)
WBC # BLD AUTO: 6 10E3/UL (ref 4–11)

## 2023-12-13 PROCEDURE — 36415 COLL VENOUS BLD VENIPUNCTURE: CPT | Performed by: PHYSICIAN ASSISTANT

## 2023-12-13 PROCEDURE — 90471 IMMUNIZATION ADMIN: CPT | Performed by: PHYSICIAN ASSISTANT

## 2023-12-13 PROCEDURE — G0103 PSA SCREENING: HCPCS | Performed by: PHYSICIAN ASSISTANT

## 2023-12-13 PROCEDURE — 85027 COMPLETE CBC AUTOMATED: CPT | Performed by: PHYSICIAN ASSISTANT

## 2023-12-13 PROCEDURE — 99396 PREV VISIT EST AGE 40-64: CPT | Mod: 25 | Performed by: PHYSICIAN ASSISTANT

## 2023-12-13 PROCEDURE — 80061 LIPID PANEL: CPT | Performed by: PHYSICIAN ASSISTANT

## 2023-12-13 PROCEDURE — 83036 HEMOGLOBIN GLYCOSYLATED A1C: CPT | Performed by: PHYSICIAN ASSISTANT

## 2023-12-13 PROCEDURE — 80053 COMPREHEN METABOLIC PANEL: CPT | Performed by: PHYSICIAN ASSISTANT

## 2023-12-13 PROCEDURE — 99214 OFFICE O/P EST MOD 30 MIN: CPT | Mod: 25 | Performed by: PHYSICIAN ASSISTANT

## 2023-12-13 PROCEDURE — 90677 PCV20 VACCINE IM: CPT | Performed by: PHYSICIAN ASSISTANT

## 2023-12-13 PROCEDURE — 90750 HZV VACC RECOMBINANT IM: CPT | Performed by: PHYSICIAN ASSISTANT

## 2023-12-13 PROCEDURE — 90472 IMMUNIZATION ADMIN EACH ADD: CPT | Performed by: PHYSICIAN ASSISTANT

## 2023-12-13 PROCEDURE — 90678 RSV VACC PREF BIVALENT IM: CPT | Performed by: PHYSICIAN ASSISTANT

## 2023-12-13 RX ORDER — CYCLOBENZAPRINE HCL 5 MG
5 TABLET ORAL
Qty: 30 TABLET | Refills: 1 | Status: SHIPPED | OUTPATIENT
Start: 2023-12-13

## 2023-12-13 RX ORDER — FENOFIBRATE 145 MG/1
145 TABLET, COATED ORAL DAILY
Qty: 90 TABLET | Refills: 3 | Status: SHIPPED | OUTPATIENT
Start: 2023-12-13

## 2023-12-13 RX ORDER — METFORMIN HCL 500 MG
TABLET, EXTENDED RELEASE 24 HR ORAL
Qty: 90 TABLET | Refills: 3 | Status: SHIPPED | OUTPATIENT
Start: 2023-12-13

## 2023-12-13 RX ORDER — OMEPRAZOLE 40 MG/1
CAPSULE, DELAYED RELEASE ORAL
Qty: 90 CAPSULE | Refills: 3 | Status: SHIPPED | OUTPATIENT
Start: 2023-12-13

## 2023-12-13 RX ORDER — ROSUVASTATIN CALCIUM 20 MG/1
20 TABLET, COATED ORAL DAILY
Qty: 90 TABLET | Refills: 3 | Status: SHIPPED | OUTPATIENT
Start: 2023-12-13

## 2023-12-13 RX ORDER — TAMSULOSIN HYDROCHLORIDE 0.4 MG/1
0.8 CAPSULE ORAL DAILY
Qty: 180 CAPSULE | Refills: 3 | Status: SHIPPED | OUTPATIENT
Start: 2023-12-13

## 2023-12-13 ASSESSMENT — ENCOUNTER SYMPTOMS
CHILLS: 0
HEARTBURN: 0
NAUSEA: 0
PARESTHESIAS: 0
FEVER: 0
DIARRHEA: 0
PALPITATIONS: 0
WEAKNESS: 0
SHORTNESS OF BREATH: 0
COUGH: 0
DYSURIA: 0
SORE THROAT: 0
HEMATURIA: 0
MYALGIAS: 0
HEMATOCHEZIA: 0
ABDOMINAL PAIN: 0
ARTHRALGIAS: 0
EYE PAIN: 0
CONSTIPATION: 0
FREQUENCY: 1
JOINT SWELLING: 0
NERVOUS/ANXIOUS: 0
HEADACHES: 0
DIZZINESS: 0

## 2023-12-13 NOTE — PROGRESS NOTES
SUBJECTIVE:   Willard is a 58 year old, presenting for the following:  Physical        12/13/2023     3:34 PM   Additional Questions   Roomed by Carmen Null       Healthy Habits:     Getting at least 3 servings of Calcium per day:  Yes    Bi-annual eye exam:  Yes    Dental care twice a year:  Yes    Sleep apnea or symptoms of sleep apnea:  None    Diet:  Regular (no restrictions)    Frequency of exercise:  2-3 days/week    Duration of exercise:  30-45 minutes    Taking medications regularly:  Yes    Medication side effects:  None    Additional concerns today:  No        Genitourinary - Male--follow-up BPH    Description:   Dysuria (painful urination): no   Hematuria (blood in urine): no   Frequency: YES  Are you urinating at night : no   Hesitancy (delay in urine): YES  Retention (unable to empty): no   Decrease in urinary flow: YES  Incontinence: no, but increased urgency      Continues with metformin daily for elevated fasting glucose. Tolerates well.           Hyperlipidemia Follow-Up    Are you regularly taking any medication or supplement to lower your cholesterol?   yes  Are you having muscle aches or other side effects that you think could be caused by your cholesterol lowering medication?  No      Social History     Tobacco Use    Smoking status: Never    Smokeless tobacco: Never   Substance Use Topics    Alcohol use: Yes     Comment: 1-2 drinks a week             12/7/2023     4:28 PM   Alcohol Use   Prescreen: >3 drinks/day or >7 drinks/week? No       Last PSA:   PSA   Date Value Ref Range Status   12/07/2020 0.87 0 - 4 ug/L Final     Comment:     Assay Method:  Chemiluminescence using Siemens Vista analyzer     Prostate Specific Antigen Screen   Date Value Ref Range Status   02/18/2022 1.05 0.00 - 4.00 ug/L Final       Reviewed orders with patient. Reviewed health maintenance and updated orders accordingly - Yes  Lab work is in process    Reviewed and updated as needed this visit by clinical staff    "Tobacco  Allergies  Meds              Reviewed and updated as needed this visit by Provider                 Past Medical History:   Diagnosis Date    Arthritis 1/2011    Blood clotting disorder (H24)     Cancer (H)     Skin    Chiari malformation type I (H)     Elevated fasting glucose 1/19/2012    Headaches, migraine     Headaches, migraine     Other and unspecified hyperlipidemia     Pedal edema 6/10/2021    Rotator cuff tear 1/22/2012    Rotator cuff tear 1/22/2012    Rotator cuff tendinitis 1/2011    Inj. Dr. Cheney.     Spinal headache 2007    spontaneous spinal fluid leak - 2 blood patches    Urethral polyp 6-24-10    Dr. Card    Urethral polyp 6/24/2010    Dr. Card     Varicose veins of both lower extremities 6/10/2021        Review of Systems   Constitutional:  Negative for chills and fever.   HENT:  Positive for congestion and hearing loss. Negative for ear pain and sore throat.    Eyes:  Negative for pain and visual disturbance.   Respiratory:  Negative for cough and shortness of breath.    Cardiovascular:  Negative for chest pain, palpitations and peripheral edema.   Gastrointestinal:  Negative for abdominal pain, constipation, diarrhea, heartburn, hematochezia and nausea.   Genitourinary:  Positive for frequency. Negative for dysuria, genital sores, hematuria, impotence, penile discharge and urgency.   Musculoskeletal:  Negative for arthralgias, joint swelling and myalgias.   Skin:  Negative for rash.   Neurological:  Negative for dizziness, weakness, headaches and paresthesias.   Psychiatric/Behavioral:  Negative for mood changes. The patient is not nervous/anxious.          OBJECTIVE:   /74   Pulse 71   Temp 97.9  F (36.6  C) (Oral)   Resp 18   Ht 1.778 m (5' 10\")   Wt 93.4 kg (205 lb 14.4 oz)   SpO2 96%   BMI 29.54 kg/m      Physical Exam  GENERAL: healthy, alert and no distress  EYES: Eyes grossly normal to inspection, PERRL and conjunctivae and sclerae normal  HENT: ear canals " and TM's normal, nose and mouth without ulcers or lesions  NECK: no adenopathy, no asymmetry, masses, or scars and thyroid normal to palpation  RESP: lungs clear to auscultation - no rales, rhonchi or wheezes  CV: regular rate and rhythm, normal S1 S2, no S3 or S4, no murmur, click or rub, no peripheral edema and peripheral pulses strong  ABDOMEN: soft, nontender, no hepatosplenomegaly, no masses and bowel sounds normal  MS: no gross musculoskeletal defects noted, no edema  SKIN: no suspicious lesions or rashes  NEURO: Normal strength and tone, mentation intact and speech normal  PSYCH: mentation appears normal, affect normal/bright        ASSESSMENT/PLAN:   (Z00.00) Routine history and physical examination of adult  (primary encounter diagnosis)  Comment:   Plan: Hemoglobin A1c, Comprehensive metabolic panel         (BMP + Alb, Alk Phos, ALT, AST, Total. Bili,         TP), CBC with platelets            (N40.1,  R39.11) Benign prostatic hyperplasia with urinary hesitancy  Comment: will increase to 0.8 mg daily. If symptoms persist, please contact us. May need to change med vs. Urology referral.   Plan: tamsulosin (FLOMAX) 0.4 MG capsule            (R73.01) Elevated fasting glucose  Comment: await labs.   Plan: metFORMIN (GLUCOPHAGE XR) 500 MG 24 hr tablet            (K21.00) Gastroesophageal reflux disease with esophagitis without hemorrhage  Comment: symptoms stable.   Plan: omeprazole (PRILOSEC) 40 MG DR capsule            (E78.5) Hyperlipidemia LDL goal <70  Comment: await labs.   Plan: Lipid panel reflex to direct LDL Non-fasting,         fenofibrate (TRICOR) 145 MG tablet,         rosuvastatin (CRESTOR) 20 MG tablet            (M62.838) Muscle spasm  Comment: use as needed   Plan: cyclobenzaprine (FLEXERIL) 5 MG tablet            (Z12.5) Screening for prostate cancer  Comment:   Plan: PSA, screen            Patient has been advised of split billing requirements and indicates understanding: No      COUNSELING:  "  Reviewed preventive health counseling, as reflected in patient instructions       Regular exercise       Healthy diet/nutrition       Vision screening       Pneumococcal Vaccine          Immunizations  Vaccinated for: Pneumococcal and Zoster, RSV         Prostate cancer screening      BMI:   Estimated body mass index is 29.54 kg/m  as calculated from the following:    Height as of this encounter: 1.778 m (5' 10\").    Weight as of this encounter: 93.4 kg (205 lb 14.4 oz).   Weight management plan: Discussed healthy diet and exercise guidelines      He reports that he has never smoked. He has never used smokeless tobacco.            SHAWN Van [facility]  "

## 2023-12-14 LAB
ALBUMIN SERPL BCG-MCNC: 4.8 G/DL (ref 3.5–5.2)
ALP SERPL-CCNC: 31 U/L (ref 40–150)
ALT SERPL W P-5'-P-CCNC: 39 U/L (ref 0–70)
ANION GAP SERPL CALCULATED.3IONS-SCNC: 11 MMOL/L (ref 7–15)
AST SERPL W P-5'-P-CCNC: 33 U/L (ref 0–45)
BILIRUB SERPL-MCNC: 0.7 MG/DL
BUN SERPL-MCNC: 22.3 MG/DL (ref 8–23)
CALCIUM SERPL-MCNC: 9.8 MG/DL (ref 8.8–10.2)
CHLORIDE SERPL-SCNC: 101 MMOL/L (ref 98–107)
CHOLEST SERPL-MCNC: 164 MG/DL
CREAT SERPL-MCNC: 1.12 MG/DL (ref 0.67–1.17)
DEPRECATED HCO3 PLAS-SCNC: 28 MMOL/L (ref 22–29)
EGFRCR SERPLBLD CKD-EPI 2021: 75 ML/MIN/1.73M2
FASTING STATUS PATIENT QL REPORTED: YES
GLUCOSE SERPL-MCNC: 92 MG/DL (ref 70–99)
HDLC SERPL-MCNC: 47 MG/DL
LDLC SERPL CALC-MCNC: 101 MG/DL
NONHDLC SERPL-MCNC: 117 MG/DL
POTASSIUM SERPL-SCNC: 4.2 MMOL/L (ref 3.4–5.3)
PROT SERPL-MCNC: 7.3 G/DL (ref 6.4–8.3)
PSA SERPL DL<=0.01 NG/ML-MCNC: 0.81 NG/ML (ref 0–4.5)
SODIUM SERPL-SCNC: 140 MMOL/L (ref 135–145)
TRIGL SERPL-MCNC: 81 MG/DL

## 2024-01-03 NOTE — PROGRESS NOTES
~Cardiology Clinic Visit~    Primary Cardiologist: Dr. Cox  Last Office Visit: 11/7/2022  Reason for visit: Medication change follow up     He is a very pleasant 57 year old male with a past medical history notable for hyperlipidemia and mildly elevated CT coronary calcium score. He recently met with Dr. Cecilai Cox in cardiology consultation last month for evaluation of shortness of breath with exertion and occasional nagging left-sided chest pressure, both at rest and with stress with no radiation.      He has a family history of coronary artery disease in one of his brothers who suffered a heart attack in his 50s and 60s.  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     He underwent CT coronary calcium scoring on 5/5/2022 showing a total Agatston calcium score of 22.1, concentrated in the left anterior descending artery.  Incidentally noted were some small pulmonary nodules that were < 6 mm.     For further evaluation of his chest pressure and reassessment of the small pulmonary nodules given significant secondhand smoke exposure, Dr. Cox recommended an exercise nuclear stress test, echocardiogram, and repeat CT chest in about 12 months.     The exercise nuclear stress test was completed on 5/20/2022 showing average exercise capacity, 3/10 baseline chest pain was noted at the time of the test that did not change with exercise. No ischemic EKG changes were noted. The nuclear stress test was negative for inducible myocardial ischemia or infarction. The left ventricular ejection fraction at stress is greater than 70%. No changes when directly compared to nuclear stress from 2015.     Patient had no new cardiac symptoms at our last visit together, he was feeling well aside to having a sinus infection.  He remains active by walking his dog 3-4 times a week and has no associated  shortness of breath with activity.  At that visit, he was recommended to change from simvastatin to rosuvastatin for improved cholesterol management.     Interval 02/06/23     Patient is doing quite well from a cardiac standpoint.  Somehow, his statin was changed from Rosuvastatin to Simvastatin and his cholesterol profile worsened.  He does not recall having any symptoms on the statin medications.  He remains active and hopes to incorporate a formal exercise program into his routine soon - he anticipates a roommate moving out soon and will exercise better at home.   He is motivated to improve his diet, keep good control of BP and manage his weight for overall cardiac optimization.  __________________________________________________________________         Assessment and Impression:     Mildly elevated coronary artery calcium score  Exertional dyspnea and chest pain with typical and atypical features  Family history of CAD in his brother - soon to have double bypass surgery.  -No anginal symptoms.  -Recent echocardiogram demonstrated normal EF function 55-60%, no acute abnormalities.  -Is compliant with his medications.  -Currently on aspirin, rosuvastatin.     Hyperlipidemia  - Most recent lipid panel 11/6/22: , HDL 24, , .  -On rosuvastatin.  -LDL goal < 100 mg/dl, can consider an increase statin if needed (40 mg daily) but would check LFTs, CPK first.     Small pulmonary nodules with history of significant secondhand smoke exposure  -Remote monitoring; Follow with yearly CT for trends.         Recommendations and Plan:     Stop Simvastatin.  Resume Rosuvastatin 20 mg/d.  Discussed healthy cardiovascular guidelines - Mediterranean style diet, daily purposeful activity, low-sodium diet, and general weight management.  Recheck BMP and ALT in about 2-months.  Follow up in 1-year with Dr. Cox for annual visit.  __________________________________________________________________    Thank you for  the opportunity to participate in this pleasant patient's care.    We would be happy to see this patient sooner for any concerns in the meantime.    ANDREW Dee, CNP   Nurse Practitioner  [facility]    Today's clinic visit entailed:  Review of the result(s) of each unique test - cardiac testing and imaging, reviewed other office visit testing and notes, labs reviewed  The following tests were independently interpreted by me as noted in my documentation: labs  Ordering of each unique test  Prescription drug management    The level of medical decision making during this visit was of moderate complexity.    Orders this Visit:  Orders Placed This Encounter   Procedures    Lipid panel reflex to direct LDL Fasting    ALT    Follow-Up with Cardiology     Orders Placed This Encounter   Medications    simvastatin (ZOCOR) 20 MG tablet     Sig: Take 20 mg by mouth at bedtime     Medications Discontinued During This Encounter   Medication Reason    triamcinolone (KENALOG) 0.1 % external ointment Therapy completed (No AVS)     Encounter Diagnoses   Name Primary?    Hyperlipidemia LDL goal <70     Elevated coronary artery calcium score Yes    Family history of ischemic heart disease     Pulmonary nodules     Secondhand smoke exposure      CURRENT MEDICATIONS:  Current Outpatient Medications   Medication Sig Dispense Refill    aspirin 81 MG EC tablet Take 81 mg by mouth daily OTC      cyclobenzaprine (FLEXERIL) 5 MG tablet Take 1 tablet (5 mg) by mouth nightly as needed for muscle spasms 30 tablet 1    fenofibrate (TRICOR) 145 MG tablet Take 1 tablet (145 mg) by mouth daily 90 tablet 3    loratadine (CLARITIN) 10 MG tablet Take 10 mg by mouth daily      metFORMIN (GLUCOPHAGE XR) 500 MG 24 hr tablet TAKE 1 TABLET(500 MG) BY MOUTH DAILY WITH DINNER Strength: 500 mg 90 tablet 3    Omega-3 Fatty Acids (FISH OIL PO) Take 300 mg by mouth daily      omeprazole (PRILOSEC) 40 MG DR capsule TAKE 1 CAPSULE(40  "MG) BY MOUTH DAILY 30 TO 60 MINUTES BEFORE A MEAL 90 capsule 3    simvastatin (ZOCOR) 20 MG tablet Take 20 mg by mouth at bedtime      tamsulosin (FLOMAX) 0.4 MG capsule Take 2 capsules (0.8 mg) by mouth daily 180 capsule 3    rosuvastatin (CRESTOR) 20 MG tablet Take 1 tablet (20 mg) by mouth daily (Patient not taking: Reported on 1/8/2024) 90 tablet 3     ALLERGIES     Allergies   Allergen Reactions    Mold Shortness Of Breath    Cats      Congestion and drainage    Grass Cough    No Known Allergies      PAST MEDICAL, SURGICAL, FAMILY, SOCIAL HISTORY:  History was reviewed and updated as needed, see medical record.    Review of Systems:  A 10-point Review Of Systems is otherwise normal except for that which is noted in the HPI and interval summary.    Physical Exam:    Vitals: /87   Pulse 75   Ht 1.778 m (5' 10\")   Wt 94.5 kg (208 lb 6.4 oz)   SpO2 96%   BMI 29.90 kg/m    Constitutional: Appears stated age, well nourished, NAD.  Neck: Supple. Carotid pulses full and equal.   Respiratory: Non-labored. Lungs CTAB.  Cardiovascular: RRR, normal S1 and S2. No M/G/R.  No edema.  GI: Soft, non-distended, non-tender.  Skin: Warm and dry.   Musculoskeletal/Extremities: Symmetrical movement.  Neurologic: No gross focal deficits. Alert, awake.  Psychiatric: Affect appropriate. Mentation normal.    Recent Lab Results:  LIPID RESULTS:  Lab Results   Component Value Date    CHOL 164 12/13/2023    CHOL 216 (H) 12/07/2020    HDL 47 12/13/2023    HDL 42 12/07/2020     (H) 12/13/2023     (H) 12/07/2020    TRIG 81 12/13/2023    TRIG 92 12/07/2020    CHOLHDLRATIO 4.7 08/20/2014     LIVER ENZYME RESULTS:  Lab Results   Component Value Date    AST 33 12/13/2023    AST 25 12/07/2020    ALT 39 12/13/2023    ALT 40 12/07/2020     CBC RESULTS:  Lab Results   Component Value Date    WBC 6.0 12/13/2023    WBC 5.2 12/07/2020    RBC 5.48 12/13/2023    RBC 5.34 12/07/2020    HGB 16.3 12/13/2023    HGB 15.8 12/07/2020    " HCT 47.4 12/13/2023    HCT 46.5 12/07/2020    MCV 87 12/13/2023    MCV 87 12/07/2020    MCH 29.7 12/13/2023    MCH 29.6 12/07/2020    MCHC 34.4 12/13/2023    MCHC 34.0 12/07/2020    RDW 12.9 12/13/2023    RDW 13.4 12/07/2020     12/13/2023     12/07/2020     BMP RESULTS:  Lab Results   Component Value Date     12/13/2023     12/07/2020    POTASSIUM 4.2 12/13/2023    POTASSIUM 4.1 12/07/2020    CHLORIDE 101 12/13/2023    CHLORIDE 104 12/07/2020    CO2 28 12/13/2023    CO2 25 12/07/2020    ANIONGAP 11 12/13/2023    ANIONGAP 6 12/07/2020    GLC 92 12/13/2023    GLC 89 12/07/2020    BUN 22.3 12/13/2023    BUN 23 12/07/2020    CR 1.12 12/13/2023    CR 1.12 12/07/2020    GFRESTIMATED 75 12/13/2023    GFRESTIMATED 72 12/07/2020    GFRESTBLACK 84 12/07/2020    MAR 9.8 12/13/2023    MAR 9.1 12/07/2020      A1C RESULTS:  Lab Results   Component Value Date    A1C 5.9 (H) 12/13/2023    A1C 6.1 (H) 12/07/2020     INR RESULTS:  Lab Results   Component Value Date    INR 1.05 06/14/2018    INR 1.03 06/07/2007

## 2024-01-08 ENCOUNTER — OFFICE VISIT (OUTPATIENT)
Dept: CARDIOLOGY | Facility: CLINIC | Age: 59
End: 2024-01-08
Attending: NURSE PRACTITIONER
Payer: COMMERCIAL

## 2024-01-08 VITALS
HEIGHT: 70 IN | SYSTOLIC BLOOD PRESSURE: 136 MMHG | BODY MASS INDEX: 29.84 KG/M2 | WEIGHT: 208.4 LBS | OXYGEN SATURATION: 96 % | HEART RATE: 75 BPM | DIASTOLIC BLOOD PRESSURE: 87 MMHG

## 2024-01-08 DIAGNOSIS — Z77.22 SECONDHAND SMOKE EXPOSURE: ICD-10-CM

## 2024-01-08 DIAGNOSIS — Z82.49 FAMILY HISTORY OF ISCHEMIC HEART DISEASE: ICD-10-CM

## 2024-01-08 DIAGNOSIS — E78.5 HYPERLIPIDEMIA LDL GOAL <70: ICD-10-CM

## 2024-01-08 DIAGNOSIS — R91.8 PULMONARY NODULES: ICD-10-CM

## 2024-01-08 DIAGNOSIS — R93.1 ELEVATED CORONARY ARTERY CALCIUM SCORE: Primary | ICD-10-CM

## 2024-01-08 PROCEDURE — 99214 OFFICE O/P EST MOD 30 MIN: CPT | Performed by: NURSE PRACTITIONER

## 2024-01-08 RX ORDER — SIMVASTATIN 20 MG
20 TABLET ORAL AT BEDTIME
COMMUNITY

## 2024-01-08 NOTE — LETTER
1/8/2024    Cassandra Villafana PA-C  [address] [facility] [address]    RE: Willard Manzano       Dear Colleague,     I had the pleasure of seeing Willard Manzano in the [facility].              ~Cardiology Clinic Visit~    Primary Cardiologist: Dr. Cox  Last Office Visit: 11/7/2022  Reason for visit: Medication change follow up     He is a very pleasant 57 year old male with a past medical history notable for hyperlipidemia and mildly elevated CT coronary calcium score. He recently met with Dr. Cecilia Cox in cardiology consultation last month for evaluation of shortness of breath with exertion and occasional nagging left-sided chest pressure, both at rest and with stress with no radiation.      He has a family history of coronary artery disease in one of his brothers who suffered a heart attack in his 50s and 60s.  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     He underwent CT coronary calcium scoring on 5/5/2022 showing a total Agatston calcium score of 22.1, concentrated in the left anterior descending artery.  Incidentally noted were some small pulmonary nodules that were < 6 mm.     For further evaluation of his chest pressure and reassessment of the small pulmonary nodules given significant secondhand smoke exposure, Dr. Cox recommended an exercise nuclear stress test, echocardiogram, and repeat CT chest in about 12 months.     The exercise nuclear stress test was completed on 5/20/2022 showing average exercise capacity, 3/10 baseline chest pain was noted at the time of the test that did not change with exercise. No ischemic EKG changes were noted. The nuclear stress test was negative for inducible myocardial ischemia or infarction. The left ventricular ejection fraction at stress is greater than 70%. No changes when directly compared to nuclear stress from 2015.      Patient had no new cardiac symptoms at our last visit together, he was feeling well aside to having a sinus infection.  He remains active by walking his dog 3-4 times a week and has no associated shortness of breath with activity.  At that visit, he was recommended to change from simvastatin to rosuvastatin for improved cholesterol management.     Interval 02/06/23     Patient is doing quite well from a cardiac standpoint.  Somehow, his statin was changed from Rosuvastatin to Simvastatin and his cholesterol profile worsened.  He does not recall having any symptoms on the statin medications.  He remains active and hopes to incorporate a formal exercise program into his routine soon - he anticipates a roommate moving out soon and will exercise better at home.   He is motivated to improve his diet, keep good control of BP and manage his weight for overall cardiac optimization.  __________________________________________________________________         Assessment and Impression:     Mildly elevated coronary artery calcium score  Exertional dyspnea and chest pain with typical and atypical features  Family history of CAD in his brother - soon to have double bypass surgery.  -No anginal symptoms.  -Recent echocardiogram demonstrated normal EF function 55-60%, no acute abnormalities.  -Is compliant with his medications.  -Currently on aspirin, rosuvastatin.     Hyperlipidemia, LDL goal <70  - Most recent lipid panel 11/6/22: , HDL 24, , .  -On rosuvastatin.  -LDL goal < 100 mg/dl, can consider an increase statin if needed (40 mg daily) but would check LFTs, CPK first.     Small pulmonary nodules with history of significant secondhand smoke exposure  -Remote monitoring; Follow with yearly CT for trends.         Recommendations and Plan:     Stop Simvastatin.  Resume Rosuvastatin 20 mg/d.  Discussed healthy cardiovascular guidelines - Mediterranean style diet, daily purposeful activity, low-sodium diet,  and general weight management.  Recheck BMP and ALT in about 2-months.  Follow up in 1-year with Dr. Cox for annual visit.  __________________________________________________________________    Thank you for the opportunity to participate in this pleasant patient's care.    We would be happy to see this patient sooner for any concerns in the meantime.    ANDREW Dee, CNP   Nurse Practitioner  [facility]    Today's clinic visit entailed:  Review of the result(s) of each unique test - cardiac testing and imaging, reviewed other office visit testing and notes, labs reviewed  The following tests were independently interpreted by me as noted in my documentation: labs  Ordering of each unique test  Prescription drug management    The level of medical decision making during this visit was of moderate complexity.    Orders this Visit:  Orders Placed This Encounter   Procedures    Lipid panel reflex to direct LDL Fasting    ALT    Follow-Up with Cardiology     Orders Placed This Encounter   Medications    simvastatin (ZOCOR) 20 MG tablet     Sig: Take 20 mg by mouth at bedtime     Medications Discontinued During This Encounter   Medication Reason    triamcinolone (KENALOG) 0.1 % external ointment Therapy completed (No AVS)     Encounter Diagnoses   Name Primary?    Hyperlipidemia LDL goal <70     Elevated coronary artery calcium score Yes    Family history of ischemic heart disease     Pulmonary nodules     Secondhand smoke exposure      CURRENT MEDICATIONS:  Current Outpatient Medications   Medication Sig Dispense Refill    aspirin 81 MG EC tablet Take 81 mg by mouth daily OTC      cyclobenzaprine (FLEXERIL) 5 MG tablet Take 1 tablet (5 mg) by mouth nightly as needed for muscle spasms 30 tablet 1    fenofibrate (TRICOR) 145 MG tablet Take 1 tablet (145 mg) by mouth daily 90 tablet 3    loratadine (CLARITIN) 10 MG tablet Take 10 mg by mouth daily      metFORMIN (GLUCOPHAGE XR) 500 MG 24 hr  "tablet TAKE 1 TABLET(500 MG) BY MOUTH DAILY WITH DINNER Strength: 500 mg 90 tablet 3    Omega-3 Fatty Acids (FISH OIL PO) Take 300 mg by mouth daily      omeprazole (PRILOSEC) 40 MG DR capsule TAKE 1 CAPSULE(40 MG) BY MOUTH DAILY 30 TO 60 MINUTES BEFORE A MEAL 90 capsule 3    simvastatin (ZOCOR) 20 MG tablet Take 20 mg by mouth at bedtime      tamsulosin (FLOMAX) 0.4 MG capsule Take 2 capsules (0.8 mg) by mouth daily 180 capsule 3    rosuvastatin (CRESTOR) 20 MG tablet Take 1 tablet (20 mg) by mouth daily (Patient not taking: Reported on 1/8/2024) 90 tablet 3     ALLERGIES     Allergies   Allergen Reactions    Mold Shortness Of Breath    Cats      Congestion and drainage    Grass Cough    No Known Allergies      PAST MEDICAL, SURGICAL, FAMILY, SOCIAL HISTORY:  History was reviewed and updated as needed, see medical record.    Review of Systems:  A 10-point Review Of Systems is otherwise normal except for that which is noted in the HPI and interval summary.    Physical Exam:    Vitals: /87   Pulse 75   Ht 1.778 m (5' 10\")   Wt 94.5 kg (208 lb 6.4 oz)   SpO2 96%   BMI 29.90 kg/m    Constitutional: Appears stated age, well nourished, NAD.  Neck: Supple. Carotid pulses full and equal.   Respiratory: Non-labored. Lungs CTAB.  Cardiovascular: RRR, normal S1 and S2. No M/G/R.  No edema.  GI: Soft, non-distended, non-tender.  Skin: Warm and dry.   Musculoskeletal/Extremities: Symmetrical movement.  Neurologic: No gross focal deficits. Alert, awake.  Psychiatric: Affect appropriate. Mentation normal.    Recent Lab Results:  LIPID RESULTS:  Lab Results   Component Value Date    CHOL 164 12/13/2023    CHOL 216 (H) 12/07/2020    HDL 47 12/13/2023    HDL 42 12/07/2020     (H) 12/13/2023     (H) 12/07/2020    TRIG 81 12/13/2023    TRIG 92 12/07/2020    CHOLHDLRATIO 4.7 08/20/2014     LIVER ENZYME RESULTS:  Lab Results   Component Value Date    AST 33 12/13/2023    AST 25 12/07/2020    ALT 39 12/13/2023    " ALT 40 12/07/2020     CBC RESULTS:  Lab Results   Component Value Date    WBC 6.0 12/13/2023    WBC 5.2 12/07/2020    RBC 5.48 12/13/2023    RBC 5.34 12/07/2020    HGB 16.3 12/13/2023    HGB 15.8 12/07/2020    HCT 47.4 12/13/2023    HCT 46.5 12/07/2020    MCV 87 12/13/2023    MCV 87 12/07/2020    MCH 29.7 12/13/2023    MCH 29.6 12/07/2020    MCHC 34.4 12/13/2023    MCHC 34.0 12/07/2020    RDW 12.9 12/13/2023    RDW 13.4 12/07/2020     12/13/2023     12/07/2020     BMP RESULTS:  Lab Results   Component Value Date     12/13/2023     12/07/2020    POTASSIUM 4.2 12/13/2023    POTASSIUM 4.1 12/07/2020    CHLORIDE 101 12/13/2023    CHLORIDE 104 12/07/2020    CO2 28 12/13/2023    CO2 25 12/07/2020    ANIONGAP 11 12/13/2023    ANIONGAP 6 12/07/2020    GLC 92 12/13/2023    GLC 89 12/07/2020    BUN 22.3 12/13/2023    BUN 23 12/07/2020    CR 1.12 12/13/2023    CR 1.12 12/07/2020    GFRESTIMATED 75 12/13/2023    GFRESTIMATED 72 12/07/2020    GFRESTBLACK 84 12/07/2020    MAR 9.8 12/13/2023    MAR 9.1 12/07/2020      A1C RESULTS:  Lab Results   Component Value Date    A1C 5.9 (H) 12/13/2023    A1C 6.1 (H) 12/07/2020     INR RESULTS:  Lab Results   Component Value Date    INR 1.05 06/14/2018    INR 1.03 06/07/2007                     Thank you for allowing me to participate in the care of your patient.      Sincerely,     ANDREW Dee CNP     [facility]  cc:   ANDREW Dee CNP  [address]  [place] [address]

## 2024-01-08 NOTE — PATIENT INSTRUCTIONS
Thank you for your visit with the [facility] today.    Today's Summary     Stop Simvastatin.  Take Rosuvastatin 20 mg/d.  This is a stronger cholesterol medication which will help lower your LDL to a goal of <100.  We want to give you the best cardiac protection based on your strong family history of heart disease.  Repeat BMP and ALT in ~2-months.  Follow up in 1-year with Dr. Cox for annual visit.  Please call 6-months in advance to schedule your appointment.    Exercise for 30-minutes at least 5-days a week,  Mediterranean style diet, low-salt (Mrs. Riddle is a good alternative).  You can try taking a supplement called L-Theanine.    If you have questions or concerns, please do not hesitate to call my nursing support team at [phone].    Scheduling phone number: [phone]    It was a pleasure seeing you today.     ANDREW Dee, CNP  Nurse Practitioner  [facility]  January 8, 2024  ________________________________________________________

## 2024-01-20 DIAGNOSIS — R73.01 ELEVATED FASTING GLUCOSE: ICD-10-CM

## 2024-01-22 RX ORDER — METFORMIN HCL 500 MG
TABLET, EXTENDED RELEASE 24 HR ORAL
Qty: 90 TABLET | Refills: 3 | OUTPATIENT
Start: 2024-01-22

## 2024-02-03 ENCOUNTER — MYC REFILL (OUTPATIENT)
Dept: FAMILY MEDICINE | Facility: CLINIC | Age: 59
End: 2024-02-03
Payer: COMMERCIAL

## 2024-02-03 DIAGNOSIS — N40.1 BENIGN PROSTATIC HYPERPLASIA WITH URINARY HESITANCY: ICD-10-CM

## 2024-02-03 DIAGNOSIS — R39.11 BENIGN PROSTATIC HYPERPLASIA WITH URINARY HESITANCY: ICD-10-CM

## 2024-02-05 RX ORDER — TAMSULOSIN HYDROCHLORIDE 0.4 MG/1
0.8 CAPSULE ORAL DAILY
Qty: 180 CAPSULE | Refills: 3 | OUTPATIENT
Start: 2024-02-05

## 2024-02-20 ENCOUNTER — MYC REFILL (OUTPATIENT)
Dept: FAMILY MEDICINE | Facility: CLINIC | Age: 59
End: 2024-02-20
Payer: COMMERCIAL

## 2024-02-20 DIAGNOSIS — K21.00 GASTROESOPHAGEAL REFLUX DISEASE WITH ESOPHAGITIS WITHOUT HEMORRHAGE: ICD-10-CM

## 2024-02-20 RX ORDER — OMEPRAZOLE 40 MG/1
CAPSULE, DELAYED RELEASE ORAL
Qty: 90 CAPSULE | Refills: 3 | OUTPATIENT
Start: 2024-02-20

## 2024-03-15 ENCOUNTER — OFFICE VISIT (OUTPATIENT)
Dept: URGENT CARE | Facility: URGENT CARE | Age: 59
End: 2024-03-15
Payer: COMMERCIAL

## 2024-03-15 VITALS
HEART RATE: 82 BPM | WEIGHT: 206 LBS | OXYGEN SATURATION: 95 % | RESPIRATION RATE: 16 BRPM | SYSTOLIC BLOOD PRESSURE: 131 MMHG | BODY MASS INDEX: 29.56 KG/M2 | DIASTOLIC BLOOD PRESSURE: 86 MMHG | TEMPERATURE: 98.2 F

## 2024-03-15 DIAGNOSIS — J01.90 ACUTE SINUSITIS WITH SYMPTOMS > 10 DAYS: Primary | ICD-10-CM

## 2024-03-15 PROCEDURE — 99214 OFFICE O/P EST MOD 30 MIN: CPT | Performed by: PHYSICIAN ASSISTANT

## 2024-03-15 RX ORDER — TRAZODONE HYDROCHLORIDE 50 MG/1
TABLET, FILM COATED ORAL
COMMUNITY

## 2024-03-15 NOTE — PROGRESS NOTES
Assessment & Plan     1. Acute sinusitis with symptoms > 10 days  Examination is consistent with sinusitis.  Treatment with medication as below.  Encouraged supportive cares, Tylenol for facial pain, warm compresses etc.   - amoxicillin-clavulanate (AUGMENTIN) 875-125 MG tablet; Take 1 tablet by mouth 2 times daily for 10 days  Dispense: 20 tablet; Refill: 0        Return in about 1 week (around 3/22/2024), or if symptoms worsen or fail to improve.    Diagnosis and treatment plan was reviewed with patient and/or family.   We went over any labs or imaging. Discussed worsening symptoms or little to no relief despite treatment plan to follow-up with PCP or return to clinic.  Patient verbalizes understanding. All questions were addressed and answered.     Mehnaz Lawrence PA-C  [facility]    CHIEF COMPLAINT:   Chief Complaint   Patient presents with    Urgent Care    Sinus Problem     Pt reports cold sx 2-3 weeks OF COLD SXS NOW  with facial soreness by cheekbones, teeth pain FOR 2-3 Days     Subjective     Willard is a 59 year old male who presents to clinic today for evaluation of facial pain.  Symptoms started 2 to 3 weeks ago with URI symptoms, runny nose and congestion.  Now for the past 2 to 3 days he has had left-sided maxillary sinus pain, dental pain pain with bending over etc.  Feels similar to when he had sinus infection in the past.      Past Medical History:   Diagnosis Date    Arthritis 1/2011    Blood clotting disorder (H24)     Cancer (H)     Skin    Chiari malformation type I (H)     Elevated fasting glucose 1/19/2012    Headaches, migraine     Headaches, migraine     Other and unspecified hyperlipidemia     Pedal edema 6/10/2021    Rotator cuff tear 1/22/2012    Rotator cuff tear 1/22/2012    Rotator cuff tendinitis 1/2011    Inj. Dr. Cheney.     Spinal headache 2007    spontaneous spinal fluid leak - 2 blood patches    Urethral polyp 6-24-10    Dr. Card    Urethral polyp  6/24/2010    Dr. Card     Varicose veins of both lower extremities 6/10/2021     Past Surgical History:   Procedure Laterality Date    ABDOMEN SURGERY  2006    Umbilical Hernia repair    BIOPSY      arms cancer, 2 spots    COLONOSCOPY  12/29/2010    non-adenomatous polyp. Advised 5 yr f/u.     COLONOSCOPY N/A 02/19/2021    Procedure: COLONOSCOPY;  Surgeon: Broderick Rivera MD;  Location: [facility]    HC REMOVAL OF TONSILS,<11 Y/O      HERNIA REPAIR  2006    ORTHOPEDIC SURGERY  2018    Rotator Cuff Repair    SOFT TISSUE SURGERY  2023    Left thumb    SURGICAL HISTORY OF -   02/2006    umbilical hernia repair with mesh    ZZHC COLONOSCOPY THRU STOMA, DIAGNOSTIC  06/26/2008    normal. 3 yr f/u due to brother CA age 43.    ZZHC VASECTOMY UNILAT/BILAT W POSTOP SEMEN       Social History     Tobacco Use    Smoking status: Never    Smokeless tobacco: Never   Substance Use Topics    Alcohol use: Yes     Comment: 1-2 drinks a week     Current Outpatient Medications   Medication    amoxicillin-clavulanate (AUGMENTIN) 875-125 MG tablet    aspirin 81 MG EC tablet    fenofibrate (TRICOR) 145 MG tablet    loratadine (CLARITIN) 10 MG tablet    metFORMIN (GLUCOPHAGE XR) 500 MG 24 hr tablet    Omega-3 Fatty Acids (FISH OIL PO)    omeprazole (PRILOSEC) 40 MG DR capsule    rosuvastatin (CRESTOR) 20 MG tablet    tamsulosin (FLOMAX) 0.4 MG capsule    cyclobenzaprine (FLEXERIL) 5 MG tablet    simvastatin (ZOCOR) 20 MG tablet    traZODone (DESYREL) 50 MG tablet     No current facility-administered medications for this visit.     Allergies   Allergen Reactions    Mold Shortness Of Breath    Cats      Congestion and drainage    Grass Cough    No Known Allergies        10 point ROS of systems were all negative except for pertinent positives noted in my HPI.      Exam:   /86 (BP Location: Left arm, Patient Position: Sitting, Cuff Size: Adult Large)   Pulse 82   Temp 98.2  F (36.8  C) (Tympanic)   Resp 16   Wt 93.4 kg (206 lb)    SpO2 95%   BMI 29.56 kg/m    Constitutional: healthy, alert and no distress  Head: Normocephalic, atraumatic.  Eyes: conjunctiva clear, no drainage  ENT: TMs clear and shiny maye, nasal mucosa pink and moist, throat without tonsillar hypertrophy or erythema.  Left-sided maxillary tenderness.  Neck: neck is supple, no cervical lymphadenopathy or nuchal rigidity  Cardiovascular: RRR  Respiratory: CTA bilaterally, no rhonchi or rales  Skin: no rashes  Neurologic: Speech clear, gait normal. Moves all extremities.    No results found for any visits on 03/15/24.

## 2024-03-28 ENCOUNTER — LAB (OUTPATIENT)
Dept: LAB | Facility: CLINIC | Age: 59
End: 2024-03-28
Payer: COMMERCIAL

## 2024-03-28 DIAGNOSIS — E78.5 HYPERLIPIDEMIA LDL GOAL <70: ICD-10-CM

## 2024-03-28 LAB
ALT SERPL W P-5'-P-CCNC: 37 U/L (ref 0–70)
CHOLEST SERPL-MCNC: 157 MG/DL
FASTING STATUS PATIENT QL REPORTED: YES
HDLC SERPL-MCNC: 37 MG/DL
LDLC SERPL CALC-MCNC: 98 MG/DL
NONHDLC SERPL-MCNC: 120 MG/DL
TRIGL SERPL-MCNC: 111 MG/DL

## 2024-03-28 PROCEDURE — 36415 COLL VENOUS BLD VENIPUNCTURE: CPT

## 2024-03-28 PROCEDURE — 80061 LIPID PANEL: CPT

## 2024-03-28 PROCEDURE — 84460 ALANINE AMINO (ALT) (SGPT): CPT

## 2024-06-10 ENCOUNTER — TRANSFERRED RECORDS (OUTPATIENT)
Dept: HEALTH INFORMATION MANAGEMENT | Facility: CLINIC | Age: 59
End: 2024-06-10
Payer: COMMERCIAL

## 2024-06-10 LAB — RETINOPATHY: NEGATIVE

## 2024-06-11 ENCOUNTER — TELEPHONE (OUTPATIENT)
Dept: FAMILY MEDICINE | Facility: CLINIC | Age: 59
End: 2024-06-11
Payer: COMMERCIAL

## 2024-06-11 NOTE — TELEPHONE ENCOUNTER
Patient Quality Outreach    Patient is due for the following:   Diabetes -  Eye Exam    Next Steps:   No follow up needed at this time.    Type of outreach:    Chart review performed, no outreach needed. and Patient has been seen with [facility] 06-10-24      Questions for provider review:    None           Diaan Blackburn MA

## 2024-08-06 ENCOUNTER — MYC MEDICAL ADVICE (OUTPATIENT)
Dept: FAMILY MEDICINE | Facility: CLINIC | Age: 59
End: 2024-08-06
Payer: COMMERCIAL

## 2024-08-06 DIAGNOSIS — R40.0 DAYTIME SLEEPINESS: Primary | ICD-10-CM

## 2024-09-23 DIAGNOSIS — E78.5 HYPERLIPIDEMIA LDL GOAL <70: ICD-10-CM

## 2024-09-23 RX ORDER — FENOFIBRATE 145 MG/1
145 TABLET, COATED ORAL DAILY
Qty: 90 TABLET | Refills: 3 | OUTPATIENT
Start: 2024-09-23

## 2024-10-13 DIAGNOSIS — R73.01 ELEVATED FASTING GLUCOSE: ICD-10-CM

## 2024-10-14 RX ORDER — METFORMIN HYDROCHLORIDE 500 MG/1
TABLET, EXTENDED RELEASE ORAL
Qty: 90 TABLET | Refills: 3 | OUTPATIENT
Start: 2024-10-14

## 2024-11-10 DIAGNOSIS — K21.00 GASTROESOPHAGEAL REFLUX DISEASE WITH ESOPHAGITIS WITHOUT HEMORRHAGE: ICD-10-CM

## 2024-11-11 RX ORDER — OMEPRAZOLE 40 MG/1
CAPSULE, DELAYED RELEASE ORAL
Qty: 90 CAPSULE | Refills: 0 | Status: SHIPPED | OUTPATIENT
Start: 2024-11-11

## 2024-11-16 DIAGNOSIS — E78.5 HYPERLIPIDEMIA LDL GOAL <70: ICD-10-CM

## 2024-11-18 RX ORDER — FENOFIBRATE 145 MG/1
145 TABLET, COATED ORAL DAILY
Qty: 90 TABLET | Refills: 0 | Status: SHIPPED | OUTPATIENT
Start: 2024-11-18

## 2024-11-21 ENCOUNTER — TRANSFERRED RECORDS (OUTPATIENT)
Dept: HEALTH INFORMATION MANAGEMENT | Facility: CLINIC | Age: 59
End: 2024-11-21
Payer: COMMERCIAL

## 2024-12-16 DIAGNOSIS — R39.11 BENIGN PROSTATIC HYPERPLASIA WITH URINARY HESITANCY: ICD-10-CM

## 2024-12-16 DIAGNOSIS — N40.1 BENIGN PROSTATIC HYPERPLASIA WITH URINARY HESITANCY: ICD-10-CM

## 2024-12-22 ENCOUNTER — HEALTH MAINTENANCE LETTER (OUTPATIENT)
Age: 59
End: 2024-12-22

## 2024-12-26 ENCOUNTER — OFFICE VISIT (OUTPATIENT)
Dept: CARDIOLOGY | Facility: CLINIC | Age: 59
End: 2024-12-26
Payer: COMMERCIAL

## 2024-12-26 VITALS
SYSTOLIC BLOOD PRESSURE: 115 MMHG | WEIGHT: 212.6 LBS | HEART RATE: 80 BPM | BODY MASS INDEX: 30.43 KG/M2 | DIASTOLIC BLOOD PRESSURE: 79 MMHG | HEIGHT: 70 IN

## 2024-12-26 DIAGNOSIS — E78.5 HYPERLIPIDEMIA LDL GOAL <70: ICD-10-CM

## 2024-12-26 DIAGNOSIS — R91.8 PULMONARY NODULES: ICD-10-CM

## 2024-12-26 DIAGNOSIS — Z82.49 FAMILY HISTORY OF ISCHEMIC HEART DISEASE: ICD-10-CM

## 2024-12-26 DIAGNOSIS — R93.1 ELEVATED CORONARY ARTERY CALCIUM SCORE: Primary | ICD-10-CM

## 2024-12-26 SDOH — HEALTH STABILITY: PHYSICAL HEALTH: ON AVERAGE, HOW MANY MINUTES DO YOU ENGAGE IN EXERCISE AT THIS LEVEL?: 30 MIN

## 2024-12-26 SDOH — HEALTH STABILITY: PHYSICAL HEALTH: ON AVERAGE, HOW MANY DAYS PER WEEK DO YOU ENGAGE IN MODERATE TO STRENUOUS EXERCISE (LIKE A BRISK WALK)?: 1 DAY

## 2024-12-26 ASSESSMENT — SOCIAL DETERMINANTS OF HEALTH (SDOH): HOW OFTEN DO YOU GET TOGETHER WITH FRIENDS OR RELATIVES?: ONCE A WEEK

## 2024-12-26 NOTE — PROGRESS NOTES
~Cardiology Clinic Visit~    Primary Cardiologist: Dr. Cox  Reason for visit: Annual visit.     He is a very pleasant 57 year old male with a past medical history notable for hyperlipidemia and mildly elevated CT coronary calcium score. He recently met with Dr. Cecilia Cox in cardiology consultation last month for evaluation of shortness of breath with exertion and occasional nagging left-sided chest pressure, both at rest and with stress with no radiation.      He has a family history of coronary artery disease in one of his brothers who suffered a heart attack in his 50s and 60s.  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     He underwent CT coronary calcium scoring on 5/5/2022 showing a total Agatston calcium score of 22.1, concentrated in the left anterior descending artery.  Incidentally noted were some small pulmonary nodules that were < 6 mm.     For further evaluation of his chest pressure and reassessment of the small pulmonary nodules given significant secondhand smoke exposure, Dr. Cox recommended an exercise nuclear stress test, echocardiogram, and repeat CT chest in about 12 months.     The exercise nuclear stress test was completed on 5/20/2022 showing average exercise capacity, 3/10 baseline chest pain was noted at the time of the test that did not change with exercise. No ischemic EKG changes were noted. The nuclear stress test was negative for inducible myocardial ischemia or infarction. The left ventricular ejection fraction at stress is greater than 70%. No changes when directly compared to nuclear stress from 2015.     In our follow up visits, Willard has been doing well.  He has no functional or exertional limitations.  His weight has slowly increased over the years.  Patient denies jostin CP, SOB, GUERRA, orthopnea, PND, LH or dizziness, pre-syncope, palpitations,  edema or swelling.  __________________________________________________________________         Assessment and Impression:     Mildly elevated coronary artery calcium score  Exertional dyspnea and chest pain with typical and atypical features  Family history of CAD in his brother  -No anginal symptoms.  -Echocardiogram with normal EF function 55-60%, no acute abnormalities.  -Is compliant with his medications.  -Currently on aspirin, rosuvastatin.     Hyperlipidemia  -Controlled  -On rosuvastatin.  -LDL goal < 100 mg/dl     Small pulmonary nodules with history of significant secondhand smoke exposure  -Remote monitoring; Follow with yearly CT for trends.         Recommendations and Plan:     No medication changes today.  Continue healthy lifestyle - aim for moderate intensity of 30 minutes, 5 days per week for cardiovascular health.  Incorporate a mediterranean style diet to facilitate healthy weight loss.  Counseled when to call clinic or seek care for anginal symptoms or equivalent.  He has zero as of today.  Follow up in 1-year for routine visit.  __________________________________________________________________    Thank you for the opportunity to participate in this pleasant patient's care.    We would be happy to see this patient sooner for any concerns in the meantime.    ANDREW Dee, CNP   Nurse Practitioner  [facility]    Today's clinic visit entailed:  Prescription drug management  The level of medical decision making during this visit was of moderate complexity.  Review of the result(s) of each unique test - cardiac testing, cardiac imaging, labs  Care everywhere reviewed for additional records to facilitate comprehensive patient care.    Orders this Visit:  Orders Placed This Encounter   Procedures    Lipid panel reflex to direct LDL Fasting    Follow-Up with Cardiology- WARNER     No orders of the defined types were placed in this encounter.    Medications Discontinued  "During This Encounter   Medication Reason    simvastatin (ZOCOR) 20 MG tablet Stopped by Patient (No AVS)     Encounter Diagnoses   Name Primary?    Elevated coronary artery calcium score Yes    Family history of ischemic heart disease     Pulmonary nodules     Hyperlipidemia LDL goal <100      CURRENT MEDICATIONS:  Current Outpatient Medications   Medication Sig Dispense Refill    aspirin 81 MG EC tablet Take 81 mg by mouth daily OTC      fenofibrate (TRICOR) 145 MG tablet TAKE 1 TABLET(145 MG) BY MOUTH DAILY 90 tablet 0    loratadine (CLARITIN) 10 MG tablet Take 10 mg by mouth daily      metFORMIN (GLUCOPHAGE XR) 500 MG 24 hr tablet TAKE 1 TABLET(500 MG) BY MOUTH DAILY WITH DINNER Strength: 500 mg 90 tablet 3    Omega-3 Fatty Acids (FISH OIL PO) Take 300 mg by mouth daily      omeprazole (PRILOSEC) 40 MG DR capsule TAKE 1 CAPSULE(40 MG) BY MOUTH DAILY 30 TO 60 MINUTES BEFORE A MEAL 90 capsule 0    rosuvastatin (CRESTOR) 20 MG tablet Take 1 tablet (20 mg) by mouth daily 90 tablet 3    tamsulosin (FLOMAX) 0.4 MG capsule Take 2 capsules (0.8 mg) by mouth daily 180 capsule 3    cyclobenzaprine (FLEXERIL) 5 MG tablet Take 1 tablet (5 mg) by mouth nightly as needed for muscle spasms (Patient not taking: Reported on 12/26/2024) 30 tablet 1    traZODone (DESYREL) 50 MG tablet  (Patient not taking: Reported on 12/26/2024)       ALLERGIES     Allergies   Allergen Reactions    Mold Shortness Of Breath    Cats      Congestion and drainage    Grass Cough    No Known Allergies      PAST MEDICAL, SURGICAL, FAMILY, SOCIAL HISTORY:  History was reviewed and updated as needed, see medical record.    Review of Systems:  A 10-point Review Of Systems is otherwise normal except for that which is noted in the HPI and interval summary.    Physical Exam:    Vitals: /79   Pulse 80   Ht 1.778 m (5' 10\")   Wt 96.4 kg (212 lb 9.6 oz)   BMI 30.50 kg/m    Constitutional: Appears stated age, well nourished, NAD.  Neck: Supple. Carotid " pulses full and equal.   Respiratory: Non-labored. Lungs CTAB.  Cardiovascular: RRR, normal S1 and S2. No M/G/R.  No edema.  GI: Soft, non-distended, non-tender.  Skin: Warm and dry.   Musculoskeletal/Extremities: Symmetrical movement.  Neurologic: No gross focal deficits. Alert, awake.  Psychiatric: Affect appropriate. Mentation normal.    Recent Lab Results:  LIPID RESULTS:  Lab Results   Component Value Date    CHOL 157 03/28/2024    CHOL 216 (H) 12/07/2020    HDL 37 (L) 03/28/2024    HDL 42 12/07/2020    LDL 98 03/28/2024     (H) 12/07/2020    TRIG 111 03/28/2024    TRIG 92 12/07/2020    CHOLHDLRATIO 4.7 08/20/2014     LIVER ENZYME RESULTS:  Lab Results   Component Value Date    AST 33 12/13/2023    AST 25 12/07/2020    ALT 37 03/28/2024    ALT 40 12/07/2020     CBC RESULTS:  Lab Results   Component Value Date    WBC 6.0 12/13/2023    WBC 5.2 12/07/2020    RBC 5.48 12/13/2023    RBC 5.34 12/07/2020    HGB 16.3 12/13/2023    HGB 15.8 12/07/2020    HCT 47.4 12/13/2023    HCT 46.5 12/07/2020    MCV 87 12/13/2023    MCV 87 12/07/2020    MCH 29.7 12/13/2023    MCH 29.6 12/07/2020    MCHC 34.4 12/13/2023    MCHC 34.0 12/07/2020    RDW 12.9 12/13/2023    RDW 13.4 12/07/2020     12/13/2023     12/07/2020     BMP RESULTS:  Lab Results   Component Value Date     12/13/2023     12/07/2020    POTASSIUM 4.2 12/13/2023    POTASSIUM 4.1 12/07/2020    CHLORIDE 101 12/13/2023    CHLORIDE 104 12/07/2020    CO2 28 12/13/2023    CO2 25 12/07/2020    ANIONGAP 11 12/13/2023    ANIONGAP 6 12/07/2020    GLC 92 12/13/2023    GLC 89 12/07/2020    BUN 22.3 12/13/2023    BUN 23 12/07/2020    CR 1.12 12/13/2023    CR 1.12 12/07/2020    GFRESTIMATED 75 12/13/2023    GFRESTIMATED 72 12/07/2020    GFRESTBLACK 84 12/07/2020    MAR 9.8 12/13/2023    MAR 9.1 12/07/2020      A1C RESULTS:  Lab Results   Component Value Date    A1C 5.9 (H) 12/13/2023    A1C 6.1 (H) 12/07/2020     INR RESULTS:  Lab Results   Component  Value Date    INR 1.05 06/14/2018    INR 1.03 06/07/2007

## 2024-12-26 NOTE — LETTER
12/26/2024    [facility]  [address]  [facility] [address]    RE: Willard Manzano       Dear Colleague,     I had the pleasure of seeing Willard Manzano in the [facility].              ~Cardiology Clinic Visit~    Primary Cardiologist: Dr. Cox  Reason for visit: Annual visit.     He is a very pleasant 57 year old male with a past medical history notable for hyperlipidemia and mildly elevated CT coronary calcium score. He recently met with Dr. Cecilia Cox in cardiology consultation last month for evaluation of shortness of breath with exertion and occasional nagging left-sided chest pressure, both at rest and with stress with no radiation.      He has a family history of coronary artery disease in one of his brothers who suffered a heart attack in his 50s and 60s.  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     He underwent CT coronary calcium scoring on 5/5/2022 showing a total Agatston calcium score of 22.1, concentrated in the left anterior descending artery.  Incidentally noted were some small pulmonary nodules that were < 6 mm.     For further evaluation of his chest pressure and reassessment of the small pulmonary nodules given significant secondhand smoke exposure, Dr. Cox recommended an exercise nuclear stress test, echocardiogram, and repeat CT chest in about 12 months.     The exercise nuclear stress test was completed on 5/20/2022 showing average exercise capacity, 3/10 baseline chest pain was noted at the time of the test that did not change with exercise. No ischemic EKG changes were noted. The nuclear stress test was negative for inducible myocardial ischemia or infarction. The left ventricular ejection fraction at stress is greater than 70%. No changes when directly compared to nuclear stress from 2015.     In our follow up  visits, Willard has been doing well.  He has no functional or exertional limitations.  His weight has slowly increased over the years.  Patient denies jostin CP, SOB, GUERRA, orthopnea, PND, LH or dizziness, pre-syncope, palpitations, edema or swelling.  __________________________________________________________________         Assessment and Impression:     Mildly elevated coronary artery calcium score  Exertional dyspnea and chest pain with typical and atypical features  Family history of CAD in his brother  -No anginal symptoms.  -Echocardiogram with normal EF function 55-60%, no acute abnormalities.  -Is compliant with his medications.  -Currently on aspirin, rosuvastatin.     Hyperlipidemia  -Controlled  -On rosuvastatin.  -LDL goal < 100 mg/dl     Small pulmonary nodules with history of significant secondhand smoke exposure  -Remote monitoring; Follow with yearly CT for trends.         Recommendations and Plan:     No medication changes today.  Continue healthy lifestyle - aim for moderate intensity of 30 minutes, 5 days per week for cardiovascular health.  Incorporate a mediterranean style diet to facilitate healthy weight loss.  Counseled when to call clinic or seek care for anginal symptoms or equivalent.  He has zero as of today.  Follow up in 1-year for routine visit.  __________________________________________________________________    Thank you for the opportunity to participate in this pleasant patient's care.    We would be happy to see this patient sooner for any concerns in the meantime.    ANDREW Dee, CNP   Nurse Practitioner  [facility]    Today's clinic visit entailed:  Prescription drug management  The level of medical decision making during this visit was of moderate complexity.  Review of the result(s) of each unique test - cardiac testing, cardiac imaging, labs  Care everywhere reviewed for additional records to facilitate comprehensive patient care.    Orders this  Visit:  Orders Placed This Encounter   Procedures     Lipid panel reflex to direct LDL Fasting     Follow-Up with Cardiology- WARNER     No orders of the defined types were placed in this encounter.    Medications Discontinued During This Encounter   Medication Reason     simvastatin (ZOCOR) 20 MG tablet Stopped by Patient (No AVS)     Encounter Diagnoses   Name Primary?     Elevated coronary artery calcium score Yes     Family history of ischemic heart disease      Pulmonary nodules      Hyperlipidemia LDL goal <100      CURRENT MEDICATIONS:  Current Outpatient Medications   Medication Sig Dispense Refill     aspirin 81 MG EC tablet Take 81 mg by mouth daily OTC       fenofibrate (TRICOR) 145 MG tablet TAKE 1 TABLET(145 MG) BY MOUTH DAILY 90 tablet 0     loratadine (CLARITIN) 10 MG tablet Take 10 mg by mouth daily       metFORMIN (GLUCOPHAGE XR) 500 MG 24 hr tablet TAKE 1 TABLET(500 MG) BY MOUTH DAILY WITH DINNER Strength: 500 mg 90 tablet 3     Omega-3 Fatty Acids (FISH OIL PO) Take 300 mg by mouth daily       omeprazole (PRILOSEC) 40 MG DR capsule TAKE 1 CAPSULE(40 MG) BY MOUTH DAILY 30 TO 60 MINUTES BEFORE A MEAL 90 capsule 0     rosuvastatin (CRESTOR) 20 MG tablet Take 1 tablet (20 mg) by mouth daily 90 tablet 3     tamsulosin (FLOMAX) 0.4 MG capsule Take 2 capsules (0.8 mg) by mouth daily 180 capsule 3     cyclobenzaprine (FLEXERIL) 5 MG tablet Take 1 tablet (5 mg) by mouth nightly as needed for muscle spasms (Patient not taking: Reported on 12/26/2024) 30 tablet 1     traZODone (DESYREL) 50 MG tablet  (Patient not taking: Reported on 12/26/2024)       ALLERGIES     Allergies   Allergen Reactions     Mold Shortness Of Breath     Cats      Congestion and drainage     Grass Cough     No Known Allergies      PAST MEDICAL, SURGICAL, FAMILY, SOCIAL HISTORY:  History was reviewed and updated as needed, see medical record.    Review of Systems:  A 10-point Review Of Systems is otherwise normal except for that which is  "noted in the HPI and interval summary.    Physical Exam:    Vitals: /79   Pulse 80   Ht 1.778 m (5' 10\")   Wt 96.4 kg (212 lb 9.6 oz)   BMI 30.50 kg/m    Constitutional: Appears stated age, well nourished, NAD.  Neck: Supple. Carotid pulses full and equal.   Respiratory: Non-labored. Lungs CTAB.  Cardiovascular: RRR, normal S1 and S2. No M/G/R.  No edema.  GI: Soft, non-distended, non-tender.  Skin: Warm and dry.   Musculoskeletal/Extremities: Symmetrical movement.  Neurologic: No gross focal deficits. Alert, awake.  Psychiatric: Affect appropriate. Mentation normal.    Recent Lab Results:  LIPID RESULTS:  Lab Results   Component Value Date    CHOL 157 03/28/2024    CHOL 216 (H) 12/07/2020    HDL 37 (L) 03/28/2024    HDL 42 12/07/2020    LDL 98 03/28/2024     (H) 12/07/2020    TRIG 111 03/28/2024    TRIG 92 12/07/2020    CHOLHDLRATIO 4.7 08/20/2014     LIVER ENZYME RESULTS:  Lab Results   Component Value Date    AST 33 12/13/2023    AST 25 12/07/2020    ALT 37 03/28/2024    ALT 40 12/07/2020     CBC RESULTS:  Lab Results   Component Value Date    WBC 6.0 12/13/2023    WBC 5.2 12/07/2020    RBC 5.48 12/13/2023    RBC 5.34 12/07/2020    HGB 16.3 12/13/2023    HGB 15.8 12/07/2020    HCT 47.4 12/13/2023    HCT 46.5 12/07/2020    MCV 87 12/13/2023    MCV 87 12/07/2020    MCH 29.7 12/13/2023    MCH 29.6 12/07/2020    MCHC 34.4 12/13/2023    MCHC 34.0 12/07/2020    RDW 12.9 12/13/2023    RDW 13.4 12/07/2020     12/13/2023     12/07/2020     BMP RESULTS:  Lab Results   Component Value Date     12/13/2023     12/07/2020    POTASSIUM 4.2 12/13/2023    POTASSIUM 4.1 12/07/2020    CHLORIDE 101 12/13/2023    CHLORIDE 104 12/07/2020    CO2 28 12/13/2023    CO2 25 12/07/2020    ANIONGAP 11 12/13/2023    ANIONGAP 6 12/07/2020    GLC 92 12/13/2023    GLC 89 12/07/2020    BUN 22.3 12/13/2023    BUN 23 12/07/2020    CR 1.12 12/13/2023    CR 1.12 12/07/2020    GFRESTIMATED 75 12/13/2023    " GFRESTIMATED 72 12/07/2020    GFRESTBLACK 84 12/07/2020    MAR 9.8 12/13/2023    MAR 9.1 12/07/2020      A1C RESULTS:  Lab Results   Component Value Date    A1C 5.9 (H) 12/13/2023    A1C 6.1 (H) 12/07/2020     INR RESULTS:  Lab Results   Component Value Date    INR 1.05 06/14/2018    INR 1.03 06/07/2007                     Thank you for allowing me to participate in the care of your patient.      Sincerely,     ANDREW Dee [facility]  cc:   No referring provider defined for this encounter.

## 2024-12-31 ENCOUNTER — ANCILLARY PROCEDURE (OUTPATIENT)
Dept: GENERAL RADIOLOGY | Facility: CLINIC | Age: 59
End: 2024-12-31
Payer: COMMERCIAL

## 2024-12-31 ENCOUNTER — OFFICE VISIT (OUTPATIENT)
Dept: FAMILY MEDICINE | Facility: CLINIC | Age: 59
End: 2024-12-31
Payer: COMMERCIAL

## 2024-12-31 VITALS
SYSTOLIC BLOOD PRESSURE: 138 MMHG | HEART RATE: 82 BPM | DIASTOLIC BLOOD PRESSURE: 85 MMHG | WEIGHT: 211 LBS | BODY MASS INDEX: 30.21 KG/M2 | HEIGHT: 70 IN | TEMPERATURE: 97.8 F | OXYGEN SATURATION: 96 % | RESPIRATION RATE: 20 BRPM

## 2024-12-31 DIAGNOSIS — K21.00 GASTROESOPHAGEAL REFLUX DISEASE WITH ESOPHAGITIS WITHOUT HEMORRHAGE: ICD-10-CM

## 2024-12-31 DIAGNOSIS — E78.5 HYPERLIPIDEMIA LDL GOAL <70: ICD-10-CM

## 2024-12-31 DIAGNOSIS — M79.672 LEFT FOOT PAIN: ICD-10-CM

## 2024-12-31 DIAGNOSIS — Z00.00 ROUTINE GENERAL MEDICAL EXAMINATION AT A HEALTH CARE FACILITY: Primary | ICD-10-CM

## 2024-12-31 DIAGNOSIS — L98.9 ARM SKIN LESION, LEFT: ICD-10-CM

## 2024-12-31 DIAGNOSIS — Z12.5 SCREENING FOR PROSTATE CANCER: ICD-10-CM

## 2024-12-31 DIAGNOSIS — E11.9 TYPE 2 DIABETES MELLITUS WITHOUT COMPLICATION, WITHOUT LONG-TERM CURRENT USE OF INSULIN (H): ICD-10-CM

## 2024-12-31 LAB
ALBUMIN SERPL BCG-MCNC: 4.5 G/DL (ref 3.5–5.2)
ALP SERPL-CCNC: 36 U/L (ref 40–150)
ALT SERPL W P-5'-P-CCNC: 34 U/L (ref 0–70)
ANION GAP SERPL CALCULATED.3IONS-SCNC: 11 MMOL/L (ref 7–15)
AST SERPL W P-5'-P-CCNC: 25 U/L (ref 0–45)
BILIRUB SERPL-MCNC: 0.6 MG/DL
BUN SERPL-MCNC: 16.5 MG/DL (ref 8–23)
CALCIUM SERPL-MCNC: 9.7 MG/DL (ref 8.8–10.4)
CHLORIDE SERPL-SCNC: 104 MMOL/L (ref 98–107)
CHOLEST SERPL-MCNC: 184 MG/DL
CREAT SERPL-MCNC: 1.29 MG/DL (ref 0.67–1.17)
EGFRCR SERPLBLD CKD-EPI 2021: 63 ML/MIN/1.73M2
ERYTHROCYTE [DISTWIDTH] IN BLOOD BY AUTOMATED COUNT: 13.1 % (ref 10–15)
EST. AVERAGE GLUCOSE BLD GHB EST-MCNC: 143 MG/DL
GLUCOSE SERPL-MCNC: 102 MG/DL (ref 70–99)
HBA1C MFR BLD: 6.6 % (ref 0–5.6)
HCO3 SERPL-SCNC: 27 MMOL/L (ref 22–29)
HCT VFR BLD AUTO: 47.9 % (ref 40–53)
HDLC SERPL-MCNC: 41 MG/DL
HGB BLD-MCNC: 16.3 G/DL (ref 13.3–17.7)
HOLD SPECIMEN: NORMAL
HOLD SPECIMEN: NORMAL
LDLC SERPL CALC-MCNC: 113 MG/DL
MCH RBC QN AUTO: 29.7 PG (ref 26.5–33)
MCHC RBC AUTO-ENTMCNC: 34 G/DL (ref 31.5–36.5)
MCV RBC AUTO: 87 FL (ref 78–100)
NONHDLC SERPL-MCNC: 143 MG/DL
PLATELET # BLD AUTO: 301 10E3/UL (ref 150–450)
POTASSIUM SERPL-SCNC: 4.9 MMOL/L (ref 3.4–5.3)
PROT SERPL-MCNC: 7.3 G/DL (ref 6.4–8.3)
PSA SERPL DL<=0.01 NG/ML-MCNC: 2.35 NG/ML (ref 0–4.5)
RBC # BLD AUTO: 5.49 10E6/UL (ref 4.4–5.9)
SODIUM SERPL-SCNC: 142 MMOL/L (ref 135–145)
TRIGL SERPL-MCNC: 152 MG/DL
WBC # BLD AUTO: 4.2 10E3/UL (ref 4–11)

## 2024-12-31 PROCEDURE — 80061 LIPID PANEL: CPT

## 2024-12-31 PROCEDURE — 73630 X-RAY EXAM OF FOOT: CPT | Mod: TC | Performed by: RADIOLOGY

## 2024-12-31 PROCEDURE — 99396 PREV VISIT EST AGE 40-64: CPT | Mod: 25

## 2024-12-31 PROCEDURE — 36415 COLL VENOUS BLD VENIPUNCTURE: CPT

## 2024-12-31 PROCEDURE — 83036 HEMOGLOBIN GLYCOSYLATED A1C: CPT

## 2024-12-31 PROCEDURE — 80053 COMPREHEN METABOLIC PANEL: CPT

## 2024-12-31 PROCEDURE — 90673 RIV3 VACCINE NO PRESERV IM: CPT

## 2024-12-31 PROCEDURE — 90471 IMMUNIZATION ADMIN: CPT

## 2024-12-31 PROCEDURE — G0103 PSA SCREENING: HCPCS

## 2024-12-31 PROCEDURE — 99214 OFFICE O/P EST MOD 30 MIN: CPT | Mod: 25

## 2024-12-31 PROCEDURE — 85027 COMPLETE CBC AUTOMATED: CPT

## 2024-12-31 RX ORDER — FENOFIBRATE 145 MG/1
145 TABLET, COATED ORAL DAILY
Qty: 90 TABLET | Refills: 3 | Status: SHIPPED | OUTPATIENT
Start: 2024-12-31

## 2024-12-31 RX ORDER — ROSUVASTATIN CALCIUM 20 MG/1
20 TABLET, COATED ORAL DAILY
Qty: 90 TABLET | Refills: 3 | Status: SHIPPED | OUTPATIENT
Start: 2024-12-31

## 2024-12-31 RX ORDER — OMEPRAZOLE 40 MG/1
CAPSULE, DELAYED RELEASE ORAL
Qty: 90 CAPSULE | Refills: 3 | Status: SHIPPED | OUTPATIENT
Start: 2024-12-31

## 2024-12-31 RX ORDER — METFORMIN HYDROCHLORIDE 500 MG/1
1000 TABLET, EXTENDED RELEASE ORAL
Qty: 180 TABLET | Refills: 1 | Status: SHIPPED | OUTPATIENT
Start: 2024-12-31 | End: 2025-01-02

## 2024-12-31 NOTE — PROGRESS NOTES
Preventive Care Visit  [facility]  ANDREW Castro CNP, Family Medicine  Dec 31, 2024      Assessment & Plan     (Z00.00) Routine general medical examination at a health care facility  (primary encounter diagnosis)  Comment: Reviewed health maintenance/screening services guidelines/recommendations as well as vaccination recommendations as indicated which Willard understands. Services ordered after shared decision making agreed upon. Recommended healthy habits/diet and exercise.    Plan: PSA, screen, CBC with platelets, Comprehensive         metabolic panel (BMP + Alb, Alk Phos, ALT, AST,        Total. Bili, TP), Lipid panel reflex to direct         LDL Non-fasting    (E11.9) Type 2 diabetes mellitus without complication, without long-term current use of insulin (H)  Comment: Newly diagnosed medical condition. A1c was 6.6 today. Will increase metformin XR to 1000 mg daily. Recommended continuing to work on therapeutic lifestyle changes including a heart healthy diet, regular physical activity and weight management/loss efforts.  Follow-up in 6 months, sooner as needed.  Plan: HEMOGLOBIN A1C, Comprehensive metabolic panel         (BMP + Alb, Alk Phos, ALT, AST, Total. Bili,         TP), metFORMIN (GLUCOPHAGE XR) 500 MG 24 hr         tablet    (K21.00) Gastroesophageal reflux disease with esophagitis without hemorrhage  Comment: Chronic stable medical condition; continue present management on current treatment regimen of omeprazole 40 mg daily; refilled.   Plan: omeprazole (PRILOSEC) 40 MG DR capsule    (L98.9) Arm skin lesion, left  Comment: History of skin cancer. Patient needs to be seen by dermatology for further evaluation. Referral placed.  Plan: Adult Dermatology  Referral    (M79.736) Left foot pain  Comment: Unclear etiology. Tendonitis versus arthritis versus shoes. X-ray ordered. Recommended new shoes to see if pain improves. Will consider podiatry referral if no  "improvement.   Plan: XR Foot Left G/E 3 Views    (E78.5) Hyperlipidemia LDL goal <70  Comment: Chronic medical condition due for monitoring. Followed by cardiology.   Plan: Lipid panel reflex to direct LDL Non-fasting,         fenofibrate (TRICOR) 145 MG tablet,         rosuvastatin (CRESTOR) 20 MG tablet    (Z12.5) Screening for prostate cancer  Plan: PSA, screen         BMI  Estimated body mass index is 30.28 kg/m  as calculated from the following:    Height as of this encounter: 1.778 m (5' 10\").    Weight as of this encounter: 95.7 kg (211 lb).   Weight management plan: Discussed healthy diet and exercise guidelines    Counseling  Appropriate preventive services were addressed with this patient via screening, questionnaire, or discussion as appropriate for fall prevention, nutrition, physical activity, Tobacco-use cessation, social engagement, weight loss and cognition.  Checklist reviewing preventive services available has been given to the patient.  Reviewed patient's diet, addressing concerns and/or questions.   He is at risk for lack of exercise and has been provided with information to increase physical activity for the benefit of his well-being.     Guanako Lamar is a 59 year old, presenting for the following:  Physical        12/31/2024    12:58 PM   Additional Questions   Roomed by Mattie BLANCAS  Patient presents to the clinic today for an annual physical exam and to follow-up on his chronic medical conditions.     Left arm lesion- Patient complains of a left lower arm lesion with mass. Patient reports that he has a history of skin cancer but has not been to the dermatologist for a long time.    Bilateral foot pain - c/o bilateral dorsal foot pain, left greater than right. Denies injury.     Health Care Directive  Patient does not have a Health Care Directive: Discussed advance care planning with patient; information given to patient to review.      12/26/2024   General Health   How would you " rate your overall physical health? Good   Feel stress (tense, anxious, or unable to sleep) Only a little   (!) STRESS CONCERN      12/26/2024   Nutrition   Three or more servings of calcium each day? Yes   Diet: I don't know   How many servings of fruit and vegetables per day? (!) 2-3   How many sweetened beverages each day? 0-1         12/26/2024   Exercise   Days per week of moderate/strenous exercise 1 day   Average minutes spent exercising at this level 30 min   (!) EXERCISE CONCERN      12/26/2024   Social Factors   Frequency of gathering with friends or relatives Once a week   Worry food won't last until get money to buy more No   Food not last or not have enough money for food? No   Do you have housing? (Housing is defined as stable permanent housing and does not include staying ouside in a car, in a tent, in an abandoned building, in an overnight shelter, or couch-surfing.) Yes   Are you worried about losing your housing? No   Lack of transportation? No   Unable to get utilities (heat,electricity)? No         12/26/2024   Fall Risk   Fallen 2 or more times in the past year? No   Trouble with walking or balance? No          12/26/2024   Dental   Dentist two times every year? Yes         12/11/2024   TB Screening   Were you born outside of the [place]? No           Today's PHQ-2 Score:       1/8/2024     4:02 PM   PHQ-2 ( 1999 [employer])   Q1: Little interest or pleasure in doing things 0   Q2: Feeling down, depressed or hopeless 0   PHQ-2 Score 0         12/26/2024   Substance Use   Alcohol more than 3/day or more than 7/wk No   Do you use any other substances recreationally? No     Social History     Tobacco Use    Smoking status: Never    Smokeless tobacco: Never   Vaping Use    Vaping status: Never Used   Substance Use Topics    Alcohol use: Yes     Comment: 1-2 drinks a week    Drug use: No           12/26/2024   STI Screening   New sexual partner(s) since last STI/HIV test? No   Last PSA:   PSA   Date Value Ref  "Range Status   12/07/2020 0.87 0 - 4 ug/L Final     Comment:     Assay Method:  Chemiluminescence using Siemens Vista analyzer     Prostate Specific Antigen Screen   Date Value Ref Range Status   12/13/2023 0.81 0.00 - 4.50 ng/mL Final   02/18/2022 1.05 0.00 - 4.00 ug/L Final     ASCVD Risk   The 10-year ASCVD risk score (Jordin ADLER, et al., 2019) is: 19.4%    Values used to calculate the score:      Age: 59 years      Sex: Male      Is Non- : No      Diabetic: Yes      Tobacco smoker: No      Systolic Blood Pressure: 138 mmHg      Is BP treated: No      HDL Cholesterol: 37 mg/dL      Total Cholesterol: 157 mg/dL         Reviewed and updated as needed this visit by Provider   Tobacco  Allergies  Meds  Problems  Med Hx  Surg Hx  Fam Hx             Objective    Exam  /85 (BP Location: Right arm, Patient Position: Chair, Cuff Size: Adult Regular)   Pulse 82   Temp 97.8  F (36.6  C) (Oral)   Resp 20   Ht 1.778 m (5' 10\")   Wt 95.7 kg (211 lb)   SpO2 96%   BMI 30.28 kg/m     Estimated body mass index is 30.28 kg/m  as calculated from the following:    Height as of this encounter: 1.778 m (5' 10\").    Weight as of this encounter: 95.7 kg (211 lb).    Physical Exam  GENERAL: alert and no distress  EYES: Eyes grossly normal to inspection, PERRL and conjunctivae and sclerae normal  HENT: ear canals and TM's normal, nose and mouth without ulcers or lesions  NECK: no adenopathy, no asymmetry, masses, or scars  RESP: lungs clear to auscultation - no rales, rhonchi or wheezes  CV: regular rate and rhythm, normal S1 S2, no S3 or S4, no murmur, click or rub, no peripheral edema  ABDOMEN: soft, nontender, no hepatosplenomegaly, no masses and bowel sounds normal  MS: no gross musculoskeletal defects noted, no edema  SKIN: no suspicious lesions or rashes  NEURO: Normal strength and tone, mentation intact and speech normal  PSYCH: mentation appears normal, affect " normal/bright        Signed Electronically by: ANDREW Castro CNP

## 2024-12-31 NOTE — PATIENT INSTRUCTIONS
[facility] - Blog   Patient Education   Preventive Care Advice   This is general advice given by our system to help you stay healthy. However, your care team may have specific advice just for you. Please talk to your care team about your preventive care needs.  Nutrition  Eat 5 or more servings of fruits and vegetables each day.  Try wheat bread, brown rice and whole grain pasta (instead of white bread, rice, and pasta).  Get enough calcium and vitamin D. Check the label on foods and aim for 100% of the RDA (recommended daily allowance).  Lifestyle  Exercise at least 150 minutes each week  (30 minutes a day, 5 days a week).  Do muscle strengthening activities 2 days a week. These help control your weight and prevent disease.  No smoking.  Wear sunscreen to prevent skin cancer.  Have a dental exam and cleaning every 6 months.  Yearly exams  See your health care team every year to talk about:  Any changes in your health.  Any medicines your care team has prescribed.  Preventive care, family planning, and ways to prevent chronic diseases.  Shots (vaccines)   HPV shots (up to age 26), if you've never had them before.  Hepatitis B shots (up to age 59), if you've never had them before.  COVID-19 shot: Get this shot when it's due.  Flu shot: Get a flu shot every year.  Tetanus shot: Get a tetanus shot every 10 years.  Pneumococcal, hepatitis A, and RSV shots: Ask your care team if you need these based on your risk.  Shingles shot (for age 50 and up)  General health tests  Diabetes screening:  Starting at age 35, Get screened for diabetes at least every 3 years.  If you are younger than age 35, ask your care team if you should be screened for diabetes.  Cholesterol test: At age 39, start having a cholesterol test every 5 years, or more often if advised.  Bone density scan (DEXA): At age 50, ask your care team if you should have this scan for osteoporosis (brittle bones).  Hepatitis C: Get tested at least once in  your life.  STIs (sexually transmitted infections)  Before age 24: Ask your care team if you should be screened for STIs.  After age 24: Get screened for STIs if you're at risk. You are at risk for STIs (including HIV) if:  You are sexually active with more than one person.  You don't use condoms every time.  You or a partner was diagnosed with a sexually transmitted infection.  If you are at risk for HIV, ask about PrEP medicine to prevent HIV.  Get tested for HIV at least once in your life, whether you are at risk for HIV or not.  Cancer screening tests  Cervical cancer screening: If you have a cervix, begin getting regular cervical cancer screening tests starting at age 21.  Breast cancer scan (mammogram): If you've ever had breasts, begin having regular mammograms starting at age 40. This is a scan to check for breast cancer.  Colon cancer screening: It is important to start screening for colon cancer at age 45.  Have a colonoscopy test every 10 years (or more often if you're at risk) Or, ask your provider about stool tests like a FIT test every year or Cologuard test every 3 years.  To learn more about your testing options, visit:   .  For help making a decision, visit:   https://bit.ly/kg44906.  Prostate cancer screening test: If you have a prostate, ask your care team if a prostate cancer screening test (PSA) at age 55 is right for you.  Lung cancer screening: If you are a current or former smoker ages 50 to 80, ask your care team if ongoing lung cancer screenings are right for you.  For informational purposes only. Not to replace the advice of your health care provider. Copyright   2023 [employer]. All rights reserved. Clinically reviewed by the [facility] Transitions Program. [employer] [id number] - REV 01/24.

## 2025-01-02 ENCOUNTER — TELEPHONE (OUTPATIENT)
Dept: DERMATOLOGY | Facility: CLINIC | Age: 60
End: 2025-01-02
Payer: COMMERCIAL

## 2025-01-02 DIAGNOSIS — N28.9 FUNCTION KIDNEY DECREASED: Primary | ICD-10-CM

## 2025-01-02 RX ORDER — METFORMIN HYDROCHLORIDE 500 MG/1
1000 TABLET, EXTENDED RELEASE ORAL
Qty: 180 TABLET | Refills: 1 | Status: SHIPPED | OUTPATIENT
Start: 2025-01-02

## 2025-01-02 NOTE — TELEPHONE ENCOUNTER
This encounter is being sent to inform the clinic that this patient has a referral from Alissa Wolf APRN CNP in [facility] for the diagnoses of Arm skin lesion, left; Skin Lesion and has requested that this patient be seen within Priority: 1-2 Weeks and/or with Priority: 1-2 Weeks. Based on the availability of our provider(s), we are unable to accommodate this request.    Were all sites offered this patient?  Yes  Pt requesting OX in [place] only please due to where he lives - Thanks!  Does scheduling algorithm request to schedule next available?  Patient has been scheduled for the first available opening with Katja Chun PA-C at  in [place] on 07/02/2025.  We have informed the patient that the clinic will review their referral and reach out if a sooner appointment is medically necessary.       Referral Order in Epic  Records in [facility]  Records with [facility] in [place]    Please review and call Pt if you can move up his Appt    Thank you!

## 2025-01-02 NOTE — TELEPHONE ENCOUNTER
Called patient and scheduled a spot only check with Dr. Dave.    Thank you,    Tracey BENNETTRN BSN  [facility]- [phone]

## 2025-01-08 ENCOUNTER — OFFICE VISIT (OUTPATIENT)
Dept: DERMATOLOGY | Facility: CLINIC | Age: 60
End: 2025-01-08
Payer: COMMERCIAL

## 2025-01-08 DIAGNOSIS — L98.9 ARM SKIN LESION, LEFT: ICD-10-CM

## 2025-01-08 DIAGNOSIS — D22.9 MULTIPLE BENIGN NEVI: ICD-10-CM

## 2025-01-08 DIAGNOSIS — D48.5 NEOPLASM OF UNCERTAIN BEHAVIOR OF SKIN: ICD-10-CM

## 2025-01-08 DIAGNOSIS — L57.8 ACTINIC SKIN DAMAGE: ICD-10-CM

## 2025-01-08 DIAGNOSIS — L81.4 LENTIGINES: Primary | ICD-10-CM

## 2025-01-08 PROCEDURE — 99203 OFFICE O/P NEW LOW 30 MIN: CPT | Performed by: STUDENT IN AN ORGANIZED HEALTH CARE EDUCATION/TRAINING PROGRAM

## 2025-01-08 NOTE — LETTER
1/8/2025      Willard Manzano  [address] [facility] [address]      Dear Colleague,    Thank you for referring your patient, Willard Manzano, to the [facility]. Please see a copy of my visit note below.    [facility] Dermatology Note    Encounter Date: Jan 8, 2025    Dermatology Problem List:    ______________________________________    Impression/Plan:  Willard was seen today for derm problem.    Diagnoses and all orders for this visit:        Arm skin lesion, left  -     Adult Dermatology  Referral    Actinic skin damage  Multiple benign nevi  Lentigines  - Reviewed the compounding benefits of incremental changes to sun protective clothing behaviors including increased frequency of sunscreen and sun protective clothing like broad brimmed hats and longsleeved UPF containing clothing    Neoplasm of uncertain behavior of skin  - subcutaneous papule about 4mm, possible lipoma vs other. Soft round, well demarcated not fixed  - benign          Follow-up PRN.       Staff Involved:  Staff Only    Chris Dave MD   of Dermatology  Department of Dermatology  [facility]      CC:   Chief Complaint   Patient presents with     Derm Problem     Lesion on left forearm. Patient states is a nodule and feel like bruise. Lesion is been present since 3-4 month.        History of Present Illness:  Mr. Willard Manzano is a 59 year old male who presents as a new patient.    Lesion on L forearm is bruiseline present for 3-4 mo     Labs:      Physical exam:  Vitals: There were no vitals taken for this visit.  GEN: well developed, well-nourished, in no acute distress, in a pleasant mood.     SKIN: Morris phototype 1  - Sun-exposed skin, which includes the head/face, neck, both arms, digits, and/or nails was examined.   - Flat brown macules and patches in a sun exposed areas on face and extremities  -  brown macule w/ adjacent  subcutaneous papule ~3-4mm in size   - No other lesions of concern on areas examined.     Past Medical History:   Past Medical History:   Diagnosis Date     Arthritis 2011     Basal cell carcinoma      Blood clotting disorder      Cancer (H)     Skin     Chiari malformation type I (H)      Elevated fasting glucose 2012     Headaches, migraine      Headaches, migraine      Other and unspecified hyperlipidemia      Pedal edema 06/10/2021     Rotator cuff tear 2012     Rotator cuff tear 2012     Rotator cuff tendinitis 2011    Inj. Dr. Cheney.      Spinal headache 2007    spontaneous spinal fluid leak - 2 blood patches     Urethral polyp 2010    Dr. Card     Urethral polyp 2010    Dr. Card      Varicose veins of both lower extremities 06/10/2021     Past Surgical History:   Procedure Laterality Date     ABDOMEN SURGERY  2006    Umbilical Hernia repair     BIOPSY      arms cancer, 2 spots     COLONOSCOPY  2010    non-adenomatous polyp. Advised 5 yr f/u.      COLONOSCOPY N/A 2021    Procedure: COLONOSCOPY;  Surgeon: Broderick Rivera MD;  Location: [facility]     HC REMOVAL OF TONSILS,<11 Y/O       HERNIA REPAIR       ORTHOPEDIC SURGERY  2018    Rotator Cuff Repair     SOFT TISSUE SURGERY      Left thumb     SURGICAL HISTORY OF -   2006    umbilical hernia repair with mesh     ZZHC COLONOSCOPY THRU STOMA, DIAGNOSTIC  2008    normal. 3 yr f/u due to brother CA age 43.     ZZHC VASECTOMY UNILAT/BILAT W POSTOP SEMEN         Social History:   reports that he has never smoked. He has never used smokeless tobacco. He reports current alcohol use. He reports that he does not use drugs.    Family History:  Family History   Problem Relation Age of Onset     Diabetes Mother         dx in her 40's. overwt. Now IDDM.     Hypertension Mother      Lipids Mother      Gallbladder Disease Mother      Cancer Father          lung CA with brain mets age 65     Other  Cancer Father         Lung     Substance Abuse Father         Alcoholism     Other Cancer Brother         Lung     Cardiovascular Brother         Loi. Brother MI at age 49. Had another stent at age 52, Bypass 2022.     Colon Cancer Brother         Diagnosed at age 40s     Other Cancer Brother         Lung     Diabetes Sister      Family History Negative Sister      Breast Cancer Sister         Dx bilateral breast CA at age 56.      Other Cancer Sister         Lung     Breast Cancer Sister         (pronounced Muh-jay)     Allergies Son      Substance Abuse Son      Family History Negative Son      Substance Abuse Son         Alcoholism     Family History Negative Daughter        Medications:  Current Outpatient Medications   Medication Sig Dispense Refill     aspirin 81 MG EC tablet Take 81 mg by mouth daily OTC       fenofibrate (TRICOR) 145 MG tablet Take 1 tablet (145 mg) by mouth daily. 90 tablet 3     loratadine (CLARITIN) 10 MG tablet Take 10 mg by mouth daily       metFORMIN (GLUCOPHAGE XR) 500 MG 24 hr tablet Take 2 tablets (1,000 mg) by mouth daily (with dinner). 180 tablet 1     Omega-3 Fatty Acids (FISH OIL PO) Take 300 mg by mouth daily       omeprazole (PRILOSEC) 40 MG DR capsule TAKE 1 CAPSULE(40 MG) BY MOUTH DAILY 30 TO 60 MINUTES BEFORE A MEAL 90 capsule 3     rosuvastatin (CRESTOR) 20 MG tablet Take 1 tablet (20 mg) by mouth daily. 90 tablet 3     Allergies   Allergen Reactions     Mold Shortness Of Breath     Cats      Congestion and drainage     Grass Cough     No Known Allergies                Again, thank you for allowing me to participate in the care of your patient.        Sincerely,        Chris Dave MD    Electronically signed

## 2025-01-08 NOTE — PROGRESS NOTES
[facility] Dermatology Note    Encounter Date: Jan 8, 2025    Dermatology Problem List:    ______________________________________    Impression/Plan:  Willard was seen today for derm problem.    Diagnoses and all orders for this visit:        Arm skin lesion, left  -     Adult Dermatology  Referral    Actinic skin damage  Multiple benign nevi  Lentigines  - Reviewed the compounding benefits of incremental changes to sun protective clothing behaviors including increased frequency of sunscreen and sun protective clothing like broad brimmed hats and longsleeved UPF containing clothing    Neoplasm of uncertain behavior of skin  - subcutaneous papule about 4mm, possible lipoma vs other. Soft round, well demarcated not fixed  - benign          Follow-up PRN.       Staff Involved:  Staff Only    Chris Dave MD   of Dermatology  Department of Dermatology  [facility]      CC:   Chief Complaint   Patient presents with    Derm Problem     Lesion on left forearm. Patient states is a nodule and feel like bruise. Lesion is been present since 3-4 month.        History of Present Illness:  Mr. Willard Manzano is a 59 year old male who presents as a new patient.    Lesion on L forearm is bruiseline present for 3-4 mo     Labs:      Physical exam:  Vitals: There were no vitals taken for this visit.  GEN: well developed, well-nourished, in no acute distress, in a pleasant mood.     SKIN: Morris phototype 1  - Sun-exposed skin, which includes the head/face, neck, both arms, digits, and/or nails was examined.   - Flat brown macules and patches in a sun exposed areas on face and extremities  -  brown macule w/ adjacent subcutaneous papule ~3-4mm in size   - No other lesions of concern on areas examined.     Past Medical History:   Past Medical History:   Diagnosis Date    Arthritis 01/2011    Basal cell carcinoma     Blood clotting disorder     Cancer (H)      Skin    Chiari malformation type I (H)     Elevated fasting glucose 2012    Headaches, migraine     Headaches, migraine     Other and unspecified hyperlipidemia     Pedal edema 06/10/2021    Rotator cuff tear 2012    Rotator cuff tear 2012    Rotator cuff tendinitis 2011    Inj. Dr. Cheney.     Spinal headache 2007    spontaneous spinal fluid leak - 2 blood patches    Urethral polyp 2010    Dr. Card    Urethral polyp 2010    Dr. Card     Varicose veins of both lower extremities 06/10/2021     Past Surgical History:   Procedure Laterality Date    ABDOMEN SURGERY  2006    Umbilical Hernia repair    BIOPSY      arms cancer, 2 spots    COLONOSCOPY  2010    non-adenomatous polyp. Advised 5 yr f/u.     COLONOSCOPY N/A 2021    Procedure: COLONOSCOPY;  Surgeon: Broderick Rivera MD;  Location: [facility]    HC REMOVAL OF TONSILS,<11 Y/O      HERNIA REPAIR      ORTHOPEDIC SURGERY      Rotator Cuff Repair    SOFT TISSUE SURGERY      Left thumb    SURGICAL HISTORY OF -   2006    umbilical hernia repair with mesh    ZZHC COLONOSCOPY THRU STOMA, DIAGNOSTIC  2008    normal. 3 yr f/u due to brother CA age 43.    ZZHC VASECTOMY UNILAT/BILAT W POSTOP SEMEN         Social History:   reports that he has never smoked. He has never used smokeless tobacco. He reports current alcohol use. He reports that he does not use drugs.    Family History:  Family History   Problem Relation Age of Onset    Diabetes Mother         dx in her 40's. overwt. Now IDDM.    Hypertension Mother     Lipids Mother     Gallbladder Disease Mother     Cancer Father          lung CA with brain mets age 65    Other Cancer Father         Lung    Substance Abuse Father         Alcoholism    Other Cancer Brother         Lung    Cardiovascular Brother         Loi. Brother MI at age 49. Had another stent at age 52, Bypass .    Colon Cancer Brother         Diagnosed at age 40s    Other Cancer  Brother         Lung    Diabetes Sister     Family History Negative Sister     Breast Cancer Sister         Dx bilateral breast CA at age 56.     Other Cancer Sister         Lung    Breast Cancer Sister         (pronounced Muh-jay)    Allergies Son     Substance Abuse Son     Family History Negative Son     Substance Abuse Son         Alcoholism    Family History Negative Daughter        Medications:  Current Outpatient Medications   Medication Sig Dispense Refill    aspirin 81 MG EC tablet Take 81 mg by mouth daily OTC      fenofibrate (TRICOR) 145 MG tablet Take 1 tablet (145 mg) by mouth daily. 90 tablet 3    loratadine (CLARITIN) 10 MG tablet Take 10 mg by mouth daily      metFORMIN (GLUCOPHAGE XR) 500 MG 24 hr tablet Take 2 tablets (1,000 mg) by mouth daily (with dinner). 180 tablet 1    Omega-3 Fatty Acids (FISH OIL PO) Take 300 mg by mouth daily      omeprazole (PRILOSEC) 40 MG DR capsule TAKE 1 CAPSULE(40 MG) BY MOUTH DAILY 30 TO 60 MINUTES BEFORE A MEAL 90 capsule 3    rosuvastatin (CRESTOR) 20 MG tablet Take 1 tablet (20 mg) by mouth daily. 90 tablet 3     Allergies   Allergen Reactions    Mold Shortness Of Breath    Cats      Congestion and drainage    Grass Cough    No Known Allergies

## 2025-01-16 ENCOUNTER — MYC MEDICAL ADVICE (OUTPATIENT)
Dept: FAMILY MEDICINE | Facility: CLINIC | Age: 60
End: 2025-01-16

## 2025-01-16 ENCOUNTER — OFFICE VISIT (OUTPATIENT)
Dept: DERMATOLOGY | Facility: CLINIC | Age: 60
End: 2025-01-16
Payer: COMMERCIAL

## 2025-01-16 DIAGNOSIS — L81.4 LENTIGO: ICD-10-CM

## 2025-01-16 DIAGNOSIS — D22.9 MULTIPLE NEVI: ICD-10-CM

## 2025-01-16 DIAGNOSIS — Z85.828 HISTORY OF NONMELANOMA SKIN CANCER: ICD-10-CM

## 2025-01-16 DIAGNOSIS — Z12.83 SKIN CANCER SCREENING: Primary | ICD-10-CM

## 2025-01-16 DIAGNOSIS — L82.1 SEBORRHEIC KERATOSIS: ICD-10-CM

## 2025-01-16 DIAGNOSIS — D18.01 CHERRY ANGIOMA: ICD-10-CM

## 2025-01-16 DIAGNOSIS — L98.9 LESION OF SUBCUTANEOUS TISSUE: ICD-10-CM

## 2025-01-16 NOTE — PROGRESS NOTES
[facility] Dermatology Note  Encounter Date: Jan 16, 2025  Office Visit     Reviewed patients past medical history and pertinent chart review prior to patients visit today.     Dermatology Problem List:  Last skin check: 1/16/2025  # History of BCC, diagnosed and treated at an outside facility on the bilateral forearms    Family Hx: Negative for family history of skin cancer.    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  _________________________________________    Assessment & Plan:     # Personal history of BCC  - No signs of recurrence. Continued observation recommended.   - Advised to monitor for changing, non-healing, bleeding, painful, changing, or otherwise symptomatic lesions  - Sun protection: Counseled SPF 30+ sunscreen, UPF clothing, sun avoidance, tanning bed avoidance.    # Subcutaneous papule, left posterior proximal forearm, suspected lipoma vs other  - Benign, no further treatment needed. Discussed options of continued observation vs ultrasound for further characterization. Patient elects to continue with observation. He will follow up should he notice any changes.    # Multiple nevi, trunk and extremities  # Solar lentigines  - Continued observation recommended.   - Nevi demonstrate no concerning features on dermoscopy. We discussed the importance of self exams at home.   - ABCDEs: Counseled ABCDEs of melanoma: Asymmetry, Border (irregularity), Color (not uniform, changes in color), Diameter (greater than 6 mm which is about the size of a pencil eraser), and Evolving (any changes in preexisting moles).  - Sun protection: Counseled SPF 30+ sunscreen, UPF clothing, sun avoidance, tanning bed avoidance.    # Cherry angiomas  # Seborrheic keratoses  - We discussed the benign nature of the skin lesions. No treatment required. Continued observation recommended. Follow up with any concerns.      Follow-up: 1 year(s) for follow up full body skin exam, prn  for new or changing lesions or new concerns    All risks, benefits and alternatives were discussed with patient.  Patient is in agreement and understands the assessment and plan.  All questions were answered.  Conchita Chun PA-C  [facility] Dermatology  ____________________________________________    CC: Skin cancer screening exam    HPI:  Mr. Willard Manzano is a(n) 59 year old male who presents today as a return patient for a full body skin cancer screening. Patient was recently seen in dermatology on 1/8/2025 for a spot check at which time a subcutaneous papule suspected to be a lipoma vs other was noted at the left forearm. He would like this re-evaluated today. This has been present for a few months. It is sometimes tender when he presses on it.    Patient is diligent with photoprotection. Patient is otherwise feeling well, without additional skin concerns.     Physical Exam:  Vitals: There were no vitals taken for this visit.  SKIN: Total skin excluding the genitalia areas was performed. The exam included the head/face, neck, both arms, chest, back, abdomen, both legs, digits, mons pubis, buttock and nails.   -Morris I  -left posterior proximal forearm, brown macule with reassuring features and adjacent roughly 4 mm soft, mobile, subcutaneous papule  -multiple tan/brown flat round macules and raised papules scattered throughout trunk, extremities, and face. No worrisome features for malignancy noted on examination.  -scattered tan, homogenous macules scattered on sun exposed areas of trunk, extremities, and face  -scattered waxy, stuck on tan/brown papules and patches on the trunk and extremities  -several 1-2mm red dome shaped symmetric papules scattered on the trunk and extremities    - No other lesions of concern on areas examined.     Medications:  Current Outpatient Medications   Medication Sig Dispense Refill    aspirin 81 MG EC tablet Take 81 mg by mouth daily OTC      fenofibrate (TRICOR) 145 MG  tablet Take 1 tablet (145 mg) by mouth daily. 90 tablet 3    loratadine (CLARITIN) 10 MG tablet Take 10 mg by mouth daily      metFORMIN (GLUCOPHAGE XR) 500 MG 24 hr tablet Take 2 tablets (1,000 mg) by mouth daily (with dinner). 180 tablet 1    Omega-3 Fatty Acids (FISH OIL PO) Take 300 mg by mouth daily      omeprazole (PRILOSEC) 40 MG DR capsule TAKE 1 CAPSULE(40 MG) BY MOUTH DAILY 30 TO 60 MINUTES BEFORE A MEAL 90 capsule 3    rosuvastatin (CRESTOR) 20 MG tablet Take 1 tablet (20 mg) by mouth daily. 90 tablet 3     No current facility-administered medications for this visit.      Past Medical History:   Patient Active Problem List   Diagnosis    Chiari malformation type I (H)    Hyperlipidemia LDL goal <70    Family history of colon cancer    Elevated fasting glucose    Family history of coronary artery disease    Esophageal reflux    Benign prostatic hyperplasia with urinary hesitancy    Personal history of DVT (deep vein thrombosis)    Prediabetes    Pedal edema    Varicose veins of both lower extremities    Pain in both knees, unspecified chronicity     Past Medical History:   Diagnosis Date    Arthritis 01/2011    Basal cell carcinoma     Blood clotting disorder     Cancer (H)     Skin    Chiari malformation type I (H)     Elevated fasting glucose 01/19/2012    Headaches, migraine     Headaches, migraine     Other and unspecified hyperlipidemia     Pedal edema 06/10/2021    Rotator cuff tear 01/22/2012    Rotator cuff tear 01/22/2012    Rotator cuff tendinitis 01/2011    Inj. Dr. Cheney.     Spinal headache 2007    spontaneous spinal fluid leak - 2 blood patches    Urethral polyp 06/24/2010    Dr. Card    Urethral polyp 06/24/2010    Dr. Card     Varicose veins of both lower extremities 06/10/2021       CC Referred Self, MD  No address on file on close of this encounter.

## 2025-01-16 NOTE — LETTER
1/16/2025      Willard Manzano  [id number] [address]      Dear Colleague,    Thank you for referring your patient, Willard Manzano, to the [facility]. Please see a copy of my visit note below.    [facility] Dermatology Note  Encounter Date: Jan 16, 2025  Office Visit     Reviewed patients past medical history and pertinent chart review prior to patients visit today.     Dermatology Problem List:  Last skin check: 1/16/2025  # History of BCC, diagnosed and treated at an outside facility on the bilateral forearms    Family Hx: Negative for family history of skin cancer.    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  _________________________________________    Assessment & Plan:     # Personal history of BCC  - No signs of recurrence. Continued observation recommended.   - Advised to monitor for changing, non-healing, bleeding, painful, changing, or otherwise symptomatic lesions  - Sun protection: Counseled SPF 30+ sunscreen, UPF clothing, sun avoidance, tanning bed avoidance.    # Subcutaneous papule, left posterior proximal forearm, suspected lipoma vs other  - Benign, no further treatment needed. Discussed options of continued observation vs ultrasound for further characterization. Patient elects to continue with observation. He will follow up should he notice any changes.    # Multiple nevi, trunk and extremities  # Solar lentigines  - Continued observation recommended.   - Nevi demonstrate no concerning features on dermoscopy. We discussed the importance of self exams at home.   - ABCDEs: Counseled ABCDEs of melanoma: Asymmetry, Border (irregularity), Color (not uniform, changes in color), Diameter (greater than 6 mm which is about the size of a pencil eraser), and Evolving (any changes in preexisting moles).  - Sun protection: Counseled SPF 30+ sunscreen, UPF clothing, sun avoidance, tanning bed avoidance.    # Cherry  angiomas  # Seborrheic keratoses  - We discussed the benign nature of the skin lesions. No treatment required. Continued observation recommended. Follow up with any concerns.      Follow-up: 1 year(s) for follow up full body skin exam, prn for new or changing lesions or new concerns    All risks, benefits and alternatives were discussed with patient.  Patient is in agreement and understands the assessment and plan.  All questions were answered.  Conchita Chun PA-C  [facility] Dermatology  ____________________________________________    CC: Skin cancer screening exam    HPI:  Mr. Willard Manzano is a(n) 59 year old male who presents today as a return patient for a full body skin cancer screening. Patient was recently seen in dermatology on 1/8/2025 for a spot check at which time a subcutaneous papule suspected to be a lipoma vs other was noted at the left forearm. He would like this re-evaluated today. This has been present for a few months. It is sometimes tender when he presses on it.    Patient is diligent with photoprotection. Patient is otherwise feeling well, without additional skin concerns.     Physical Exam:  Vitals: There were no vitals taken for this visit.  SKIN: Total skin excluding the genitalia areas was performed. The exam included the head/face, neck, both arms, chest, back, abdomen, both legs, digits, mons pubis, buttock and nails.   -Morris I  -left posterior proximal forearm, brown macule with reassuring features and adjacent roughly 4 mm soft, mobile, subcutaneous papule  -multiple tan/brown flat round macules and raised papules scattered throughout trunk, extremities, and face. No worrisome features for malignancy noted on examination.  -scattered tan, homogenous macules scattered on sun exposed areas of trunk, extremities, and face  -scattered waxy, stuck on tan/brown papules and patches on the trunk and extremities  -several 1-2mm red dome shaped symmetric papules scattered on the trunk  and extremities    - No other lesions of concern on areas examined.     Medications:  Current Outpatient Medications   Medication Sig Dispense Refill     aspirin 81 MG EC tablet Take 81 mg by mouth daily OTC       fenofibrate (TRICOR) 145 MG tablet Take 1 tablet (145 mg) by mouth daily. 90 tablet 3     loratadine (CLARITIN) 10 MG tablet Take 10 mg by mouth daily       metFORMIN (GLUCOPHAGE XR) 500 MG 24 hr tablet Take 2 tablets (1,000 mg) by mouth daily (with dinner). 180 tablet 1     Omega-3 Fatty Acids (FISH OIL PO) Take 300 mg by mouth daily       omeprazole (PRILOSEC) 40 MG DR capsule TAKE 1 CAPSULE(40 MG) BY MOUTH DAILY 30 TO 60 MINUTES BEFORE A MEAL 90 capsule 3     rosuvastatin (CRESTOR) 20 MG tablet Take 1 tablet (20 mg) by mouth daily. 90 tablet 3     No current facility-administered medications for this visit.      Past Medical History:   Patient Active Problem List   Diagnosis     Chiari malformation type I (H)     Hyperlipidemia LDL goal <70     Family history of colon cancer     Elevated fasting glucose     Family history of coronary artery disease     Esophageal reflux     Benign prostatic hyperplasia with urinary hesitancy     Personal history of DVT (deep vein thrombosis)     Prediabetes     Pedal edema     Varicose veins of both lower extremities     Pain in both knees, unspecified chronicity     Past Medical History:   Diagnosis Date     Arthritis 01/2011     Basal cell carcinoma      Blood clotting disorder      Cancer (H)     Skin     Chiari malformation type I (H)      Elevated fasting glucose 01/19/2012     Headaches, migraine      Headaches, migraine      Other and unspecified hyperlipidemia      Pedal edema 06/10/2021     Rotator cuff tear 01/22/2012     Rotator cuff tear 01/22/2012     Rotator cuff tendinitis 01/2011    Inj. Dr. Cheney.      Spinal headache 2007    spontaneous spinal fluid leak - 2 blood patches     Urethral polyp 06/24/2010    Dr. Card     Urethral polyp 06/24/2010      Stephie      Varicose veins of both lower extremities 06/10/2021       CC Referred Self, MD  No address on file on close of this encounter.      Again, thank you for allowing me to participate in the care of your patient.        Sincerely,        Katja Chun PA-C    Electronically signed

## 2025-01-29 ASSESSMENT — SLEEP AND FATIGUE QUESTIONNAIRES
HOW LIKELY ARE YOU TO NOD OFF OR FALL ASLEEP WHILE SITTING QUIETLY AFTER LUNCH WITHOUT ALCOHOL: SLIGHT CHANCE OF DOZING
HOW LIKELY ARE YOU TO NOD OFF OR FALL ASLEEP IN A CAR, WHILE STOPPED FOR A FEW MINUTES IN TRAFFIC: WOULD NEVER DOZE
HOW LIKELY ARE YOU TO NOD OFF OR FALL ASLEEP WHILE SITTING AND READING: SLIGHT CHANCE OF DOZING
HOW LIKELY ARE YOU TO NOD OFF OR FALL ASLEEP WHILE WATCHING TV: SLIGHT CHANCE OF DOZING
HOW LIKELY ARE YOU TO NOD OFF OR FALL ASLEEP WHILE LYING DOWN TO REST IN THE AFTERNOON WHEN CIRCUMSTANCES PERMIT: MODERATE CHANCE OF DOZING
HOW LIKELY ARE YOU TO NOD OFF OR FALL ASLEEP WHEN YOU ARE A PASSENGER IN A CAR FOR AN HOUR WITHOUT A BREAK: WOULD NEVER DOZE
HOW LIKELY ARE YOU TO NOD OFF OR FALL ASLEEP WHILE SITTING INACTIVE IN A PUBLIC PLACE: WOULD NEVER DOZE
HOW LIKELY ARE YOU TO NOD OFF OR FALL ASLEEP WHILE SITTING AND TALKING TO SOMEONE: WOULD NEVER DOZE

## 2025-02-03 ENCOUNTER — OFFICE VISIT (OUTPATIENT)
Dept: SLEEP MEDICINE | Facility: CLINIC | Age: 60
End: 2025-02-03
Attending: PHYSICIAN ASSISTANT
Payer: COMMERCIAL

## 2025-02-03 VITALS
OXYGEN SATURATION: 93 % | SYSTOLIC BLOOD PRESSURE: 130 MMHG | HEART RATE: 76 BPM | BODY MASS INDEX: 29.95 KG/M2 | WEIGHT: 208.7 LBS | DIASTOLIC BLOOD PRESSURE: 82 MMHG

## 2025-02-03 DIAGNOSIS — F51.04 CHRONIC INSOMNIA: ICD-10-CM

## 2025-02-03 DIAGNOSIS — G47.8 UNREFRESHED BY SLEEP: ICD-10-CM

## 2025-02-03 DIAGNOSIS — E11.9 TYPE 2 DIABETES MELLITUS WITHOUT COMPLICATION, WITHOUT LONG-TERM CURRENT USE OF INSULIN (H): ICD-10-CM

## 2025-02-03 DIAGNOSIS — R29.818 SUSPECTED SLEEP APNEA: Primary | ICD-10-CM

## 2025-02-03 DIAGNOSIS — R40.0 DAYTIME SLEEPINESS: ICD-10-CM

## 2025-02-03 DIAGNOSIS — R06.83 SNORING: ICD-10-CM

## 2025-02-03 DIAGNOSIS — Z82.49 FAMILY HISTORY OF CORONARY ARTERY DISEASE: Primary | ICD-10-CM

## 2025-02-03 DIAGNOSIS — R53.83 FATIGUE, UNSPECIFIED TYPE: ICD-10-CM

## 2025-02-03 PROCEDURE — 99204 OFFICE O/P NEW MOD 45 MIN: CPT | Performed by: INTERNAL MEDICINE

## 2025-02-03 NOTE — NURSING NOTE
"Chief Complaint   Patient presents with    Sleep Problem     Pt states not sleeping soundly at night. Pt states waking up often in the night, half awake-half dreaming. 1 year duration. Pt states he does not need to get up to urinate.       Initial /82   Pulse 76   Wt 94.7 kg (208 lb 11.2 oz)   SpO2 93%   BMI 29.95 kg/m   Estimated body mass index is 29.95 kg/m  as calculated from the following:    Height as of 12/31/24: 1.778 m (5' 10\").    Weight as of this encounter: 94.7 kg (208 lb 11.2 oz).    Medication Reconciliation: complete    Neck circumference: 17 inches / 43 centimeters.    ESS 5    VINCENT 11    Troy Carney MA   "

## 2025-02-03 NOTE — PROGRESS NOTES
Outpatient Sleep Medicine Consultation:      Name: Willard Manzano MRN# [id number]   Age: 60 year old YOB: 1965     Date of Consultation: February 3, 2025  Consultation is requested by: Cassandra Villafana PA-C  [address] [facility] [address]  [place] [address] Cassandra Villafana  Primary care provider: Alissa Wolf       Reason for Sleep Consult:     Willard Manzano is sent by Cassandra Villafana for a sleep consultation regarding obtaining evaluation for possible sleep apnea.    Patient s Reason for visit  Willard Manzano main reason for visit: (Patient-Rptd) To figure out why I don't stay a sleep and feel rested it the morning.  Patient states problem(s) started: (Patient-Rptd) For a while, about a year  Willard Manzano's goals for this visit:             Assessment and Plan:     Summary Sleep Diagnoses and Summary Recommendations:  Snoring, nonrestorative sleep, fatigue and excessive daytime sleepiness(though it does not correlate with the [place] sleepiness score) . STOP BANG 5/8  Possible obstructive sleep apnea.  2.  Chronic insomnia (maintenance)-likely due to untreated sleep apnea, psychophysiological    Home sleep apnea testing and polysomnography reviewed.  Recommended obtaining home sleep study to evaluate for possible sleep disordered breathing. If the home sleep study does not show evidence of ELIZABETH, we will consider obtaining in-lab sleep study and the patient was agreeable with the plan.  Discussed pathophysiology and risks of untreated ELIZABETH.  Information provided regarding treatment options for ELIZABETH.  If the sleep study shows evidence of moderate to severe obstructive sleep apnea and hypoxemia, we discussed CPAP would be safe and effective treatment option.  If the sleep study shows mild ELIZABETH ,we discussed the option of treatment using mandibular advancement device through referral to sleep dentistry.    Patient was amenable to treatment using CPAP device if indicated.  Patient will follow up 3 months after the  "sleep study. We will review results and initiate treatment (if indicated) over [facility] prior to the follow up.   Chronic insomnia(middle insomnia) the patient reports is likely could be due to possible untreated sleep disordered breathing.  Patient was instructed to leave the bedroom if it takes more than 20 minutes to fall back asleep after the awakening during the middle of the night and to go to a different room, keep the lights dim and engage in relaxing activity like reading a book or listening to music and to return back to bed when he is ready to sleep.  We discussed optimizing sleep hygiene measures including avoiding napping during the day and also to avoid caffeine within 6 hours before bed.  We discussed weight management with diet and exercise.  Patient was strongly advised to avoid driving, operating any heavy machinery or other hazardous situations while drowsy or sleepy.  Patient was counseled on the importance of driving while alert, to pull over if drowsy, or nap before getting into the vehicle if sleepy.       Comorbid Diagnoses:  Type 2 diabetes mellitus without complication, without long-term current use of insulin (newly diagnosed)  Gastroesophageal reflux disease with esophagitis without hemorrhage   Varicose veins both lower extremities  Hyperlipidemia   Chiari malformation type I  Personal history of DVT        Orders Placed This Encounter   Procedures    HST-Home Sleep Apnea Test - Noxturnal Returnable         Summary Counseling:    Sleep Testing Reviewed  Obstructive Sleep Apnea Reviewed  Complications of Untreated Sleep Apnea Reviewed       Medical Decision-making:   Educational materials provided in instructions      CC: Cassandra Villafana PA-C      The above note was dictated using voice recognition software. Although reviewed after completion, some word and grammatical error may remain . Please contact the author for any clarifications.     \" Total time spent was 52 minutes for this " "appointment on this date of service which include time spent before, during and after the visit for chart review, patient care, counseling and coordination of care including documentation.\"      Lynnette Georges MD  [facility]  [address] , [place] [address]             History of Present Illness:     Past Sleep Evaluations: None    SLEEP-WAKE SCHEDULE:     Work/School Days: Patient goes to school/work: (Patient-Rptd) Yes (Works from 6AM-230PM Mon-Fri))   Usually gets into bed at (Patient-Rptd) about 9pm  Takes patient about (Patient-Rptd) 5-10 minutes to fall asleep  Has trouble falling asleep (Patient-Rptd) none   Wakes up in the middle of the night (Patient-Rptd) 1-2 times   Wakes up due to (Patient-Rptd) Uncertain  He has trouble falling back asleep (Patient-Rptd) 4 times a week.   It usually takes variable time to get back to sleep. He eventually falls back asleep, but feels he does not get enough deep sleep  Patient is usually up at (Patient-Rptd) 4:45-5:00  Uses alarm: (Patient-Rptd) Yes    Weekends/Non-work Days/All Other Days:  Usually gets into bed at (Patient-Rptd) about 9pm   Takes patient about (Patient-Rptd) 5-10 minutes to fall asleep  Patient is usually up at 6AM on Sat with an alarm (breakfast with friends)  On Sundays, he wakes up between 730-8-30AM  spontaneously    He reports non-restorative sleep, fatigue and EDS.    Sleep Need  Patient gets  (Patient-Rptd) 5-6 hours sleep on average   Patient thinks he needs about (Patient-Rptd) 6-7 hrs sleep    Willard Manzano prefers to sleep in this position(s): (Patient-Rptd) Side   Patient states they do the following activities in bed:      Naps  Patient takes a purposeful nap (Patient-Rptd) 1-2 times a week times a week and naps are usually (Patient-Rptd) 1-2 hours in duration  He feels better after a nap: (Patient-Rptd) No  He dozes off unintentionally (Patient-Rptd) 1 days per week  Patient has had a " driving accident or near-miss due to sleepiness/drowsiness: (Patient-Rptd) No      SLEEP DISRUPTIONS:    Breathing/Snoring  Patient snores:(Patient-Rptd) Yes  Other people complain about his snoring: (Patient-Rptd) No  Patient has been told he stops breathing in his sleep:(Patient-Rptd) No  He has issues with the following: (Patient-Rptd) Morning mouth dryness;Stuffy nose when you wake up    Movement:  Patient gets pain, discomfort, with an urge to move:  (Patient-Rptd) No  It happens when he is resting:  (Patient-Rptd) No  It happens more at night:  (Patient-Rptd) No  Patient has been told he kicks his legs at night:  (Patient-Rptd) No     Behaviors in Sleep:  There are no reports of sleepwalking, sleep eating, sleep talking or dream enactment behavior.  He has experienced sudden muscle weakness during the day: (Patient-Rptd) No      Is there anything else you would like your sleep provider to know:          CAFFEINE AND OTHER SUBSTANCES:    Patient consumes caffeinated beverages per day:  (Patient-Rptd) 1-2  Last caffeine use is usually: (Patient-Rptd) 4pm  List of any prescribed or over the counter stimulants that patient takes:    List of any prescribed or over the counter sleep medication patient takes:    List of previous sleep medications that patient has tried:    Patient drinks alcohol to help them sleep: (Patient-Rptd) No  Patient drinks alcohol near bedtime: (Patient-Rptd) No    Family History:  Patient has a family member been diagnosed with a sleep disorder: (Patient-Rptd) No            SCALES:    EPWORTH SLEEPINESS SCALE         1/29/2025     4:39 PM    [place] Sleepiness Scale ( GLORIA Cerna  [id number]<br>ESS - [place]/English - Final version - 21 Nov 07 - [facility][1] Research [place].)   Sitting and reading Slight chance of dozing   Watching TV Slight chance of dozing   Sitting, inactive in a public place (e.g. a theatre or a meeting) Would never doze   As a passenger in a car for an hour without a break Would  never doze   Lying down to rest in the afternoon when circumstances permit Moderate chance of dozing   Sitting and talking to someone Would never doze   Sitting quietly after a lunch without alcohol Slight chance of dozing   In a car, while stopped for a few minutes in traffic Would never doze   Carey Score (MC) 5   Carey Score (Sleep) 5        Patient-reported         INSOMNIA SEVERITY INDEX (VINCENT)          1/29/2025     4:20 PM   Insomnia Severity Index (VINCENT)   Difficulty falling asleep 1   Difficulty staying asleep 2   Problems waking up too early 1   How SATISFIED/DISSATISFIED are you with your CURRENT sleep pattern? 3   How NOTICEABLE to others do you think your sleep problem is in terms of impairing the quality of your life? 1   How WORRIED/DISTRESSED are you about your current sleep problem? 2   To what extent do you consider your sleep problem to INTERFERE with your daily functioning (e.g. daytime fatigue, mood, ability to function at work/daily chores, concentration, memory, mood, etc.) CURRENTLY? 1   VINCENT Total Score 11        Patient-reported       Guidelines for Scoring/Interpretation:  Total score categories:  0-7 = No clinically significant insomnia   8-14 = Subthreshold insomnia   15-21 = Clinical insomnia (moderate severity)  22-28 = Clinical insomnia (severe)  Used via courtesy of www.[employer].va.gov with permission from Rojelio Argueta PhD., [facility]      STOP BANG 5/8        2/3/2025     2:21 PM   STOP BANG Questionnaire (  2008, the American Society of Anesthesiologists, Inc. [employer]   Neck Cir (cm) Clinic: 43 cm   B/P Clinic: 130/82         GAD7        11/16/2020     2:25 PM   REZA-7    1. Feeling nervous, anxious, or on edge 0    2. Not being able to stop or control worrying 0    3. Worrying too much about different things 0    4. Trouble relaxing 0    5. Being so restless that it is hard to sit still 0    6. Becoming easily annoyed or irritable 0    7.  "Feeling afraid, as if something awful might happen 0    REZA-7 Total Score 0       Proxy-reported         CAGE-AID         No data to display                CAGE-AID reprinted with permission from the [facility] Journal, RUBY Cerna. and MANUELITO Ward, \"Conjoint screening questionnaires for alcohol and drug abuse\" [place] Medical Journal 94: 135-140, 1995.      PATIENT HEALTH QUESTIONNAIRE-9 (PHQ - 9)        11/16/2020     2:25 PM   PHQ-9 ([employer])   1.  Little interest or pleasure in doing things 0    2.  Feeling down, depressed, or hopeless 0    3.  Trouble falling or staying asleep, or sleeping too much 0    4.  Feeling tired or having little energy 0    5.  Poor appetite or overeating 0    6.  Feeling bad about yourself - or that you are a failure or have let yourself or your family down 0    7.  Trouble concentrating on things, such as reading the newspaper or watching television 0    8.  Moving or speaking so slowly that other people could have noticed. Or the opposite - being so fidgety or restless that you have been moving around a lot more than usual 0    9.  Thoughts that you would be better off dead, or of hurting yourself in some way 0    PHQ-9 Total Score 0   6.  Feeling bad about yourself 0    7.  Trouble concentrating 0    8.  Moving slowly or restless 0    9.  Suicidal or self-harm thoughts 0    1.  Little interest or pleasure in doing things Not at all   2.  Feeling down, depressed, or hopeless Not at all   3.  Trouble falling or staying asleep, or sleeping too much Not at all   4.  Feeling tired or having little energy Not at all   5.  Poor appetite or overeating Not at all   6.  Feeling bad about yourself Not at all   7.  Trouble concentrating Not at all   8.  Moving slowly or restless Not at all   9.  Suicidal or self-harm thoughts Not at all   PHQ-9 via MyChart TOTAL SCORE-----> 0   Difficulty at work, home, or with people Not difficult at all       Proxy-reported       Developed by Becky" Wilner Soto, Flor Albright, Brando Ratliff and colleagues, with an educational catrachito from [employer]. No permission required to reproduce, translate, display or distribute.        Allergies:    Allergies   Allergen Reactions    Mold Shortness Of Breath    Cats      Congestion and drainage    Grass Cough    No Known Allergies        Medications:    Current Outpatient Medications   Medication Sig Dispense Refill    aspirin 81 MG EC tablet Take 81 mg by mouth daily OTC      fenofibrate (TRICOR) 145 MG tablet Take 1 tablet (145 mg) by mouth daily. 90 tablet 3    loratadine (CLARITIN) 10 MG tablet Take 10 mg by mouth daily      metFORMIN (GLUCOPHAGE XR) 500 MG 24 hr tablet Take 2 tablets (1,000 mg) by mouth daily (with dinner). 180 tablet 1    Omega-3 Fatty Acids (FISH OIL PO) Take 300 mg by mouth daily      omeprazole (PRILOSEC) 40 MG DR capsule TAKE 1 CAPSULE(40 MG) BY MOUTH DAILY 30 TO 60 MINUTES BEFORE A MEAL 90 capsule 3    rosuvastatin (CRESTOR) 20 MG tablet Take 1 tablet (20 mg) by mouth daily. 90 tablet 3       Problem List:  Patient Active Problem List    Diagnosis Date Noted    Pedal edema 06/10/2021     Priority: Medium    Varicose veins of both lower extremities 06/10/2021     Priority: Medium    Pain in both knees, unspecified chronicity 06/10/2021     Priority: Medium    Prediabetes 11/16/2020     Priority: Medium    Personal history of DVT (deep vein thrombosis) 12/26/2018     Priority: Medium    Benign prostatic hyperplasia with urinary hesitancy 12/30/2015     Priority: Medium    Esophageal reflux 08/20/2014     Priority: Medium    Family history of coronary artery disease 08/15/2013     Priority: Medium    Elevated fasting glucose 01/19/2012     Priority: Medium    Family history of colon cancer 11/23/2010     Priority: Medium    Hyperlipidemia LDL goal <70 10/31/2010     Priority: Medium    Chiari malformation type I (H)      Priority: Medium     Diagnosed in 2007          Past  Medical/Surgical History:  Past Medical History:   Diagnosis Date    Arthritis 01/2011    Basal cell carcinoma     Blood clotting disorder     Cancer (H)     Skin    Chiari malformation type I (H)     Elevated fasting glucose 01/19/2012    Headaches, migraine     Headaches, migraine     Other and unspecified hyperlipidemia     Pedal edema 06/10/2021    Rotator cuff tear 01/22/2012    Rotator cuff tear 01/22/2012    Rotator cuff tendinitis 01/2011    Inj. Dr. Cheney.     Spinal headache 2007    spontaneous spinal fluid leak - 2 blood patches    Urethral polyp 06/24/2010    Dr. Card    Urethral polyp 06/24/2010    Dr. Card     Varicose veins of both lower extremities 06/10/2021     Past Surgical History:   Procedure Laterality Date    ABDOMEN SURGERY  2006    Umbilical Hernia repair    BIOPSY      arms cancer, 2 spots    COLONOSCOPY  12/29/2010    non-adenomatous polyp. Advised 5 yr f/u.     COLONOSCOPY N/A 02/19/2021    Procedure: COLONOSCOPY;  Surgeon: Broderick Rivera MD;  Location: [facility]    HC REMOVAL OF TONSILS,<11 Y/O      HERNIA REPAIR  2006    ORTHOPEDIC SURGERY  2018    Rotator Cuff Repair    SOFT TISSUE SURGERY  2023    Left thumb    SURGICAL HISTORY OF -   02/2006    umbilical hernia repair with mesh    ZZHC COLONOSCOPY THRU STOMA, DIAGNOSTIC  06/26/2008    normal. 3 yr f/u due to brother CA age 43.    ZZHC VASECTOMY UNILAT/BILAT W POSTOP SEMEN         Social History:  Social History     Socioeconomic History    Marital status:      Spouse name: Not on file    Number of children: Not on file    Years of education: Not on file    Highest education level: Not on file   Occupational History    Not on file   Tobacco Use    Smoking status: Never    Smokeless tobacco: Never   Vaping Use    Vaping status: Never Used   Substance and Sexual Activity    Alcohol use: Yes     Comment: 1-2 drinks a week    Drug use: No    Sexual activity: Yes     Partners: Female     Birth control/protection: Male  Surgical     Comment: I'm fixed   Other Topics Concern    Parent/sibling w/ CABG, MI or angioplasty before 65F 55M? Yes     Comment: Brother   Social History Narrative    Not on file     Social Drivers of Health     Financial Resource Strain: Low Risk  (12/26/2024)    Financial Resource Strain     Within the past 12 months, have you or your family members you live with been unable to get utilities (heat, electricity) when it was really needed?: No   Food Insecurity: Low Risk  (12/26/2024)    Food Insecurity     Within the past 12 months, did you worry that your food would run out before you got money to buy more?: No     Within the past 12 months, did the food you bought just not last and you didn t have money to get more?: No   Transportation Needs: Low Risk  (12/26/2024)    Transportation Needs     Within the past 12 months, has lack of transportation kept you from medical appointments, getting your medicines, non-medical meetings or appointments, work, or from getting things that you need?: No   Physical Activity: Insufficiently Active (12/26/2024)    Exercise Vital Sign     Days of Exercise per Week: 1 day     Minutes of Exercise per Session: 30 min   Stress: No Stress Concern Present (12/26/2024)    [employer] [employer] - Occupational Stress Questionnaire     Feeling of Stress : Only a little   Social Connections: Unknown (12/26/2024)    Social Connection and Isolation Panel [NHANES]     Frequency of Communication with Friends and Family: Not on file     Frequency of Social Gatherings with Friends and Family: Once a week     Attends Uats[employer] Services: Not on file     Active Member of Clubs or Organizations: Not on file     Attends Club or Organization Meetings: Not on file     Marital Status: Not on file   Interpersonal Safety: Low Risk  (12/31/2024)    Interpersonal Safety     Do you feel physically and emotionally safe where you currently live?: Yes     Within the past 12 months, have you  been hit, slapped, kicked or otherwise physically hurt by someone?: No     Within the past 12 months, have you been humiliated or emotionally abused in other ways by your partner or ex-partner?: No   Housing Stability: Low Risk  (2024)    Housing Stability     Do you have housing? : Yes     Are you worried about losing your housing?: No       Family History:  Family History   Problem Relation Age of Onset    Diabetes Mother         dx in her 40's. overwt. Now IDDM.    Hypertension Mother     Lipids Mother     Gallbladder Disease Mother     Cancer Father          lung CA with brain mets age 65    Other Cancer Father         Lung    Substance Abuse Father         Alcoholism    Other Cancer Brother         Lung    Cardiovascular Brother         Loi. Brother MI at age 49. Had another stent at age 52, Bypass .    Colon Cancer Brother         Diagnosed at age 40s    Other Cancer Brother         Lung    Diabetes Sister     Family History Negative Sister     Breast Cancer Sister         Dx bilateral breast CA at age 56.     Other Cancer Sister         Lung    Breast Cancer Sister         (pronounced Muh-jay)    Allergies Son     Substance Abuse Son     Family History Negative Son     Substance Abuse Son         Alcoholism    Family History Negative Daughter        Review of Systems:  A complete review of systems reviewed by me is negative with the exeption of what has been mentioned in the history of present illness.  In the last TWO WEEKS have you experienced any of the following symptoms?  Fevers: (Patient-Rptd) No  Night Sweats: (Patient-Rptd) No  Weight Gain: (Patient-Rptd) No  Pain at Night: (Patient-Rptd) No  Double Vision: (Patient-Rptd) No  Changes in Vision: (Patient-Rptd) No  Difficulty Breathing through Nose: (Patient-Rptd) No  Sore Throat in Morning: (Patient-Rptd) No  Dry Mouth in the Morning: (Patient-Rptd) Yes  Shortness of Breath Lying Flat: (Patient-Rptd) No  Shortness of Breath With  "Activity: (Patient-Rptd) No  Awakening with Shortness of Breath: (Patient-Rptd) No  Increased Cough: (Patient-Rptd) No  Heart Racing at Night: (Patient-Rptd) No  Swelling in Feet or Legs: (Patient-Rptd) No  Diarrhea at Night: (Patient-Rptd) No  Heartburn at Night: (Patient-Rptd) No  Urinating More than Once at Night: (Patient-Rptd) No  Losing Control of Urine at Night: (Patient-Rptd) No  Joint Pains at Night: (Patient-Rptd) No  Headaches in Morning: (Patient-Rptd) No  Weakness in Arms or Legs: (Patient-Rptd) No  Depressed Mood: (Patient-Rptd) No  Anxiety: (Patient-Rptd) No      Physical Examination:  Vitals: /82   Pulse 76   Wt 94.7 kg (208 lb 11.2 oz)   SpO2 93%   BMI 29.95 kg/m    BMI= Body mass index is 29.95 kg/m .    Neck Cir (cm): 43 cm  General: No apparent distress, appropriately groomed  Head: Normocephalic, atraumatic  Nose, mouth and throat: Mildly deviated nasal septum but patent airflow through both the nares  Oropharynx: Mallampati Class: III.Tonsillar Stage: 0 surgically removed.  Neck:Circumference: 17 inches  Chest: clear to auscultation - no rales, rhonchi or wheezes  CV: regular rates and rhythm, normal S1 S2, no S3 or S4, and no murmurs, click or rub  Psych: Mood and affect normal   Neuro:  Mental status: Alert and  Oriented X 3  Speech: normal          Data: All pertinent previous laboratory data reviewed     Recent Labs   Lab Test 12/31/24  1310 12/13/23  1634    140   POTASSIUM 4.9 4.2   CHLORIDE 104 101   CO2 27 28   ANIONGAP 11 11   * 92   BUN 16.5 22.3   CR 1.29* 1.12   MAR 9.7 9.8       Recent Labs   Lab Test 12/31/24  1310   WBC 4.2   RBC 5.49   HGB 16.3   HCT 47.9   MCV 87   MCH 29.7   MCHC 34.0   RDW 13.1          Recent Labs   Lab Test 12/31/24  1310   PROTTOTAL 7.3   ALBUMIN 4.5   BILITOTAL 0.6   ALKPHOS 36*   AST 25   ALT 34       TSH (mU/L)   Date Value   02/18/2022 1.82   12/07/2020 1.45   08/09/2013 1.51       No results found for: \"UAMP\", \"UBARB\", " "\"BENZODIAZEUR\", \"UCANN\", \"UCOC\", \"OPIT\", \"UPCP\"    No results found for: \"IRONSAT\", \"VR39543\", \"RAJ\"    No results found for: \"PH\", \"PHARTERIAL\", \"PO2\", \"NH8HOQSZYPD\", \"SAT\", \"PCO2\", \"HCO3\", \"BASEEXCESS\", \"JOHANA\", \"BEB\"    Echocardiography:   Study Date: 07/27/2022   Interpretation Summary:  The visual ejection fraction is 55-60%.  Left ventricular systolic function is normal.  The study was technically difficult.     Chest x-ray: No results found for this or any previous visit from the past 365 days.      Chest CT: No results found for this or any previous visit from the past 365 days.      PFT: Most Recent [place]ze Pulmonary Function Testing: No results found        Lynnette Georges MD 2/3/2025           "

## 2025-02-25 ENCOUNTER — E-VISIT (OUTPATIENT)
Dept: FAMILY MEDICINE | Facility: CLINIC | Age: 60
End: 2025-02-25
Payer: COMMERCIAL

## 2025-02-25 DIAGNOSIS — L30.9 DERMATITIS: ICD-10-CM

## 2025-02-25 DIAGNOSIS — H93.92 LESION OF LEFT EAR: Primary | ICD-10-CM

## 2025-02-26 RX ORDER — TRIAMCINOLONE ACETONIDE 1 MG/G
OINTMENT TOPICAL 2 TIMES DAILY
Qty: 30 G | Refills: 1 | Status: SHIPPED | OUTPATIENT
Start: 2025-02-26

## 2025-02-26 NOTE — PATIENT INSTRUCTIONS
Dear Willard,    It is hard to determine the exact cause of your ear lesion. I have sent in some steroid ointment to try on your ear. I also want to make sure it is not skin cancer, so I have placed a referral to dermatology for further evaluation. If it completely resolves with the ointment, you can cancel the dermatology appointment. Our referral department should be contacting you soon to get this scheduled.     ANDREW Castro CNP

## 2025-03-16 RX ORDER — TAMSULOSIN HYDROCHLORIDE 0.4 MG/1
0.8 CAPSULE ORAL DAILY
Qty: 180 CAPSULE | Refills: 3 | OUTPATIENT
Start: 2025-03-16

## 2025-05-19 ENCOUNTER — MYC REFILL (OUTPATIENT)
Dept: FAMILY MEDICINE | Facility: CLINIC | Age: 60
End: 2025-05-19
Payer: COMMERCIAL

## 2025-05-19 DIAGNOSIS — K21.00 GASTROESOPHAGEAL REFLUX DISEASE WITH ESOPHAGITIS WITHOUT HEMORRHAGE: ICD-10-CM

## 2025-05-19 DIAGNOSIS — E11.9 TYPE 2 DIABETES MELLITUS WITHOUT COMPLICATION, WITHOUT LONG-TERM CURRENT USE OF INSULIN (H): ICD-10-CM

## 2025-05-19 RX ORDER — METFORMIN HYDROCHLORIDE 500 MG/1
1000 TABLET, EXTENDED RELEASE ORAL
Qty: 180 TABLET | Refills: 1 | Status: SHIPPED | OUTPATIENT
Start: 2025-05-19

## 2025-05-20 RX ORDER — OMEPRAZOLE 40 MG/1
CAPSULE, DELAYED RELEASE ORAL
Qty: 90 CAPSULE | Refills: 3 | OUTPATIENT
Start: 2025-05-20

## 2025-05-28 ENCOUNTER — MYC REFILL (OUTPATIENT)
Dept: FAMILY MEDICINE | Facility: CLINIC | Age: 60
End: 2025-05-28
Payer: COMMERCIAL

## 2025-05-28 DIAGNOSIS — E78.5 HYPERLIPIDEMIA LDL GOAL <70: ICD-10-CM

## 2025-05-29 RX ORDER — FENOFIBRATE 145 MG/1
145 TABLET, FILM COATED ORAL DAILY
Qty: 90 TABLET | Refills: 3 | OUTPATIENT
Start: 2025-05-29

## 2025-06-01 ASSESSMENT — SLEEP AND FATIGUE QUESTIONNAIRES
HOW LIKELY ARE YOU TO NOD OFF OR FALL ASLEEP WHILE LYING DOWN TO REST IN THE AFTERNOON WHEN CIRCUMSTANCES PERMIT: MODERATE CHANCE OF DOZING
HOW LIKELY ARE YOU TO NOD OFF OR FALL ASLEEP WHILE SITTING AND READING: SLIGHT CHANCE OF DOZING
HOW LIKELY ARE YOU TO NOD OFF OR FALL ASLEEP WHILE SITTING AND TALKING TO SOMEONE: WOULD NEVER DOZE
HOW LIKELY ARE YOU TO NOD OFF OR FALL ASLEEP IN A CAR, WHILE STOPPED FOR A FEW MINUTES IN TRAFFIC: WOULD NEVER DOZE
HOW LIKELY ARE YOU TO NOD OFF OR FALL ASLEEP WHILE WATCHING TV: SLIGHT CHANCE OF DOZING
HOW LIKELY ARE YOU TO NOD OFF OR FALL ASLEEP WHILE SITTING QUIETLY AFTER LUNCH WITHOUT ALCOHOL: SLIGHT CHANCE OF DOZING
HOW LIKELY ARE YOU TO NOD OFF OR FALL ASLEEP WHEN YOU ARE A PASSENGER IN A CAR FOR AN HOUR WITHOUT A BREAK: WOULD NEVER DOZE
HOW LIKELY ARE YOU TO NOD OFF OR FALL ASLEEP WHILE SITTING INACTIVE IN A PUBLIC PLACE: WOULD NEVER DOZE

## 2025-06-03 ENCOUNTER — DOCUMENTATION ONLY (OUTPATIENT)
Dept: SLEEP MEDICINE | Facility: CLINIC | Age: 60
End: 2025-06-03

## 2025-06-03 ENCOUNTER — OFFICE VISIT (OUTPATIENT)
Dept: SLEEP MEDICINE | Facility: CLINIC | Age: 60
End: 2025-06-03
Attending: INTERNAL MEDICINE
Payer: COMMERCIAL

## 2025-06-03 DIAGNOSIS — R29.818 SUSPECTED SLEEP APNEA: ICD-10-CM

## 2025-06-03 NOTE — PROGRESS NOTES
Pt is completing a home sleep test. Pt was instructed on how to put on the Noxturnal T3 device and associated equipment before going to bed and given the opportunity to practice putting it on before leaving the sleep center. Pt was reminded to bring the home sleep test kit back to the center tomorrow, at the scheduled time for download and reporting. Patient was instructed to complete study using the following treatment?  None  Neck circumference: 43 CM / 17 inches.  ESS: 5  Stop Ban  Device number: 45   Aleyda Sykes MA

## 2025-06-04 ENCOUNTER — DOCUMENTATION ONLY (OUTPATIENT)
Dept: SLEEP MEDICINE | Facility: CLINIC | Age: 60
End: 2025-06-04
Payer: COMMERCIAL

## 2025-06-05 NOTE — PROGRESS NOTES
HST POST-STUDY QUESTIONNAIRE    What time did you go to bed?  855pm  How long do you think it took to fall asleep?  20mins  What time did you wake up to start the day?  445am  Did you get up during the night at all?  no  If you woke up, do you remember approximately what time(s)?   Did you have any difficulty with the equipment?  No  Did you us any type of treatment with this study?  None  Was the head of the bed elevated? No  Did you sleep in a recliner?  No  Did you stop using CPAP at least 3 days before this test?  NA  Any other information you'd like us to know?

## 2025-06-05 NOTE — PROGRESS NOTES
Pt returned HST device. It was downloaded and forwarded data to the clinical specialist for scoring.   Aleyda Sykes MA

## 2025-06-10 ENCOUNTER — TRANSFERRED RECORDS (OUTPATIENT)
Dept: MULTI SPECIALTY CLINIC | Facility: CLINIC | Age: 60
End: 2025-06-10
Payer: COMMERCIAL

## 2025-06-10 LAB — RETINOPATHY: NORMAL

## 2025-06-17 ENCOUNTER — MYC MEDICAL ADVICE (OUTPATIENT)
Dept: SLEEP MEDICINE | Facility: CLINIC | Age: 60
End: 2025-06-17
Payer: COMMERCIAL

## 2025-06-18 NOTE — PROCEDURES
[facility]    Home Sleep Study Interpretation    Patient: Willard Manzano  MRN: [id number]  YOB: 1965  Study Date: 6/3/2025  PCP/Referring Provider:Cassandra Villafana PA-C   Ordering Provider: Ruth Georges MD    Indications for Home Study: Willard is a 60 year old patient with a history of type 2 diabetes mellitus without complication, without long-term current use of insulin (newly diagnosed), gastroesophageal reflux disease with esophagitis without hemorrhage, varicose veins both lower extremities, hyperlipidemia, chiari malformation type I and personal history of DVT who presents with symptoms suggestive of obstructive sleep apnea.    Weight: 208.1 lbs  BMI: 29.9    Vance:    STOP BAN/8     Data: A full night home sleep study was performed recording the standard physiologic parameters including body position, movement, sound, nasal pressure, thermal oral airflow, chest and abdominal movements with respiratory inductance plethysmography, and oxygen saturation by pulse oximetry. Pulse rate was estimated by oximetry recording. This study was considered adequate based on > 4 hours of quality oximetry and respiratory recording. As specified by the AASM Manual for the Scoring of Sleep and Associated events, version 2.3, Rule VIII.D 1B, 4% oxygen desaturation scoring for hypopneas is used as a standard of care on all home sleep apnea testing.    Analysis Time: 437.8 minutes    Respiration:  Sleep Associated Hypoxemia: Sustained hypoxemia was present.  Average oxygen saturation was 91%. Time with saturation less than 89% was 7.1 minutes. Time with saturation less than 90% was 57.4 minutes. The lowest oxygen saturation was 87.0%.  Snoring: Snoring was present 8% of the time with an average level of 66 dB. Duration of time snoring above 70 dB was 33.2 minutes.    Respiratory events: The home study revealed a presence of 12 obstructive apneas and 7 mixed and central apneas.  There were 22 hypopneas resulting in a combined apnea/hypopnea index [AHI] of 6 events per hour with  10 per hour supine, N/A prone, N/A upright, 2  per hour left side, and 2 per hour right side.    Position: Percent of time spent: Supine 43%, prone 0%, upright 0%, on left 30%, on right 28%.    Heart Rate: By pulse oximetry, the average pulse rate was 69 bpm. The maximum pulse rate was 100 bpm and the minimum pulse rate was 62 bpm.    Assessment:  Mild obstructive sleep apnea was present, predominant during supine sleep position with sleep associated hypoxemia.    Recommendations:  Consider A) positional therapy during sleep or B) mandibular advancement device through referral to sleep dentistry or C) treatment with auto titrating CPAP with pressure settings 5 to 15 cm water.  Suggest optimizing sleep hygiene and avoiding sleep deprivation  Weight management    Diagnosis Code(s): Obstructive Sleep Apnea G47.33, Hypoxemia G47.36, Snoring R06.83    Electronically signed by: Lynnette Georges MD June 17, 2025  Diplomate, American Board of Internal Medicine, Sleep Medicine

## 2025-06-30 ENCOUNTER — OFFICE VISIT (OUTPATIENT)
Dept: FAMILY MEDICINE | Facility: CLINIC | Age: 60
End: 2025-06-30
Payer: COMMERCIAL

## 2025-06-30 VITALS
SYSTOLIC BLOOD PRESSURE: 117 MMHG | DIASTOLIC BLOOD PRESSURE: 70 MMHG | RESPIRATION RATE: 12 BRPM | BODY MASS INDEX: 29.63 KG/M2 | TEMPERATURE: 98.6 F | WEIGHT: 207 LBS | OXYGEN SATURATION: 96 % | HEIGHT: 70 IN | HEART RATE: 86 BPM

## 2025-06-30 DIAGNOSIS — R39.11 BENIGN PROSTATIC HYPERPLASIA WITH URINARY HESITANCY: ICD-10-CM

## 2025-06-30 DIAGNOSIS — N40.1 BENIGN PROSTATIC HYPERPLASIA WITH URINARY HESITANCY: ICD-10-CM

## 2025-06-30 DIAGNOSIS — G47.33 MILD OBSTRUCTIVE SLEEP APNEA: ICD-10-CM

## 2025-06-30 DIAGNOSIS — E11.9 TYPE 2 DIABETES MELLITUS WITHOUT COMPLICATION, WITHOUT LONG-TERM CURRENT USE OF INSULIN (H): Primary | ICD-10-CM

## 2025-06-30 DIAGNOSIS — N28.9 FUNCTION KIDNEY DECREASED: ICD-10-CM

## 2025-06-30 LAB
ANION GAP SERPL CALCULATED.3IONS-SCNC: 11 MMOL/L (ref 7–15)
BUN SERPL-MCNC: 20.5 MG/DL (ref 8–23)
CALCIUM SERPL-MCNC: 9.5 MG/DL (ref 8.8–10.4)
CHLORIDE SERPL-SCNC: 106 MMOL/L (ref 98–107)
CREAT SERPL-MCNC: 1.27 MG/DL (ref 0.67–1.17)
CREAT UR-MCNC: 178 MG/DL
EGFRCR SERPLBLD CKD-EPI 2021: 64 ML/MIN/1.73M2
EST. AVERAGE GLUCOSE BLD GHB EST-MCNC: 131 MG/DL
GLUCOSE SERPL-MCNC: 125 MG/DL (ref 70–99)
HBA1C MFR BLD: 6.2 % (ref 0–5.6)
HCO3 SERPL-SCNC: 25 MMOL/L (ref 22–29)
MICROALBUMIN UR-MCNC: <12 MG/L
MICROALBUMIN/CREAT UR: NORMAL MG/G{CREAT}
POTASSIUM SERPL-SCNC: 4 MMOL/L (ref 3.4–5.3)
SODIUM SERPL-SCNC: 142 MMOL/L (ref 135–145)

## 2025-06-30 PROCEDURE — 3074F SYST BP LT 130 MM HG: CPT

## 2025-06-30 PROCEDURE — 1126F AMNT PAIN NOTED NONE PRSNT: CPT

## 2025-06-30 PROCEDURE — 80048 BASIC METABOLIC PNL TOTAL CA: CPT

## 2025-06-30 PROCEDURE — 83036 HEMOGLOBIN GLYCOSYLATED A1C: CPT

## 2025-06-30 PROCEDURE — 3078F DIAST BP <80 MM HG: CPT

## 2025-06-30 PROCEDURE — 99214 OFFICE O/P EST MOD 30 MIN: CPT

## 2025-06-30 PROCEDURE — 36415 COLL VENOUS BLD VENIPUNCTURE: CPT

## 2025-06-30 PROCEDURE — 3044F HG A1C LEVEL LT 7.0%: CPT

## 2025-06-30 PROCEDURE — 82570 ASSAY OF URINE CREATININE: CPT

## 2025-06-30 PROCEDURE — G2211 COMPLEX E/M VISIT ADD ON: HCPCS

## 2025-06-30 PROCEDURE — 82043 UR ALBUMIN QUANTITATIVE: CPT

## 2025-06-30 RX ORDER — TAMSULOSIN HYDROCHLORIDE 0.4 MG/1
0.8 CAPSULE ORAL DAILY
Qty: 180 CAPSULE | Refills: 3 | Status: SHIPPED | OUTPATIENT
Start: 2025-06-30

## 2025-06-30 ASSESSMENT — PAIN SCALES - GENERAL: PAINLEVEL_OUTOF10: NO PAIN (0)

## 2025-06-30 NOTE — PROGRESS NOTES
"  Assessment & Plan     (E11.9) Type 2 diabetes mellitus without complication, without long-term current use of insulin (H)  (primary encounter diagnosis)  Comment: Chronic medical condition due for monitoring. Patient continues to work on therapeutic lifestyle changes. A1c and microalbumin today. Metformin XR 1000 mg BID refilled.  Plan: Albumin Random Urine Quantitative with Creat         Ratio, Hemoglobin A1c, Basic metabolic panel          (Ca, Cl, CO2, Creat, Gluc, K, Na, BUN)    (G47.33) Mild obstructive sleep apnea  Comment: Diagnosed via recent sleep study. Patient is not currently interested in treating. Follow-up with sleep medicine.     (N40.1,  R39.11) Benign prostatic hyperplasia with urinary hesitancy  Comment: Patient has not been taking Flomax as it was somehow discontinued off is list. He continues to have LUTS and would like to restart the Flomax 0.8 mg daily; refilled.  Plan: tamsulosin (FLOMAX) 0.4 MG capsule      (N28.9) Kidney function decreased:  Comment: Will recheck kidney function today.      BMI  Estimated body mass index is 29.7 kg/m  as calculated from the following:    Height as of this encounter: 1.778 m (5' 10\").    Weight as of this encounter: 93.9 kg (207 lb).     The longitudinal plan of care for the diagnosis(es)/condition(s) as documented were addressed during this visit. Due to the added complexity in care, I will continue to support Willard in the subsequent management and with ongoing continuity of care.    Subjective   Willard is a 60 year old, presenting for the following health issues:  Recheck Medication (Taking medication as prescribed no side effects) and Results (Sleep study results review - still not getting enough sleep)        6/30/2025     1:48 PM   Additional Questions   Roomed by amarjit rush     Via the Health Maintenance questionnaire, the patient has reported the following services have been completed -Eye Exam: [facility] 2025-06-10, this information has " been sent to the abstraction team.  History of Present Illness       Reason for visit:  Follow pu from the last appointment    He eats 2-3 servings of fruits and vegetables daily.He consumes 1 sweetened beverage(s) daily.He exercises with enough effort to increase his heart rate 20 to 29 minutes per day.  He exercises with enough effort to increase his heart rate 4 days per week. He is missing 1 dose(s) of medications per week.  He is not taking prescribed medications regularly due to remembering to take.   Willard Manzano, 60 years old, reports feeling tired and having a tendency to nap easily, especially if he sits in a chair after returning home. He recently underwent a sleep study and mentions challenges with sleeping on his back during the study due to the equipment used. He prefers to sleep on his side, starting on the right and then switching to the left. He uses a body pillow to help with his sleep position and occasionally uses dental retainers.     Willard has been trying to maintain an exercise routine, having been consistent for a few months earlier in the year, but has since fallen off. He mentions using steps at home to increase his heart rate more effectively than a treadmill.    Willard has not consumed beer since Father's Day, when he had two beers and two shots of tequila. He generally does not drink much beer, occasionally having one with Mexican food. His diet includes a Mediterranean influence, with his wife preparing meals that include cottage cheese with fruit, hard-boiled eggs, and more chicken than other meats. He has lost some weight since December, going from 212 to 207 pounds.    He recalls taking tamsulosin, which was discontinued without a clear reason. He experiences frequent urination, needing to go when passing a bathroom and sometimes shortly after already going.     Diabetes Follow-up    How often are you checking your blood sugar? Not at all  What concerns do you have today about your  "diabetes? None   Do you have any of these symptoms? (Select all that apply)        BP Readings from Last 2 Encounters:   06/30/25 117/70   02/03/25 130/82     Hemoglobin A1C (%)   Date Value   06/30/2025 6.2 (H)   12/31/2024 6.6 (H)   12/07/2020 6.1 (H)   11/13/2019 6.1 (H)     LDL Cholesterol Calculated (mg/dL)   Date Value   12/31/2024 113 (H)   03/28/2024 98   12/07/2020 156 (H)   11/13/2019 144 (H)           How many servings of fruits and vegetables do you eat daily?  2-3  On average, how many sweetened beverages do you drink each day (Examples: soda, juice, sweet tea, etc.  Do NOT count diet or artificially sweetened beverages)?   1  How many days per week do you exercise enough to make your heart beat faster? 4  How many minutes a day do you exercise enough to make your heart beat faster? 20 - 29  How many days per week do you miss taking your medication? 1  What makes it hard for you to take your medications?  remembering to take        Objective    /70 (BP Location: Right arm, Patient Position: Sitting, Cuff Size: Adult Regular)   Pulse 86   Temp 98.6  F (37  C) (Oral)   Resp 12   Ht 1.778 m (5' 10\")   Wt 93.9 kg (207 lb)   SpO2 96%   BMI 29.70 kg/m    Body mass index is 29.7 kg/m .  Physical Exam   GENERAL: alert and no distress  RESP: lungs clear to auscultation - no rales, rhonchi or wheezes  CV: regular rate and rhythm, normal S1 S2, no S3 or S4, no murmur, click or rub, no peripheral edema          Signed Electronically by: ANDREW Castro CNP    "

## 2025-07-01 ENCOUNTER — RESULTS FOLLOW-UP (OUTPATIENT)
Dept: FAMILY MEDICINE | Facility: CLINIC | Age: 60
End: 2025-07-01

## 2025-08-17 ASSESSMENT — SLEEP AND FATIGUE QUESTIONNAIRES
HOW LIKELY ARE YOU TO NOD OFF OR FALL ASLEEP WHEN YOU ARE A PASSENGER IN A CAR FOR AN HOUR WITHOUT A BREAK: WOULD NEVER DOZE
HOW LIKELY ARE YOU TO NOD OFF OR FALL ASLEEP WHILE WATCHING TV: SLIGHT CHANCE OF DOZING
HOW LIKELY ARE YOU TO NOD OFF OR FALL ASLEEP WHILE LYING DOWN TO REST IN THE AFTERNOON WHEN CIRCUMSTANCES PERMIT: MODERATE CHANCE OF DOZING
HOW LIKELY ARE YOU TO NOD OFF OR FALL ASLEEP WHILE SITTING AND TALKING TO SOMEONE: WOULD NEVER DOZE
HOW LIKELY ARE YOU TO NOD OFF OR FALL ASLEEP WHILE SITTING QUIETLY AFTER LUNCH WITHOUT ALCOHOL: WOULD NEVER DOZE
HOW LIKELY ARE YOU TO NOD OFF OR FALL ASLEEP WHILE SITTING AND READING: SLIGHT CHANCE OF DOZING
HOW LIKELY ARE YOU TO NOD OFF OR FALL ASLEEP IN A CAR, WHILE STOPPED FOR A FEW MINUTES IN TRAFFIC: WOULD NEVER DOZE
HOW LIKELY ARE YOU TO NOD OFF OR FALL ASLEEP WHILE SITTING INACTIVE IN A PUBLIC PLACE: WOULD NEVER DOZE

## 2025-08-18 ENCOUNTER — OFFICE VISIT (OUTPATIENT)
Dept: SLEEP MEDICINE | Facility: CLINIC | Age: 60
End: 2025-08-18
Payer: COMMERCIAL

## 2025-08-18 VITALS
SYSTOLIC BLOOD PRESSURE: 114 MMHG | OXYGEN SATURATION: 97 % | BODY MASS INDEX: 29.92 KG/M2 | HEART RATE: 72 BPM | WEIGHT: 209 LBS | HEIGHT: 70 IN | DIASTOLIC BLOOD PRESSURE: 74 MMHG

## 2025-08-18 DIAGNOSIS — E66.3 OVERWEIGHT (BMI 25.0-29.9): ICD-10-CM

## 2025-08-18 DIAGNOSIS — G47.33 OSA (OBSTRUCTIVE SLEEP APNEA): Primary | ICD-10-CM

## 2025-08-18 PROCEDURE — 99212 OFFICE O/P EST SF 10 MIN: CPT | Performed by: INTERNAL MEDICINE

## 2025-08-18 PROCEDURE — 1126F AMNT PAIN NOTED NONE PRSNT: CPT | Performed by: INTERNAL MEDICINE

## 2025-08-18 PROCEDURE — 3074F SYST BP LT 130 MM HG: CPT | Performed by: INTERNAL MEDICINE

## 2025-08-18 PROCEDURE — 3078F DIAST BP <80 MM HG: CPT | Performed by: INTERNAL MEDICINE

## (undated) DEVICE — KIT ENDO TURNOVER/PROCEDURE W/CLEAN A SCOPE LINERS 103888

## (undated) RX ORDER — SIMETHICONE 40MG/0.6ML
SUSPENSION, DROPS(FINAL DOSAGE FORM)(ML) ORAL
Status: DISPENSED
Start: 2021-02-19

## (undated) RX ORDER — FENTANYL CITRATE 50 UG/ML
INJECTION, SOLUTION INTRAMUSCULAR; INTRAVENOUS
Status: DISPENSED
Start: 2021-02-19